# Patient Record
Sex: MALE | Race: WHITE | NOT HISPANIC OR LATINO | Employment: OTHER | URBAN - METROPOLITAN AREA
[De-identification: names, ages, dates, MRNs, and addresses within clinical notes are randomized per-mention and may not be internally consistent; named-entity substitution may affect disease eponyms.]

---

## 2017-01-04 ENCOUNTER — HOSPITAL ENCOUNTER (OUTPATIENT)
Facility: MEDICAL CENTER | Age: 72
End: 2017-01-04
Attending: SURGERY | Admitting: SURGERY
Payer: MEDICARE

## 2017-01-06 ENCOUNTER — APPOINTMENT (OUTPATIENT)
Dept: ADMISSIONS | Facility: MEDICAL CENTER | Age: 72
End: 2017-01-06
Attending: SURGERY
Payer: MEDICARE

## 2017-01-06 VITALS — HEIGHT: 69 IN

## 2017-01-06 RX ORDER — DILTIAZEM HYDROCHLORIDE 60 MG/1
60 TABLET, FILM COATED ORAL 2 TIMES DAILY
COMMUNITY
End: 2017-01-17 | Stop reason: HOSPADM

## 2017-01-06 RX ORDER — ATORVASTATIN CALCIUM 80 MG/1
80 TABLET, FILM COATED ORAL NIGHTLY
COMMUNITY
End: 2017-01-17 | Stop reason: HOSPADM

## 2017-01-06 RX ORDER — TIOTROPIUM BROMIDE 18 UG/1
18 CAPSULE ORAL; RESPIRATORY (INHALATION) DAILY
COMMUNITY
End: 2017-01-17 | Stop reason: HOSPADM

## 2017-01-06 RX ORDER — DIMENHYDRINATE 50 MG
TABLET ORAL DAILY
COMMUNITY
End: 2017-01-17 | Stop reason: HOSPADM

## 2017-01-06 NOTE — OR NURSING
Telephone preadmission done with patient who is coming in for surgery with Dr. Paulino on 1/18/17. Pt has history of Afib, and denies recent EKG. RN informed patient that Dr. Paulino's office would be contacted to arrange preop EKG for his upcoming surgery and to clarify Aspirin instructions.    RN contacted Dr. Jefferson Hensley's surgery scheduler. Shellie informed that due to patient's Afib and age, EKG would be needed for surgery per anesthesia guidelines and that patient would need Aspirin instructions. Shellie told this RN, she will contact patient today to arrange preop EKG and give patient Aspirin instructions.

## 2017-01-19 ENCOUNTER — TELEPHONE (OUTPATIENT)
Dept: CARDIOLOGY | Facility: MEDICAL CENTER | Age: 72
End: 2017-01-19

## 2017-01-19 NOTE — TELEPHONE ENCOUNTER
----- Message from Dot Arshad sent at 1/19/2017 12:09 PM PST -----  Regarding: Question about stopping medications  TT/Zenaida    Patient has an appt tomorrow, 1/20, with Dr Davila for surgical clearance. He needs to find out if he should stop taking his medications today and wants a call back at [phone].

## 2017-01-19 NOTE — TELEPHONE ENCOUNTER
Pt not available when called back.  Spoke with his spouse, Adelaida.  Advised Dr. Davila is seeing him for initial visit tomorrow, no medications available.  He will take his medications as he has been, bring them with him or a list and follow up with Dr. Davila as scheduled 1/20 @ 2:15pm

## 2017-01-20 ENCOUNTER — OFFICE VISIT (OUTPATIENT)
Dept: CARDIOLOGY | Facility: MEDICAL CENTER | Age: 72
End: 2017-01-20
Payer: MEDICARE

## 2017-01-20 VITALS
OXYGEN SATURATION: 94 % | SYSTOLIC BLOOD PRESSURE: 120 MMHG | HEART RATE: 96 BPM | HEIGHT: 69 IN | WEIGHT: 159.5 LBS | BODY MASS INDEX: 23.62 KG/M2 | DIASTOLIC BLOOD PRESSURE: 72 MMHG

## 2017-01-20 DIAGNOSIS — I71.40 ABDOMINAL AORTIC ANEURYSM (AAA) WITHOUT RUPTURE (HCC): ICD-10-CM

## 2017-01-20 DIAGNOSIS — E78.2 MIXED HYPERLIPIDEMIA: ICD-10-CM

## 2017-01-20 LAB — EKG IMPRESSION: NORMAL

## 2017-01-20 PROCEDURE — 99204 OFFICE O/P NEW MOD 45 MIN: CPT | Performed by: INTERNAL MEDICINE

## 2017-01-20 PROCEDURE — 93000 ELECTROCARDIOGRAM COMPLETE: CPT | Performed by: INTERNAL MEDICINE

## 2017-01-20 RX ORDER — DILTIAZEM HYDROCHLORIDE 60 MG/1
1 TABLET, FILM COATED ORAL 2 TIMES DAILY
Refills: 3 | Status: ON HOLD | COMMUNITY
Start: 2016-11-22 | End: 2017-09-05

## 2017-01-20 RX ORDER — TIOTROPIUM BROMIDE 18 UG/1
1 CAPSULE ORAL; RESPIRATORY (INHALATION) DAILY
Refills: 3 | COMMUNITY
Start: 2017-01-05 | End: 2018-07-06

## 2017-01-20 RX ORDER — ATORVASTATIN CALCIUM 80 MG/1
80 TABLET, FILM COATED ORAL DAILY
COMMUNITY
Start: 2016-12-15 | End: 2017-09-13 | Stop reason: SDUPTHER

## 2017-01-20 RX ORDER — UBIDECARENONE 100 MG
100 CAPSULE ORAL DAILY
Status: ON HOLD | COMMUNITY
Start: 2014-03-12 | End: 2017-09-05

## 2017-01-20 ASSESSMENT — ENCOUNTER SYMPTOMS
CLAUDICATION: 0
LOSS OF CONSCIOUSNESS: 0
DIZZINESS: 0
HEADACHES: 0
BLOOD IN STOOL: 0
EYE PAIN: 0
DEPRESSION: 0
HALLUCINATIONS: 0
CHILLS: 0
SPEECH CHANGE: 0
SHORTNESS OF BREATH: 0
PND: 0
SENSORY CHANGE: 0
MYALGIAS: 0
VOMITING: 0
ORTHOPNEA: 0
PALPITATIONS: 0
COUGH: 0
BRUISES/BLEEDS EASILY: 0
NAUSEA: 0
EYE DISCHARGE: 0
BLURRED VISION: 0
WEIGHT LOSS: 0
DOUBLE VISION: 0
ABDOMINAL PAIN: 0
FALLS: 0
FEVER: 0

## 2017-01-20 NOTE — MR AVS SNAPSHOT
"        Khai Munoz   2017 2:15 PM   Office Visit   MRN: [id number]    Department:  [facility]   Dept Phone:  [phone]    Description:  Male : 1945   Provider:  James Davila M.D.           Reason for Visit     New Patient Consult on potenital surgery for abdominal aortic anuerysm as per Dr. Paulino. Abnormal EKG, AFIB.       Allergies as of 2017     Allergen Noted Reactions    Declomycin 2017       Reaction a long time ago      You were diagnosed with     Abdominal aortic aneurysm (AAA) without rupture (CMS-[facility])   [3936782]       Mixed hyperlipidemia   [272.2.ICD-9-CM]         Vital Signs     Blood Pressure Pulse Height Weight Body Mass Index Oxygen Saturation    120/72 mmHg 96 1.753 m (5' 9.02\") 72.349 kg (159 lb 8 oz) 23.54 kg/m2 94%    Smoking Status                   Current Every Day Smoker           Basic Information     Date Of Birth Sex Race Ethnicity Preferred Language    1945 Male White Non- English      Health Maintenance        Date Due Completion Dates    IMM DTaP/Tdap/Td Vaccine (1 - Tdap) 1963 ---    COLONOSCOPY 1994 ---    IMM ZOSTER VACCINE 2004 ---    IMM PNEUMOCOCCAL 65+ (ADULT) LOW/MEDIUM RISK SERIES (1 of 2 - PCV13) 2009 ---    IMM INFLUENZA (1) 2016 ---            Results       Current Immunizations     No immunizations on file.      Below and/or attached are the medications your provider expects you to take. Review all of your home medications and newly ordered medications with your provider and/or pharmacist. Follow medication instructions as directed by your provider and/or pharmacist. Please keep your medication list with you and share with your provider. Update the information when medications are discontinued, doses are changed, or new medications (including over-the-counter products) are added; and carry medication information at all times in the event of emergency situations     Allergies:  DECLOMYCIN - " (reactions not documented)               Medications  Valid as of: January 20, 2017 -  2:29 PM    Generic Name Brand Name Tablet Size Instructions for use    Aspirin (Tab) aspirin 81 MG Take 81 mg by mouth every day.        Atorvastatin Calcium (Tab) LIPITOR 80 MG Take 80 mg by mouth every bedtime.        Calcium Citrate   Take 1 Tab by mouth 2 Times a Day. (With Vitamin D)        Coenzyme Q10 (Cap) Coenzyme Q10 100 MG Take 100 mg by mouth every day.        Cranberry   Take 2 Tabs by mouth every day.        DiltiaZEM HCl (Tab) CARDIZEM 60 MG Take 1 Tab by mouth 2 Times a Day.        Fluticasone-Salmeterol (AEROSOL POWDER, BREATH ACTIVATED) ADVAIR DISKUS 250-50 MCG/DOSE Take 1 Puff by mouth 2 Times a Day.        Pyridoxine HCl (Tab) pyridoxine 100 MG Take 100 mg by mouth every day.        Tiotropium Bromide Monohydrate (Cap) SPIRIVA HANDIHALER 18 MCG Inhale 1 Cap by mouth every day.        .                 Medicines prescribed today were sent to:     [id number] - [place], [place] - [address]    [address] [facility] [address]    Phone: [phone] Fax: [phone]    Open 24 Hours?: No      Medication refill instructions:       If your prescription bottle indicates you have medication refills left, it is not necessary to call your provider’s office. Please contact your pharmacy and they will refill your medication.    If your prescription bottle indicates you do not have any refills left, you may request refills at any time through one of the following ways: The online [employer] system (except Urgent Care), by calling your provider’s office, or by asking your pharmacy to contact your provider’s office with a refill request. Medication refills are processed only during regular business hours and may not be available until the next business day. Your provider may request additional information or to have a follow-up visit with you prior to refilling your medication.   *Please Note: Medication  refills are assigned a new Rx number when refilled electronically. Your pharmacy may indicate that no refills were authorized even though a new prescription for the same medication is available at the pharmacy. Please request the medicine by name with the pharmacy before contacting your provider for a refill.        Your To Do List     Future Labs/Procedures Complete By Expires    ECHOCARDIOGRAM COMP W/O CONT  As directed 1/21/2018         MyChart Status: Patient Declined

## 2017-01-20 NOTE — Clinical Note
[facility]   [address]  [place] [address]  Phone: [phone]  Fax: [phone]              Khai Munoz  1945    Encounter Date: 1/20/2017    James Davila M.D.          PROGRESS NOTE:  Subjective:   Khai Munoz is a 71 y.o. male who presents today for cardiac evaluation before his AAA surgery. Sounds to me that the approach is going to be endovascular. However there is a chance that he might need open repair. In terms of his cardiac history, he was told that he might have had atrial fibrillation in the past. His EKG today shows evidence of sinus rhythm. No prior cardiac workup or surgery in terms of invasive nature. He denies having any chest pain or shortness of breath. Able to walk up or climb up one flight of stairs without problems.    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HCC)    • Cancer (CMS-HCC) 2006     prostate   • High cholesterol    • Dental disorder      dental implants   • Emphysema of lung (CMS-HCC)    • Asthma      as a child   • Chronic cough    • Breath shortness    • Psychiatric problem      anxiety and depression     Past Surgical History   Procedure Laterality Date   • Other       brachy therapy and radiation for prostate ca   • Other       dental implants   • Cyst excision       scrotal     Family History   Problem Relation Age of Onset   • Heart Disease Mother      History   Smoking status   • Current Every Day Smoker -- 0.50 packs/day for 50 years   • Types: Cigarettes   Smokeless tobacco   • Never Used     Allergies   Allergen Reactions   • Declomycin      Reaction a long time ago     Outpatient Encounter Prescriptions as of 1/20/2017   Medication Sig Dispense Refill   • SPIRIVA HANDIHALER 18 MCG Cap Inhale 1 Cap by mouth every day.  3   • ADVAIR DISKUS 250-50 MCG/DOSE AEROSOL POWDER, BREATH  "ACTIVATED Take 1 Puff by mouth 2 Times a Day.  0   • atorvastatin (LIPITOR) 80 MG tablet Take 80 mg by mouth every bedtime.     • diltiazem (CARDIZEM) 60 MG Tab Take 1 Tab by mouth 2 Times a Day.  3   • Coenzyme Q10 (SM COENZYME Q-10) 100 MG Cap Take 100 mg by mouth every day.     • pyridoxine 100 MG tablet Take 100 mg by mouth every day.     • aspirin 81 MG tablet Take 81 mg by mouth every day.     • CRANBERRY CONCENTRATE PO Take 2 Tabs by mouth every day.     • CALCIUM CITRATE PO Take 1 Tab by mouth 2 Times a Day. (With Vitamin D)       No facility-administered encounter medications on file as of 1/20/2017.     Review of Systems   Constitutional: Negative for fever, chills, weight loss and malaise/fatigue.   HENT: Negative for ear discharge, ear pain, hearing loss and nosebleeds.    Eyes: Negative for blurred vision, double vision, pain and discharge.   Respiratory: Negative for cough and shortness of breath.    Cardiovascular: Negative for chest pain, palpitations, orthopnea, claudication, leg swelling and PND.   Gastrointestinal: Negative for nausea, vomiting, abdominal pain, blood in stool and melena.   Genitourinary: Negative for dysuria and hematuria.   Musculoskeletal: Negative for myalgias, joint pain and falls.   Skin: Negative for itching and rash.   Neurological: Negative for dizziness, sensory change, speech change, loss of consciousness and headaches.   Endo/Heme/Allergies: Negative for environmental allergies. Does not bruise/bleed easily.   Psychiatric/Behavioral: Negative for depression, suicidal ideas and hallucinations.        Objective:   /72 mmHg  Pulse 96  Ht 1.753 m (5' 9.02\")  Wt 72.349 kg (159 lb 8 oz)  BMI 23.54 kg/m2  SpO2 94%    Physical Exam   Constitutional: He is oriented to person, place, and time. No distress.   HENT:   Head: Normocephalic and atraumatic.   Eyes: EOM are normal.   Neck: Normal range of motion. No JVD present.   Cardiovascular: Normal rate, regular rhythm, " normal heart sounds and intact distal pulses.  Exam reveals no gallop and no friction rub.    No murmur heard.  Bilateral femoral pulses are 2+, bilateral dorsalis pedis pulses are 2+, bilateral posterior tibialis pulses are 2+.   Pulmonary/Chest: No respiratory distress. He has no wheezes. He has no rales. He exhibits no tenderness.   Abdominal: Soft. Bowel sounds are normal. There is no tenderness. There is no rebound and no guarding.   The is no presence of abdominal bruits   Musculoskeletal: Normal range of motion.   Neurological: He is alert and oriented to person, place, and time.   Skin: Skin is warm and dry.   Psychiatric: He has a normal mood and affect.   Nursing note and vitals reviewed.      Assessment:     1. Abdominal aortic aneurysm (AAA) without rupture (CMS-HCC)  ECHOCARDIOGRAM COMP W/O CONT    PET SCAN MYOCARDIAL PERFUSION    [facility] EKG (Clinic Performed)   2. Mixed hyperlipidemia  ECHOCARDIOGRAM COMP W/O CONT    PET SCAN MYOCARDIAL PERFUSION    [facility] EKG (Clinic Performed)       Medical Decision Making:  Today's Assessment / Status / Plan:     In anticipating for possible open repair of his AAA, I think we should pursue with a transthoracic echocardiogram along with myocardial PET scan stress test for risk stratification.    In terms of his history of atrial fibrillation, I do not see that it is a problem at this time. He is in sinus rhythm. And even if he is in atrial fibrillation during his surgery I don't think that would be a problem either. We can always treat it when it comes.    I will see patient back in clinic with lab tests and studies results in 3 months.    I thank you Dr. Fajardo for referring patient to our Cardiology Clinic today.        Baldo Fajardo M.D.  [address] [place] [address]  VIA Facsimile: [phone]

## 2017-01-20 NOTE — PROGRESS NOTES
Subjective:   Khai Munoz is a 71 y.o. male who presents today for cardiac evaluation before his AAA surgery. Sounds to me that the approach is going to be endovascular. However there is a chance that he might need open repair. In terms of his cardiac history, he was told that he might have had atrial fibrillation in the past. His EKG today shows evidence of sinus rhythm. No prior cardiac workup or surgery in terms of invasive nature. He denies having any chest pain or shortness of breath. Able to walk up or climb up one flight of stairs without problems.    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HCC)    • Cancer (CMS-HCC) 2006     prostate   • High cholesterol    • Dental disorder      dental implants   • Emphysema of lung (CMS-HCC)    • Asthma      as a child   • Chronic cough    • Breath shortness    • Psychiatric problem      anxiety and depression     Past Surgical History   Procedure Laterality Date   • Other       brachy therapy and radiation for prostate ca   • Other       dental implants   • Cyst excision       scrotal     Family History   Problem Relation Age of Onset   • Heart Disease Mother      History   Smoking status   • Current Every Day Smoker -- 0.50 packs/day for 50 years   • Types: Cigarettes   Smokeless tobacco   • Never Used     Allergies   Allergen Reactions   • Declomycin      Reaction a long time ago     Outpatient Encounter Prescriptions as of 1/20/2017   Medication Sig Dispense Refill   • SPIRIVA HANDIHALER 18 MCG Cap Inhale 1 Cap by mouth every day.  3   • ADVAIR DISKUS 250-50 MCG/DOSE AEROSOL POWDER, BREATH ACTIVATED Take 1 Puff by mouth 2 Times a Day.  0   • atorvastatin (LIPITOR) 80 MG tablet Take 80 mg by mouth every bedtime.     • diltiazem (CARDIZEM) 60 MG Tab Take 1 Tab by mouth 2 Times a Day.  3   • Coenzyme Q10 (SM COENZYME Q-10) 100 MG Cap Take 100 mg by mouth every day.   "   • pyridoxine 100 MG tablet Take 100 mg by mouth every day.     • aspirin 81 MG tablet Take 81 mg by mouth every day.     • CRANBERRY CONCENTRATE PO Take 2 Tabs by mouth every day.     • CALCIUM CITRATE PO Take 1 Tab by mouth 2 Times a Day. (With Vitamin D)       No facility-administered encounter medications on file as of 1/20/2017.     Review of Systems   Constitutional: Negative for fever, chills, weight loss and malaise/fatigue.   HENT: Negative for ear discharge, ear pain, hearing loss and nosebleeds.    Eyes: Negative for blurred vision, double vision, pain and discharge.   Respiratory: Negative for cough and shortness of breath.    Cardiovascular: Negative for chest pain, palpitations, orthopnea, claudication, leg swelling and PND.   Gastrointestinal: Negative for nausea, vomiting, abdominal pain, blood in stool and melena.   Genitourinary: Negative for dysuria and hematuria.   Musculoskeletal: Negative for myalgias, joint pain and falls.   Skin: Negative for itching and rash.   Neurological: Negative for dizziness, sensory change, speech change, loss of consciousness and headaches.   Endo/Heme/Allergies: Negative for environmental allergies. Does not bruise/bleed easily.   Psychiatric/Behavioral: Negative for depression, suicidal ideas and hallucinations.        Objective:   /72 mmHg  Pulse 96  Ht 1.753 m (5' 9.02\")  Wt 72.349 kg (159 lb 8 oz)  BMI 23.54 kg/m2  SpO2 94%    Physical Exam   Constitutional: He is oriented to person, place, and time. No distress.   HENT:   Head: Normocephalic and atraumatic.   Eyes: EOM are normal.   Neck: Normal range of motion. No JVD present.   Cardiovascular: Normal rate, regular rhythm, normal heart sounds and intact distal pulses.  Exam reveals no gallop and no friction rub.    No murmur heard.  Bilateral femoral pulses are 2+, bilateral dorsalis pedis pulses are 2+, bilateral posterior tibialis pulses are 2+.   Pulmonary/Chest: No respiratory distress. He has " no wheezes. He has no rales. He exhibits no tenderness.   Abdominal: Soft. Bowel sounds are normal. There is no tenderness. There is no rebound and no guarding.   The is no presence of abdominal bruits   Musculoskeletal: Normal range of motion.   Neurological: He is alert and oriented to person, place, and time.   Skin: Skin is warm and dry.   Psychiatric: He has a normal mood and affect.   Nursing note and vitals reviewed.      Assessment:     1. Abdominal aortic aneurysm (AAA) without rupture (CMS-HCC)  ECHOCARDIOGRAM COMP W/O CONT    PET SCAN MYOCARDIAL PERFUSION    [facility] EKG (Clinic Performed)   2. Mixed hyperlipidemia  ECHOCARDIOGRAM COMP W/O CONT    PET SCAN MYOCARDIAL PERFUSION    [facility] (Clinic Performed)       Medical Decision Making:  Today's Assessment / Status / Plan:     In anticipating for possible open repair of his AAA, I think we should pursue with a transthoracic echocardiogram along with myocardial PET scan stress test for risk stratification.    In terms of his history of atrial fibrillation, I do not see that it is a problem at this time. He is in sinus rhythm. And even if he is in atrial fibrillation during his surgery I don't think that would be a problem either. We can always treat it when it comes.    I will see patient back in clinic with lab tests and studies results in 3 months.    I thank you Dr. Fajardo for referring patient to our Cardiology Clinic today.

## 2017-01-23 ENCOUNTER — HOSPITAL ENCOUNTER (OUTPATIENT)
Dept: CARDIOLOGY | Facility: MEDICAL CENTER | Age: 72
End: 2017-01-23
Attending: INTERNAL MEDICINE | Admitting: INTERNAL MEDICINE
Payer: MEDICARE

## 2017-01-23 ENCOUNTER — HOSPITAL ENCOUNTER (OUTPATIENT)
Dept: CARDIOLOGY | Facility: MEDICAL CENTER | Age: 72
End: 2017-01-23
Attending: INTERNAL MEDICINE
Payer: MEDICARE

## 2017-01-23 DIAGNOSIS — I71.40 ABDOMINAL AORTIC ANEURYSM (AAA) WITHOUT RUPTURE (HCC): ICD-10-CM

## 2017-01-23 DIAGNOSIS — E78.2 MIXED HYPERLIPIDEMIA: ICD-10-CM

## 2017-01-23 PROCEDURE — 93017 CV STRESS TEST TRACING ONLY: CPT

## 2017-01-23 PROCEDURE — 78492 MYOCRD IMG PET MLT RST&STRS: CPT

## 2017-01-23 PROCEDURE — 700111 HCHG RX REV CODE 636 W/ 250 OVERRIDE (IP)

## 2017-01-23 PROCEDURE — 93306 TTE W/DOPPLER COMPLETE: CPT | Mod: 26 | Performed by: INTERNAL MEDICINE

## 2017-01-23 PROCEDURE — A9555 RB82 RUBIDIUM: HCPCS

## 2017-01-24 ENCOUNTER — TELEPHONE (OUTPATIENT)
Dept: CARDIOLOGY | Facility: MEDICAL CENTER | Age: 72
End: 2017-01-24

## 2017-01-24 LAB
LV EJECT FRACT  99904: 60
LV EJECT FRACT MOD 2C 99903: 68.46
LV EJECT FRACT MOD 4C 99902: 68.26
LV EJECT FRACT MOD BP 99901: 69.15

## 2017-01-24 NOTE — PROCEDURES
REFERRING PHYSICIAN:  Antonio Davila MD    AGE:  71   GENDER:  Male   HEIGHT:  69 inches   WEIGHT:  159 pounds   BMI:     INDICATIONS:  Shortness of breath.    MEDICATIONS:      PROCEDURE:  The patient reviewed and signed the acknowledgement for testing   form.  The patient was in a fasting state and was properly prepared for   testing.  An intravenous line was inserted and a flush of normal saline   followed to insure line patency.    A transmission scan was acquired for attenuation correction using the internal   Germanium sources.  The patient was then administered 20.0 mCi of   Rubidium-82.  Approximately 90 seconds after the infusion, resting imagine   were obtained with ECG-gating.  Following the resting series, the patient   administered 41 mg of dipyridamole over four minutes.  The blood pressure,   heart rate and ECG were monitored and recorded.  After the dipyridamole   infusion was completed, another transmission scan for attenuation correction   was obtained.  The patient was then administered 20.0 mCi of Rubidium-82.    Approximately 90 seconds after the infusion, Peak stress images were obtained   with ECG-gating.    CLINICAL RESPONSE:  Resting blood pressure was 121/67 with a heart rate of   96.  Immediately post-dipyridamole infusion the blood pressure was 110/53 with   a heart rate of 89.  After a recovery period the blood pressure was 115/71   with a heart rate of 88.    The patient experienced no symptoms during testing.    Aminophylline was not administered following the scan.    ELECTROCARDIOGRAPHIC FINDINGS:  The patient has a normal baseline   electrocardiogram.  There are no significant changes or arrhythmias   appreciated during pharmacologic stress.    SCINTOGRAPHIC FINDINGS:  There is normal perfusion of the left ventricle, both   globally and segmentally after pharmacologic stress.  No dilation of left   ventricle was appreciated.    GATED WALL MOTION FINDINGS:  The left ventricle appears  normal segmentally and   globally.  Calculated resting ejection fraction of 58% and corrects to 69%   with stress.    CONCLUSIONS AND IMPRESSIONS:  1.  Normal cardiac PET scan without evidence for jeopardized viable myocardium   or prior infarction.  2.  Resting ejection fraction 58%.       ____________________________________     MD RICKY MICHAELS / SUKHJINDER    DD:  01/23/2017 17:03:04  DT:  01/23/2017 17:26:36    D#:  [id number]  Job#:  [id number]

## 2017-01-24 NOTE — TELEPHONE ENCOUNTER
Pt notified his PET from 1/20/17 is WNL and he is ok to proceed with planned surgery with Dr. Paulino with [facility] for AAA repair.  Clearance letter faxed to [phone]  Tel: [phone]  Moderate cardiac risk.  PET SCAN and office notes from 1/20/17 also faxed

## 2017-01-24 NOTE — TELEPHONE ENCOUNTER
----- Message from James Davila M.D. sent at 1/24/2017  8:18 AM PST -----  Dear ed    He should be able to proceed with his surgery.

## 2017-02-09 ENCOUNTER — APPOINTMENT (OUTPATIENT)
Dept: ADMISSIONS | Facility: MEDICAL CENTER | Age: 72
DRG: 269 | End: 2017-02-09
Attending: SURGERY
Payer: MEDICARE

## 2017-02-15 ENCOUNTER — HOSPITAL ENCOUNTER (INPATIENT)
Facility: MEDICAL CENTER | Age: 72
LOS: 4 days | DRG: 269 | End: 2017-02-19
Attending: SURGERY | Admitting: SURGERY
Payer: MEDICARE

## 2017-02-15 ENCOUNTER — APPOINTMENT (OUTPATIENT)
Dept: RADIOLOGY | Facility: MEDICAL CENTER | Age: 72
DRG: 269 | End: 2017-02-15
Attending: SURGERY
Payer: MEDICARE

## 2017-02-15 PROBLEM — I71.40 ABDOMINAL ANEURYSM (HCC): Status: ACTIVE | Noted: 2017-02-15

## 2017-02-15 LAB
ANION GAP SERPL CALC-SCNC: 11 MMOL/L (ref 0–11.9)
BASOPHILS # BLD AUTO: 0.5 % (ref 0–1.8)
BASOPHILS # BLD: 0.04 K/UL (ref 0–0.12)
BUN SERPL-MCNC: 12 MG/DL (ref 8–22)
CALCIUM SERPL-MCNC: 9.3 MG/DL (ref 8.5–10.5)
CHLORIDE SERPL-SCNC: 107 MMOL/L (ref 96–112)
CO2 SERPL-SCNC: 19 MMOL/L (ref 20–33)
CREAT SERPL-MCNC: 1.21 MG/DL (ref 0.5–1.4)
EOSINOPHIL # BLD AUTO: 0.54 K/UL (ref 0–0.51)
EOSINOPHIL NFR BLD: 6.2 % (ref 0–6.9)
ERYTHROCYTE [DISTWIDTH] IN BLOOD BY AUTOMATED COUNT: 49.9 FL (ref 35.9–50)
GFR SERPL CREATININE-BSD FRML MDRD: 59 ML/MIN/1.73 M 2
GLUCOSE SERPL-MCNC: 103 MG/DL (ref 65–99)
HCT VFR BLD AUTO: 41.7 % (ref 42–52)
HGB BLD-MCNC: 14.3 G/DL (ref 14–18)
IMM GRANULOCYTES # BLD AUTO: 0.03 K/UL (ref 0–0.11)
IMM GRANULOCYTES NFR BLD AUTO: 0.3 % (ref 0–0.9)
LYMPHOCYTES # BLD AUTO: 1.57 K/UL (ref 1–4.8)
LYMPHOCYTES NFR BLD: 17.9 % (ref 22–41)
MCH RBC QN AUTO: 33.4 PG (ref 27–33)
MCHC RBC AUTO-ENTMCNC: 34.3 G/DL (ref 33.7–35.3)
MCV RBC AUTO: 97.4 FL (ref 81.4–97.8)
MONOCYTES # BLD AUTO: 0.62 K/UL (ref 0–0.85)
MONOCYTES NFR BLD AUTO: 7.1 % (ref 0–13.4)
NEUTROPHILS # BLD AUTO: 5.95 K/UL (ref 1.82–7.42)
NEUTROPHILS NFR BLD: 68 % (ref 44–72)
NRBC # BLD AUTO: 0 K/UL
NRBC BLD AUTO-RTO: 0 /100 WBC
PLATELET # BLD AUTO: 204 K/UL (ref 164–446)
PMV BLD AUTO: 10 FL (ref 9–12.9)
POTASSIUM SERPL-SCNC: 4.1 MMOL/L (ref 3.6–5.5)
RBC # BLD AUTO: 4.28 M/UL (ref 4.7–6.1)
SODIUM SERPL-SCNC: 137 MMOL/L (ref 135–145)
WBC # BLD AUTO: 8.8 K/UL (ref 4.8–10.8)

## 2017-02-15 PROCEDURE — 500258 HCHG CATH, SIZING OMNI 5F 70CM: Performed by: SURGERY

## 2017-02-15 PROCEDURE — 500700 HCHG HEMOCLIP, SMALL (RED): Performed by: SURGERY

## 2017-02-15 PROCEDURE — 501837 HCHG SUTURE CV: Performed by: SURGERY

## 2017-02-15 PROCEDURE — 160041 HCHG SURGERY MINUTES - EA ADDL 1 MIN LEVEL 4: Performed by: SURGERY

## 2017-02-15 PROCEDURE — 80048 BASIC METABOLIC PNL TOTAL CA: CPT

## 2017-02-15 PROCEDURE — A4606 OXYGEN PROBE USED W OXIMETER: HCPCS | Performed by: SURGERY

## 2017-02-15 PROCEDURE — 500887 HCHG PACK, MAJOR BASIN: Performed by: SURGERY

## 2017-02-15 PROCEDURE — 700101 HCHG RX REV CODE 250

## 2017-02-15 PROCEDURE — 500043 HCHG BAG-A-JET: Performed by: SURGERY

## 2017-02-15 PROCEDURE — 110371 HCHG SHELL REV 272: Performed by: SURGERY

## 2017-02-15 PROCEDURE — C1769 GUIDE WIRE: HCPCS | Performed by: SURGERY

## 2017-02-15 PROCEDURE — 85025 COMPLETE CBC W/AUTO DIFF WBC: CPT

## 2017-02-15 PROCEDURE — 502000 HCHG MISC OR IMPLANTS RC 0278: Performed by: SURGERY

## 2017-02-15 PROCEDURE — 500698 HCHG HEMOCLIP, MEDIUM: Performed by: SURGERY

## 2017-02-15 PROCEDURE — 770006 HCHG ROOM/CARE - MED/SURG/GYN SEMI*

## 2017-02-15 PROCEDURE — A9270 NON-COVERED ITEM OR SERVICE: HCPCS

## 2017-02-15 PROCEDURE — 160009 HCHG ANES TIME/MIN: Performed by: SURGERY

## 2017-02-15 PROCEDURE — 500869 HCHG NEEDLE, THINWALL 19GA (COOK): Performed by: SURGERY

## 2017-02-15 PROCEDURE — 700102 HCHG RX REV CODE 250 W/ 637 OVERRIDE(OP): Performed by: NURSE PRACTITIONER

## 2017-02-15 PROCEDURE — 501499 HCHG SURG-I-LOOP, MINI (BLUE): Performed by: SURGERY

## 2017-02-15 PROCEDURE — 500896 HCHG PACK, VASCULAR (FOR ROOM 10): Performed by: SURGERY

## 2017-02-15 PROCEDURE — 501445 HCHG STAPLER, SKIN DISP: Performed by: SURGERY

## 2017-02-15 PROCEDURE — A9270 NON-COVERED ITEM OR SERVICE: HCPCS | Performed by: NURSE PRACTITIONER

## 2017-02-15 PROCEDURE — 700111 HCHG RX REV CODE 636 W/ 250 OVERRIDE (IP)

## 2017-02-15 PROCEDURE — 160048 HCHG OR STATISTICAL LEVEL 1-5: Performed by: SURGERY

## 2017-02-15 PROCEDURE — C1768 GRAFT, VASCULAR: HCPCS | Performed by: SURGERY

## 2017-02-15 PROCEDURE — 501498 HCHG SURG-I-LOOP, MAXI (BLUE): Performed by: SURGERY

## 2017-02-15 PROCEDURE — C1894 INTRO/SHEATH, NON-LASER: HCPCS | Performed by: SURGERY

## 2017-02-15 PROCEDURE — B4101ZZ FLUOROSCOPY OF ABDOMINAL AORTA USING LOW OSMOLAR CONTRAST: ICD-10-PCS | Performed by: SURGERY

## 2017-02-15 PROCEDURE — 160002 HCHG RECOVERY MINUTES (STAT): Performed by: SURGERY

## 2017-02-15 PROCEDURE — 700102 HCHG RX REV CODE 250 W/ 637 OVERRIDE(OP)

## 2017-02-15 PROCEDURE — A6402 STERILE GAUZE <= 16 SQ IN: HCPCS | Performed by: SURGERY

## 2017-02-15 PROCEDURE — 110382 HCHG SHELL REV 271: Performed by: SURGERY

## 2017-02-15 PROCEDURE — 502240 HCHG MISC OR SUPPLY RC 0272: Performed by: SURGERY

## 2017-02-15 PROCEDURE — 160036 HCHG PACU - EA ADDL 30 MINS PHASE I: Performed by: SURGERY

## 2017-02-15 PROCEDURE — 04V03DZ RESTRICTION OF ABDOMINAL AORTA WITH INTRALUMINAL DEVICE, PERCUTANEOUS APPROACH: ICD-10-PCS | Performed by: SURGERY

## 2017-02-15 PROCEDURE — 501838 HCHG SUTURE GENERAL: Performed by: SURGERY

## 2017-02-15 PROCEDURE — 160035 HCHG PACU - 1ST 60 MINS PHASE I: Performed by: SURGERY

## 2017-02-15 PROCEDURE — 160029 HCHG SURGERY MINUTES - 1ST 30 MINS LEVEL 4: Performed by: SURGERY

## 2017-02-15 DEVICE — IMPLANTABLE DEVICE: Type: IMPLANTABLE DEVICE | Status: FUNCTIONAL

## 2017-02-15 DEVICE — ENDOPROSTHESIS CONTRALATERAL LEG 14.5MM X 14CM: Type: IMPLANTABLE DEVICE | Status: FUNCTIONAL

## 2017-02-15 RX ORDER — ACETAMINOPHEN 500 MG
1000 TABLET ORAL EVERY 6 HOURS
Status: DISCONTINUED | OUTPATIENT
Start: 2017-02-15 | End: 2017-02-19 | Stop reason: HOSPADM

## 2017-02-15 RX ORDER — PYRIDOXINE HCL (VITAMIN B6) 50 MG
100 TABLET ORAL DAILY
Status: DISCONTINUED | OUTPATIENT
Start: 2017-02-16 | End: 2017-02-19 | Stop reason: HOSPADM

## 2017-02-15 RX ORDER — DEXTROSE MONOHYDRATE, SODIUM CHLORIDE, AND POTASSIUM CHLORIDE 50; 1.49; 4.5 G/1000ML; G/1000ML; G/1000ML
INJECTION, SOLUTION INTRAVENOUS CONTINUOUS
Status: DISCONTINUED | OUTPATIENT
Start: 2017-02-15 | End: 2017-02-19 | Stop reason: HOSPADM

## 2017-02-15 RX ORDER — MAGNESIUM HYDROXIDE 1200 MG/15ML
LIQUID ORAL
Status: DISCONTINUED | OUTPATIENT
Start: 2017-02-15 | End: 2017-02-15 | Stop reason: HOSPADM

## 2017-02-15 RX ORDER — DILTIAZEM HYDROCHLORIDE 60 MG/1
60 TABLET, FILM COATED ORAL 2 TIMES DAILY
Status: DISCONTINUED | OUTPATIENT
Start: 2017-02-15 | End: 2017-02-19 | Stop reason: HOSPADM

## 2017-02-15 RX ORDER — MORPHINE SULFATE 4 MG/ML
1-4 INJECTION, SOLUTION INTRAMUSCULAR; INTRAVENOUS
Status: DISCONTINUED | OUTPATIENT
Start: 2017-02-15 | End: 2017-02-19 | Stop reason: HOSPADM

## 2017-02-15 RX ORDER — OXYCODONE HCL 5 MG/5 ML
SOLUTION, ORAL ORAL
Status: COMPLETED
Start: 2017-02-15 | End: 2017-02-15

## 2017-02-15 RX ORDER — MIDAZOLAM HYDROCHLORIDE 1 MG/ML
INJECTION INTRAMUSCULAR; INTRAVENOUS
Status: COMPLETED
Start: 2017-02-15 | End: 2017-02-15

## 2017-02-15 RX ORDER — ONDANSETRON 2 MG/ML
4 INJECTION INTRAMUSCULAR; INTRAVENOUS EVERY 4 HOURS PRN
Status: DISCONTINUED | OUTPATIENT
Start: 2017-02-15 | End: 2017-02-19 | Stop reason: HOSPADM

## 2017-02-15 RX ORDER — BUDESONIDE AND FORMOTEROL FUMARATE DIHYDRATE 160; 4.5 UG/1; UG/1
2 AEROSOL RESPIRATORY (INHALATION)
Status: DISCONTINUED | OUTPATIENT
Start: 2017-02-15 | End: 2017-02-16

## 2017-02-15 RX ORDER — ATORVASTATIN CALCIUM 40 MG/1
80 TABLET, FILM COATED ORAL DAILY
Status: DISCONTINUED | OUTPATIENT
Start: 2017-02-16 | End: 2017-02-19 | Stop reason: HOSPADM

## 2017-02-15 RX ORDER — SODIUM CHLORIDE, SODIUM LACTATE, POTASSIUM CHLORIDE, CALCIUM CHLORIDE 600; 310; 30; 20 MG/100ML; MG/100ML; MG/100ML; MG/100ML
1000 INJECTION, SOLUTION INTRAVENOUS
Status: COMPLETED | OUTPATIENT
Start: 2017-02-15 | End: 2017-02-15

## 2017-02-15 RX ORDER — TIOTROPIUM BROMIDE 18 UG/1
1 CAPSULE ORAL; RESPIRATORY (INHALATION) DAILY
Status: DISCONTINUED | OUTPATIENT
Start: 2017-02-16 | End: 2017-02-19 | Stop reason: HOSPADM

## 2017-02-15 RX ORDER — DEXTROSE MONOHYDRATE, SODIUM CHLORIDE, AND POTASSIUM CHLORIDE 50; 1.49; 4.5 G/1000ML; G/1000ML; G/1000ML
INJECTION, SOLUTION INTRAVENOUS
Status: COMPLETED
Start: 2017-02-15 | End: 2017-02-15

## 2017-02-15 RX ORDER — OXYCODONE HYDROCHLORIDE 5 MG/1
5 TABLET ORAL
Status: DISCONTINUED | OUTPATIENT
Start: 2017-02-15 | End: 2017-02-19 | Stop reason: HOSPADM

## 2017-02-15 RX ORDER — HYDROCODONE BITARTRATE AND ACETAMINOPHEN 5; 325 MG/1; MG/1
1-2 TABLET ORAL EVERY 4 HOURS PRN
Qty: 30 TAB | Refills: 0 | Status: SHIPPED | OUTPATIENT
Start: 2017-02-15 | End: 2017-08-31

## 2017-02-15 RX ADMIN — POTASSIUM CHLORIDE, DEXTROSE MONOHYDRATE AND SODIUM CHLORIDE: 150; 5; 450 INJECTION, SOLUTION INTRAVENOUS at 17:45

## 2017-02-15 RX ADMIN — SODIUM CHLORIDE, SODIUM LACTATE, POTASSIUM CHLORIDE, CALCIUM CHLORIDE 1000 ML: 600; 310; 30; 20 INJECTION, SOLUTION INTRAVENOUS at 12:36

## 2017-02-15 RX ADMIN — MIDAZOLAM 0.5 MG: 1 INJECTION INTRAMUSCULAR; INTRAVENOUS at 17:44

## 2017-02-15 RX ADMIN — FENTANYL CITRATE 25 MCG: 50 INJECTION, SOLUTION INTRAMUSCULAR; INTRAVENOUS at 17:22

## 2017-02-15 RX ADMIN — OXYCODONE HYDROCHLORIDE 10 MG: 5 SOLUTION ORAL at 17:15

## 2017-02-15 RX ADMIN — FENTANYL CITRATE 25 MCG: 50 INJECTION, SOLUTION INTRAMUSCULAR; INTRAVENOUS at 17:13

## 2017-02-15 ASSESSMENT — LIFESTYLE VARIABLES
ALCOHOL_USE: YES
EVER FELT BAD OR GUILTY ABOUT YOUR DRINKING: NO
TOTAL SCORE: 1
HAVE YOU EVER FELT YOU SHOULD CUT DOWN ON YOUR DRINKING: YES
TOTAL SCORE: 1
AVERAGE NUMBER OF DAYS PER WEEK YOU HAVE A DRINK CONTAINING ALCOHOL: 7
EVER_SMOKED: YES
HAVE PEOPLE ANNOYED YOU BY CRITICIZING YOUR DRINKING: NO
EVER HAD A DRINK FIRST THING IN THE MORNING TO STEADY YOUR NERVES TO GET RID OF A HANGOVER: NO
HOW MANY TIMES IN THE PAST YEAR HAVE YOU HAD 5 OR MORE DRINKS IN A DAY: 0
TOTAL SCORE: 1
CONSUMPTION TOTAL: NEGATIVE
ON A TYPICAL DAY WHEN YOU DRINK ALCOHOL HOW MANY DRINKS DO YOU HAVE: 1

## 2017-02-15 ASSESSMENT — PAIN SCALES - GENERAL
PAINLEVEL_OUTOF10: 3
PAINLEVEL_OUTOF10: 3
PAINLEVEL_OUTOF10: 0
PAINLEVEL_OUTOF10: 4
PAINLEVEL_OUTOF10: 3
PAINLEVEL_OUTOF10: 5
PAINLEVEL_OUTOF10: 3
PAINLEVEL_OUTOF10: 2
PAINLEVEL_OUTOF10: 2

## 2017-02-15 NOTE — IP AVS SNAPSHOT
2/19/2017          Khai Munoz  [address]  [address] [address]    Dear Khai:    [facility] wants to ensure your discharge home is safe and you or your loved ones have had all your questions answered regarding your care after you leave the hospital.    You may receive a telephone call within two days of your discharge.  This call is to make certain you understand your discharge instructions as well as ensure we provided you with the best care possible during your stay with us.     The call will only last approximately 3-5 minutes and will be done by a nurse.    Once again, we want to ensure your discharge home is safe and that you have a clear understanding of any next steps in your care.  If you have any questions or concerns, please do not hesitate to contact us, we are here for you.  Thank you for choosing [facility] for your healthcare needs.    Sincerely,    Aleksey Moyer    [facility]

## 2017-02-15 NOTE — IP AVS SNAPSHOT
" <p align=\"LEFT\"><IMG SRC=\"//EMRWB/blob$/Images/[email or web address]\" alt=\"Image\" WIDTH=\"50%\" HEIGHT=\"200\" BORDER=\"\"></p>                   Name:Khai Munoz  Medical Record Number:[id number]  CSN: [id number]    YOB: 1945   Age: 71 y.o.  Sex: male  HT:1.753 m (5' 9\") WT: 69 kg (152 lb 1.9 oz)          Admit Date: 2/15/2017     Discharge Date:   Today's Date: 2/19/2017  Attending Doctor:  Calvin Paulino M.D.                  Allergies:  Declomycin          Your appointments     Apr 12, 2017  2:30 PM   FOLLOW UP with James Davila M.D.   [facility] (--)    [address], [address]  [place] [id number] [phone]              Follow-up Information     1. Follow up with Calvin Paulino M.D. In 2 weeks.    Specialties:  Surgery, Radiology    Contact information    [address]  [place] [id number] [phone]           Medication List      Take these Medications        Instructions    ADVAIR DISKUS 250-50 MCG/DOSE Aepb   Generic drug:  fluticasone-salmeterol    Take 1 Puff by mouth 2 Times a Day.   Dose:  1 Puff       aspirin 81 MG tablet    Take 81 mg by mouth every day.   Dose:  81 mg       atorvastatin 80 MG tablet   Commonly known as:  LIPITOR    Take 80 mg by mouth every day.   Dose:  80 mg       CALCIUM CITRATE PO    Take 1 Tab by mouth 2 Times a Day. (With Vitamin D)   Dose:  1 Tab       CRANBERRY CONCENTRATE PO    Take 2 Tabs by mouth every day.   Dose:  2 Tab       diltiazem 60 MG Tabs   Commonly known as:  CARDIZEM    Take 1 Tab by mouth 2 Times a Day.   Dose:  1 Tab       hydrocodone-acetaminophen 5-325 MG Tabs per tablet   Commonly known as:  NORCO    Take 1-2 Tabs by mouth every four hours as needed.   Dose:  1-2 Tab       pyridoxine 100 MG tablet    Take 100 mg by mouth every day. Vitamin B-6   Dose:  100 mg       SM COENZYME Q-10 100 MG Caps   Generic drug:  Coenzyme Q10    Take 100 mg by mouth every day.   Dose:  100 mg       SPIRIVA " HANDIHALER 18 MCG Caps   Generic drug:  tiotropium    Inhale 1 Cap by mouth every day.   Dose:  1 Cap

## 2017-02-15 NOTE — IP AVS SNAPSHOT
" Home Care Instructions                                                                                                                  Name:Khai Mnuoz  Medical Record Number:[id number]  CSN: [id number]    YOB: 1945   Age: 71 y.o.  Sex: male  HT:1.753 m (5' 9\") WT: 69 kg (152 lb 1.9 oz)          Admit Date: 2/15/2017     Discharge Date:   Today's Date: 2/19/2017  Attending Doctor:  Calvin Paulino M.D.                  Allergies:  Declomycin            Discharge Instructions       Discharge Instructions    Discharged to home by car with relative. Discharged via wheelchair, hospital escort: Yes.  Special equipment needed: Not Applicable    Be sure to schedule a follow-up appointment with your primary care doctor or any specialists as instructed.     Discharge Plan:   Smoking Cessation Offered: Patient Refused  Influenza Vaccine Indication: Patient Refuses    I understand that a diet low in cholesterol, fat, and sodium is recommended for good health. Unless I have been given specific instructions below for another diet, I accept this instruction as my diet prescription.   Other diet: Regular    Special Instructions: None    · Is patient discharged on Warfarin / Coumadin?   No     · Is patient Post Blood Transfusion?  No    Remove bandage after 48 hours   May shower after bandage removed    Abdominal Aortic Aneurysm Open Repair, Care After  Refer to this sheet in the next few weeks. These instructions provide you with information on caring for yourself after your procedure. Your health care provider may also give you more specific instructions. Your treatment has been planned according to current medical practices, but problems sometimes occur. Call your health care provider if you have any problems or questions after your procedure.   WHAT TO EXPECT AFTER THE PROCEDURE   After your procedure, it is typical to have the following sensations:  · Pain at the incision site. Pain-relieving medicine will be " given to control this.  · Increased tiredness. It can take up to 3 months before you resume all your normal activities.  HOME CARE INSTRUCTIONS   · Get plenty of rest, but move around frequently for short periods or take short walks as directed by your health care provider. Gradually increase the distance you walk.    · Keep the incision area clean and dry. Remove or change bandages (dressings) only as directed by your health care provider. You may have skin adhesive strips over the incision area. Do not take the strips off. They will fall off on their own.    · Take showers once your health care provider approves. Until then, only take sponge baths. Pat incisions dry. Do not rub incisions with a washcloth or towel. Do not take tub baths or go swimming until your health care provider approves.    · Check your incision area every day for increased pain, swelling, redness, or fluid leaking from the incision. These may be signs of an infection.    · Only take over-the-counter or prescription medicines as directed by your health care provider.    · Limit activities as directed by your health care provider. Avoid strenuous activity and heavy lifting for 6-8 weeks.    · Do not drive until your health care provider approves.    · Drink enough fluids to keep your urine clear or pale yellow.    · Make any lifestyle changes recommended by your health care provider. This may include:    · Quitting smoking.    · Managing your blood pressure.    · Reducing stress.    · Eating healthy foods that are good for your heart.    · Getting regular exercise.    · Follow up with your health care provider as directed.     SEEK MEDICAL CARE IF:   1. You have increasing pain.    2. You have a fever.    3. You have chills.   4. You develop redness, swelling, increased pain, or drainage in the incision area.    5. You notice a bad smell coming from the incision area or dressing.     6. You notice that the edges of the incision are not staying  together after the stitches or staples have been removed.    7. You have persistent nausea or vomiting.  8. You develop a rash.   SEEK IMMEDIATE MEDICAL CARE IF:   · You have dizziness or fainting while standing.    · You have difficulty breathing.      This information is not intended to replace advice given to you by your health care provider. Make sure you discuss any questions you have with your health care provider.    Pain Medicine Instructions  HOW CAN PAIN MEDICINE AFFECT ME?  You were prescribed pain medicine. This medicine may:  · Make you tired or sleepy.  · Affect how well you can:  · Drive  · Do certain activities.  Pain medicine may not make all of your pain go away. You should be comfortable enough to:  · Move.  · Breathe.  · Take care of yourself.  HOW OFTEN SHOULD I TAKE PAIN MEDICINE AND HOW MUCH SHOULD I TAKE?  9. Take pain medicine only as told by your doctor and only as needed for pain.  10. You do not need to take pain medicine if you are not having pain, unless your doctor tells you to do that.  11. You can take less than the prescribed dose if you find that less medicine helps your pain.  WHAT SHOULD I AVOID WHILE I AM TAKING PAIN MEDICINE?  Follow these instructions after you start taking pain medicine, while you are taking the medicine, and for 8 hours after you stop taking the medicine:  · Do not drive.  · Do not use machinery.  · Do not use power tools.  · Do not sign legal documents.  · Do not drink alcohol.  · Do not take sleeping pills.  · Do not take care of children by yourself.  · Do not do any activities that involve climbing or being in high places.  · Do not go into any body of water unless there is an adult nearby who can watch and help you. This includes:  · Lakes.  · Rivers.  · Oceans.  · Spas.  · Swimming pools.  HOW CAN I KEEP OTHERS SAFE WHILE I AM TAKING PAIN MEDICINE?  · Store your pain medicine as told by your doctor. Make sure that you keep it where children and pets  cannot reach it.  · Do not share your pain medicine with anyone.  · Do not save any leftover pills. If you have any leftover pain medicine, get rid of it or destroy it as told by your doctor.  WHAT ELSE DO I NEED TO KNOW ABOUT TAKING PAIN MEDICINE?  · Use a poop (stool) softener if you have trouble pooping (constipation) because of your pain medicine. Eating more fruits and vegetables also helps with constipation.  · Write down the times when you take your pain medicine. Look at the times before you take your next dose of medicine.  · If your pain is very bad, do not take more pills than told by your doctor. Call your doctor for help.  · Your pain medicine might have acetaminophen in it. Do not take any other acetaminophen while you are taking this medicine. An overdose of acetaminophen can do very bad damage to your liver. If you are taking any medicines in addition to your pain medicine, check the active ingredients on those medicines to see if acetaminophen is listed.  WHEN SHOULD I CALL MY DOCTOR?  · Your medicine is not helping the pain.  · You do either of these soon after you take the medicine:  · Throw up (vomit).  · Have watery poop (diarrhea).  · You have new pain in areas that did not hurt before.  · You have an allergic reaction to your medicine. This may include:  · Feeling itchy.  · Swelling.  · Feeling dizzy.  · Getting a new rash.  WHEN SHOULD I CALL 911 OR GO TO THE EMERGENCY ROOM?  · You feel dizzy or you faint.  · You feel very confused.  · You throw up again and again.  · Your skin or lips turn pale or bluish in color.  · You are:  · Short of breath.  · Breathing much more slowly than usual.  · You have a very bad allergic reaction to your medicine. This includes:  · Developing a swollen tongue.  · Having trouble breathing.     This information is not intended to replace advice given to you by your health care provider. Make sure you discuss any questions you have with your health care  provider.     Document Released: 06/05/2009 Document Revised: 05/03/2016 Document Reviewed: 10/22/2015  TERMINAL[employer]OUR Interactive Patient Education ©2016 [employer]    Incentive Spirometer  An incentive spirometer is a tool that can help keep your lungs clear and active. This tool measures how well you are filling your lungs with each breath. Taking long, deep breaths may help reverse or decrease the chance of developing breathing (pulmonary) problems (especially infection) following:  · Surgery of the chest or abdomen.  · Surgery if you have a history of smoking or a lung problem.  · A long period of time when you are unable to move or be active.  BEFORE THE PROCEDURE   · If the spirometer includes an indicator to show your best effort, your nurse or respiratory therapist will set it to a desired goal.  · If possible, sit up straight or lean slightly forward. Try not to slouch.  · Hold the incentive spirometer in an upright position.  INSTRUCTIONS FOR USE   12. Sit on the edge of your bed if possible, or sit up as far as you can in bed or on a chair.  13. Hold the incentive spirometer in an upright position.  14. Breathe out normally.  15. Place the mouthpiece in your mouth and seal your lips tightly around it.  16. Breathe in slowly and as deeply as possible, raising the piston or the ball toward the top of the column.  17. Hold your breath for 3-5 seconds or for as long as possible. Allow the piston or ball to fall to the bottom of the column.  18. Remove the mouthpiece from your mouth and breathe out normally.  19. Rest for a few seconds and repeat Steps 1 through 7 at least 10 times every 1-2 hours when you are awake. Take your time and take a few normal breaths between deep breaths.  20. The spirometer may include an indicator to show your best effort. Use the indicator as a goal to work toward during each repetition.  21. After each set of 10 deep breaths, practice coughing to be sure your lungs are clear. If  you have an incision (the cut made at the time of surgery), support your incision when coughing by placing a pillow or rolled-up towels firmly against it.  Once you are able to get out of bed, walk around indoors and cough well. You may stop using the incentive spirometer when instructed by your caregiver.   RISKS AND COMPLICATIONS  · Breathing too quickly may cause dizziness. At an extreme, this could cause you to pass out. Take your time so you do not get dizzy or light-headed.  · If you are in pain, you may need to take or ask for pain medication before doing incentive spirometry. It is harder to take a deep breath if you are having pain.  AFTER USE  · Rest and breathe slowly and easily.  · It can be helpful to keep a log of your progress. Your caregiver can provide you with a simple table to help with this.  If you are using the spirometer at home, follow these instructions:  SEEK MEDICAL CARE IF:   · You are having difficultly using the spirometer.  · You have trouble using the spirometer as often as instructed.  · Your pain medication is not giving enough relief while using the spirometer.  · You develop fever of 100.5°F (38.1°C) or higher.  SEEK IMMEDIATE MEDICAL CARE IF:   · You cough up bloody sputum that had not been present before.  · You develop fever of 102°F (38.9°C) or greater.  · You develop worsening pain at or near the incision site.  MAKE SURE YOU:   · Understand these instructions.  · Will watch your condition.  · Will get help right away if you are not doing well or get worse.     This information is not intended to replace advice given to you by your health care provider. Make sure you discuss any questions you have with your health care provider.     Document Released: 04/29/2008 Document Revised: 01/08/2016 Document Reviewed: 07/27/2015  deskwolf Interactive Patient Education ©2016 [employer]        Your appointments     Apr 12, 2017  2:30 PM   FOLLOW UP with James Davila M.D.    [facility] (--)    [address], [address]  [place] [id number] [phone]              Follow-up Information     1. Follow up with Calvin Paulino M.D. In 2 weeks.    Specialties:  Surgery, Radiology    Contact information    [address]  [place] [id number] [phone]           Discharge Medication Instructions:    Below are the medications your physician expects you to take upon discharge:    Review all your home medications and newly ordered medications with your doctor and/or pharmacist. Follow medication instructions as directed by your doctor and/or pharmacist.    Please keep your medication list with you and share with your physician.               Medication List      START taking these medications        Instructions    hydrocodone-acetaminophen 5-325 MG Tabs per tablet   Commonly known as:  NORCO    Take 1-2 Tabs by mouth every four hours as needed.   Dose:  1-2 Tab         CONTINUE taking these medications        Instructions    ADVAIR DISKUS 250-50 MCG/DOSE Aepb   Generic drug:  fluticasone-salmeterol    Take 1 Puff by mouth 2 Times a Day.   Dose:  1 Puff       aspirin 81 MG tablet    Take 81 mg by mouth every day.   Dose:  81 mg       atorvastatin 80 MG tablet   Last time this was given:  80 mg on 2/19/2017  9:47 AM   Commonly known as:  LIPITOR    Take 80 mg by mouth every day.   Dose:  80 mg       CALCIUM CITRATE PO    Take 1 Tab by mouth 2 Times a Day. (With Vitamin D)   Dose:  1 Tab       CRANBERRY CONCENTRATE PO    Take 2 Tabs by mouth every day.   Dose:  2 Tab       diltiazem 60 MG Tabs   Last time this was given:  60 mg on 2/19/2017  9:46 AM   Commonly known as:  CARDIZEM    Take 1 Tab by mouth 2 Times a Day.   Dose:  1 Tab       pyridoxine 100 MG tablet   Last time this was given:  100 mg on 2/19/2017  9:47 AM    Take 100 mg by mouth every day. Vitamin B-6   Dose:  100 mg       SM COENZYME Q-10 100 MG Caps   Generic drug:  Coenzyme Q10     Take 100 mg by mouth every day.   Dose:  100 mg       SPIRIVA HANDIHALER 18 MCG Caps   Last time this was given:  1 Cap on 2/19/2017  9:47 AM   Generic drug:  tiotropium    Inhale 1 Cap by mouth every day.   Dose:  1 Cap               Instructions           Diet / Nutrition:    Follow any diet instructions given to you by your doctor or the dietician, including how much salt (sodium) you are allowed each day.    If you are overweight, talk to your doctor about a weight reduction plan.    Activity:    Remain physically active following your doctor's instructions about exercise and activity.    Rest often.     Any time you become even a little tired or short of breath, SIT DOWN and rest.    Worsening Symptoms:    Report any of the following signs and symptoms to the doctor's office immediately:    *Pain of jaw, arm, or neck  *Chest pain not relieved by medication                               *Dizziness or loss of consciousness  *Difficulty breathing even when at rest   *More tired than usual                                       *Bleeding drainage or swelling of surgical site  *Swelling of feet, ankles, legs or stomach                 *Fever (>100ºF)  *Pink or blood tinged sputum  *Weight gain (3lbs/day or 5lbs /week)           *Shock from internal defibrillator (if applicable)  *Palpitations or irregular heartbeats                *Cool and/or numb extremities    Stroke Awareness    Common Risk Factors for Stroke include:    Age  Atrial Fibrillation  Carotid Artery Stenosis  Diabetes Mellitus  Excessive alcohol consumption  High blood pressure  Overweight   Physical inactivity  Smoking    Warning signs and symptoms of a stroke include:    *Sudden numbness or weakness of the face, arm or leg (especially on one side of the body).  *Sudden confusion, trouble speaking or understanding.  *Sudden trouble seeing in one or both eyes.  *Sudden trouble walking, dizziness, loss of balance or coordination.Sudden severe  headache with no known cause.    It is very important to get treatment quickly when a stroke occurs. If you experience any of the above warning signs, call 911 immediately.                   Disclaimer         Quit Smoking / Tobacco Use:    I understand the use of any tobacco products increases my chance of suffering from future heart disease or stroke and could cause other illnesses which may shorten my life. Quitting the use of tobacco products is the single most important thing I can do to improve my health. For further information on smoking / tobacco cessation call a Toll Free Quit Line at 1-927.358.1854 (*[employer] [place]) or 1-816.961.7255 (American Lung Association) or you can access the web based program at www.lungusa.org.    [place] Tobacco Users Help Line:  (837) 993-5925       Toll Free: 1-217.951.8660  Quit Tobacco Program [facility] (766)924-0107    Crisis Hotline:    [place] Crisis Hotline:  7-383-UULXMZE or 1-568.673.4617    [place] Crisis Hotline:    1-834.467.2194 or 677-167-4970    Discharge Survey:   Thank you for choosing [facility]. We hope we did everything we could to make your hospital stay a pleasant one. You may be receiving a phone survey and we would appreciate your time and participation in answering the questions. Your input is very valuable to us in our efforts to improve our service to our patients and their families.        My signature on this form indicates that:    1. I have reviewed and understand the above information.  2. My questions regarding this information have been answered to my satisfaction.  3. I have formulated a plan with my discharge nurse to obtain my prescribed medications for home.                  Disclaimer         __________________________________                     __________       ________                       Patient Signature                                                 Date                    Time

## 2017-02-15 NOTE — IP AVS SNAPSHOT
Natera Access Code: Activation code not generated  Current Natera Status: Patient Declined    Your email address is not on file at [facility].  Email Addresses are required for you to sign up for Natera, please contact [phone] to verify your personal information and to provide your email address prior to attempting to register for Natera.    Khai Munoz  [address] [place] [address]    Natera  A secure, online tool to manage your health information     [facility]’s Natera® is a secure, online tool that connects you to your personalized health information from the privacy of your home -- day or night - making it very easy for you to manage your healthcare. Once the activation process is completed, you can even access your medical information using the Natera mari, which is available for free in the Apple Mari store or Google Play store.     To learn more about Natera, visit [email or web address]    There are two levels of access available (as shown below):   My Chart Features  [facility] Primary Care Doctor [facility]  Specialists [facility]  Urgent  Care Non-[facility] Primary Care Doctor   Email your healthcare team securely and privately 24/7 X X X    Manage appointments: schedule your next appointment; view details of past/upcoming appointments X      Request prescription refills. X      View recent personal medical records, including lab and immunizations X X X X   View health record, including health history, allergies, medications X X X X   Read reports about your outpatient visits, procedures, consult and ER notes X X X X   See your discharge summary, which is a recap of your hospital and/or ER visit that includes your diagnosis, lab results, and care plan X X  X     How to register for eyesFindert:  Once your e-mail address has been verified, follow the following steps to sign up for eyesFindert.     1. Go to  [email or web address]  2. Click on the Sign Up Now box,  which takes you to the New Member Sign Up page. You will need to provide the following information:  a. Enter your Fliplife Access Code exactly as it appears at the top of this page. (You will not need to use this code after you’ve completed the sign-up process. If you do not sign up before the expiration date, you must request a new code.)   b. Enter your date of birth.   c. Enter your home email address.   d. Click Submit, and follow the next screen’s instructions.  3. Create a Fliplife ID. This will be your Fliplife login ID and cannot be changed, so think of one that is secure and easy to remember.  4. Create a Fliplife password. You can change your password at any time.  5. Enter your Password Reset Question and Answer. This can be used at a later time if you forget your password.   6. Enter your e-mail address. This allows you to receive e-mail notifications when new information is available in Fliplife.  7. Click Sign Up. You can now view your health information.    For assistance activating your Fliplife account, call [phone]

## 2017-02-16 LAB
ANION GAP SERPL CALC-SCNC: 11 MMOL/L (ref 0–11.9)
ANION GAP SERPL CALC-SCNC: 8 MMOL/L (ref 0–11.9)
BASOPHILS # BLD AUTO: 0.1 % (ref 0–1.8)
BASOPHILS # BLD AUTO: 0.2 % (ref 0–1.8)
BASOPHILS # BLD: 0.01 K/UL (ref 0–0.12)
BASOPHILS # BLD: 0.04 K/UL (ref 0–0.12)
BUN SERPL-MCNC: 14 MG/DL (ref 8–22)
BUN SERPL-MCNC: 15 MG/DL (ref 8–22)
CALCIUM SERPL-MCNC: 8.7 MG/DL (ref 8.5–10.5)
CALCIUM SERPL-MCNC: 8.7 MG/DL (ref 8.5–10.5)
CHLORIDE SERPL-SCNC: 106 MMOL/L (ref 96–112)
CHLORIDE SERPL-SCNC: 106 MMOL/L (ref 96–112)
CO2 SERPL-SCNC: 19 MMOL/L (ref 20–33)
CO2 SERPL-SCNC: 19 MMOL/L (ref 20–33)
CREAT SERPL-MCNC: 1.26 MG/DL (ref 0.5–1.4)
CREAT SERPL-MCNC: 1.32 MG/DL (ref 0.5–1.4)
EOSINOPHIL # BLD AUTO: 0 K/UL (ref 0–0.51)
EOSINOPHIL # BLD AUTO: 0 K/UL (ref 0–0.51)
EOSINOPHIL NFR BLD: 0 % (ref 0–6.9)
EOSINOPHIL NFR BLD: 0 % (ref 0–6.9)
ERYTHROCYTE [DISTWIDTH] IN BLOOD BY AUTOMATED COUNT: 49.2 FL (ref 35.9–50)
ERYTHROCYTE [DISTWIDTH] IN BLOOD BY AUTOMATED COUNT: 49.6 FL (ref 35.9–50)
GFR SERPL CREATININE-BSD FRML MDRD: 53 ML/MIN/1.73 M 2
GFR SERPL CREATININE-BSD FRML MDRD: 56 ML/MIN/1.73 M 2
GLUCOSE SERPL-MCNC: 152 MG/DL (ref 65–99)
GLUCOSE SERPL-MCNC: 174 MG/DL (ref 65–99)
HCT VFR BLD AUTO: 37.2 % (ref 42–52)
HCT VFR BLD AUTO: 39.9 % (ref 42–52)
HGB BLD-MCNC: 12.8 G/DL (ref 14–18)
HGB BLD-MCNC: 13.2 G/DL (ref 14–18)
IMM GRANULOCYTES # BLD AUTO: 0.07 K/UL (ref 0–0.11)
IMM GRANULOCYTES # BLD AUTO: 0.07 K/UL (ref 0–0.11)
IMM GRANULOCYTES NFR BLD AUTO: 0.4 % (ref 0–0.9)
IMM GRANULOCYTES NFR BLD AUTO: 0.5 % (ref 0–0.9)
LACTATE BLD-SCNC: 1.2 MMOL/L (ref 0.5–2)
LACTATE BLD-SCNC: 3.4 MMOL/L (ref 0.5–2)
LYMPHOCYTES # BLD AUTO: 0.63 K/UL (ref 1–4.8)
LYMPHOCYTES # BLD AUTO: 1.16 K/UL (ref 1–4.8)
LYMPHOCYTES NFR BLD: 4.4 % (ref 22–41)
LYMPHOCYTES NFR BLD: 5.9 % (ref 22–41)
MCH RBC QN AUTO: 32.8 PG (ref 27–33)
MCH RBC QN AUTO: 33.6 PG (ref 27–33)
MCHC RBC AUTO-ENTMCNC: 33.1 G/DL (ref 33.7–35.3)
MCHC RBC AUTO-ENTMCNC: 34.4 G/DL (ref 33.7–35.3)
MCV RBC AUTO: 97.6 FL (ref 81.4–97.8)
MCV RBC AUTO: 99.3 FL (ref 81.4–97.8)
MONOCYTES # BLD AUTO: 0.43 K/UL (ref 0–0.85)
MONOCYTES # BLD AUTO: 1.15 K/UL (ref 0–0.85)
MONOCYTES NFR BLD AUTO: 3 % (ref 0–13.4)
MONOCYTES NFR BLD AUTO: 5.8 % (ref 0–13.4)
NEUTROPHILS # BLD AUTO: 13.03 K/UL (ref 1.82–7.42)
NEUTROPHILS # BLD AUTO: 17.26 K/UL (ref 1.82–7.42)
NEUTROPHILS NFR BLD: 87.7 % (ref 44–72)
NEUTROPHILS NFR BLD: 92 % (ref 44–72)
NRBC # BLD AUTO: 0 K/UL
NRBC # BLD AUTO: 0 K/UL
NRBC BLD AUTO-RTO: 0 /100 WBC
NRBC BLD AUTO-RTO: 0 /100 WBC
PLATELET # BLD AUTO: 173 K/UL (ref 164–446)
PLATELET # BLD AUTO: 186 K/UL (ref 164–446)
PMV BLD AUTO: 10.5 FL (ref 9–12.9)
PMV BLD AUTO: 9.8 FL (ref 9–12.9)
POTASSIUM SERPL-SCNC: 4.4 MMOL/L (ref 3.6–5.5)
POTASSIUM SERPL-SCNC: 4.4 MMOL/L (ref 3.6–5.5)
RBC # BLD AUTO: 3.81 M/UL (ref 4.7–6.1)
RBC # BLD AUTO: 4.02 M/UL (ref 4.7–6.1)
SODIUM SERPL-SCNC: 133 MMOL/L (ref 135–145)
SODIUM SERPL-SCNC: 136 MMOL/L (ref 135–145)
WBC # BLD AUTO: 14.2 K/UL (ref 4.8–10.8)
WBC # BLD AUTO: 19.7 K/UL (ref 4.8–10.8)

## 2017-02-16 PROCEDURE — 83605 ASSAY OF LACTIC ACID: CPT

## 2017-02-16 PROCEDURE — A9270 NON-COVERED ITEM OR SERVICE: HCPCS | Performed by: NURSE PRACTITIONER

## 2017-02-16 PROCEDURE — 85025 COMPLETE CBC W/AUTO DIFF WBC: CPT

## 2017-02-16 PROCEDURE — 770006 HCHG ROOM/CARE - MED/SURG/GYN SEMI*

## 2017-02-16 PROCEDURE — 700102 HCHG RX REV CODE 250 W/ 637 OVERRIDE(OP): Performed by: NURSE PRACTITIONER

## 2017-02-16 PROCEDURE — 36415 COLL VENOUS BLD VENIPUNCTURE: CPT

## 2017-02-16 PROCEDURE — 700101 HCHG RX REV CODE 250: Performed by: NURSE PRACTITIONER

## 2017-02-16 PROCEDURE — 80048 BASIC METABOLIC PNL TOTAL CA: CPT

## 2017-02-16 RX ORDER — BUDESONIDE AND FORMOTEROL FUMARATE DIHYDRATE 160; 4.5 UG/1; UG/1
2 AEROSOL RESPIRATORY (INHALATION) 2 TIMES DAILY
Status: DISCONTINUED | OUTPATIENT
Start: 2017-02-16 | End: 2017-02-19 | Stop reason: HOSPADM

## 2017-02-16 RX ADMIN — BUDESONIDE AND FORMOTEROL FUMARATE DIHYDRATE 2 PUFF: 160; 4.5 AEROSOL RESPIRATORY (INHALATION) at 08:15

## 2017-02-16 RX ADMIN — BUDESONIDE AND FORMOTEROL FUMARATE DIHYDRATE 2 PUFF: 160; 4.5 AEROSOL RESPIRATORY (INHALATION) at 20:11

## 2017-02-16 RX ADMIN — TIOTROPIUM BROMIDE 1 CAPSULE: 18 CAPSULE ORAL; RESPIRATORY (INHALATION) at 08:15

## 2017-02-16 RX ADMIN — ATORVASTATIN CALCIUM 80 MG: 40 TABLET, FILM COATED ORAL at 08:14

## 2017-02-16 RX ADMIN — OXYCODONE HYDROCHLORIDE 5 MG: 5 TABLET ORAL at 08:06

## 2017-02-16 RX ADMIN — POTASSIUM CHLORIDE, DEXTROSE MONOHYDRATE AND SODIUM CHLORIDE: 150; 5; 450 INJECTION, SOLUTION INTRAVENOUS at 17:25

## 2017-02-16 RX ADMIN — POTASSIUM CHLORIDE, DEXTROSE MONOHYDRATE AND SODIUM CHLORIDE: 150; 5; 450 INJECTION, SOLUTION INTRAVENOUS at 05:44

## 2017-02-16 RX ADMIN — DILTIAZEM HYDROCHLORIDE 60 MG: 60 TABLET, FILM COATED ORAL at 20:10

## 2017-02-16 RX ADMIN — OXYCODONE HYDROCHLORIDE 5 MG: 5 TABLET ORAL at 16:15

## 2017-02-16 RX ADMIN — DILTIAZEM HYDROCHLORIDE 60 MG: 60 TABLET, FILM COATED ORAL at 09:00

## 2017-02-16 RX ADMIN — ASPIRIN 81 MG: 81 TABLET ORAL at 09:00

## 2017-02-16 RX ADMIN — Medication 100 MG: at 08:15

## 2017-02-16 ASSESSMENT — PAIN SCALES - GENERAL
PAINLEVEL_OUTOF10: 0
PAINLEVEL_OUTOF10: 2
PAINLEVEL_OUTOF10: 0
PAINLEVEL_OUTOF10: 4
PAINLEVEL_OUTOF10: 6

## 2017-02-16 ASSESSMENT — LIFESTYLE VARIABLES: EVER_SMOKED: YES

## 2017-02-16 ASSESSMENT — COPD QUESTIONNAIRES
DURING THE PAST 4 WEEKS HOW MUCH DID YOU FEEL SHORT OF BREATH: NONE/LITTLE OF THE TIME
COPD SCREENING SCORE: 4
DO YOU EVER COUGH UP ANY MUCUS OR PHLEGM?: NO/ONLY WITH OCCASIONAL COLDS OR INFECTIONS
HAVE YOU SMOKED AT LEAST 100 CIGARETTES IN YOUR ENTIRE LIFE: YES

## 2017-02-16 NOTE — OP REPORT
DATE OF SERVICE:  02/15/2017    PREPROCEDURE DIAGNOSIS:  Abdominal aortic aneurysm.    POSTPROCEDURE DIAGNOSIS:  Abdominal aortic aneurysm.    PROCEDURES PERFORMED:  1.  Endovascular repair of abdominal aortic aneurysm.  2.  Exposure of bilateral common femoral arteries for delivery of   endoprosthesis.  3.  Catheter placement, aorta.  4.  Aortogram.    SURGEON:  Calvin Paulino MD    FIRST ASSISTANT:  PABLO Rosa    ANESTHESIA:  General.    PROCEDURE IN DETAIL:  Patient was taken to the operating suite, placed in the   supine position.  After adequate anesthesia, the patient's abdomen and   bilateral groins were shaved, prepped and draped in sterile fashion.  Small   incision was made in oblique fashion overlying the left inguinal ligament.    Incision was carried down through the subcutaneous tissue and the left common   femoral artery was circumferentially dissected and isolated.  Mirror image   incision was made on the contralateral side and the right common femoral   artery was circumferentially dissected and isolated.  Thin-wall access needles   were placed bilaterally and wires were advanced into the aorta.  A 6-Swazi   sheaths were placed using a Seldinger technique.  A marker angiogram catheter   was brought up the right side and an angiogram was performed.  The angiogram   was used to determine the appropriate size of the endoprosthesis.  A stiff   wire were placed bilaterally and a 16-Swazi sheath was advanced up the right   side and a 12-Swazi sheath was advanced up the left side.  A pre-deployment   angiogram was performed and a 28 mm x 14 mm x 16 cm [place] endoprosthesis was   deployed just below the renal arteries.  Contralateral gate was subsequently   engaged and a wire was advanced.  A retrograde angiogram was performed to   select the appropriate contralateral limb, which was a 14 cmx14 mm.  This was   advanced ____ sheath protection into the contralateral limb and was deployed.     Post-deployment angioplasty with a compliant balloon was used at all gaits   and junctions.  Completion angiogram demonstrated no evidence of endoleak and   good position of the patient's endograft.  All sheaths, catheters, wires were   retrieved.  The femoral arteries were closed using interrupted 5-0 Prolene   sutures bilaterally.  After fore flushing and back flushing, flow was   established to the legs.  Protamine was administered at the completion of the   case.  Vicryl 3-0 subcutaneous sutures were placed followed by Stratafix.    Sterile dressings were applied.       ____________________________________     MD EFREN ZARATE / SUKHJINDER    DD:  02/15/2017 16:53:48  DT:  02/15/2017 20:30:36    D#:  [id number]  Job#:  [id number]

## 2017-02-16 NOTE — PROGRESS NOTES
Vascular Surgery     POD #1 s/p endovascular AAA repair    Reports some mild lower abdominal pain, incisions bandages clean and dry     Check labs     Keep an extra day for observation     Calvin Paulino MD

## 2017-02-16 NOTE — PROGRESS NOTES
"Received pt from PACU to [facility] via gurney this shift, stable with no apparent distress noted. No AMS//A&Ox4, pt appears agitated upon admission, VSS. Per pt pain is well controlled at this time, no PRNs given. Breathing even/unlabored, noted dim lung sounds, sating at low 90's at 2Lpm per face mask/ kept HOB elevated. Abdomen soft/nontender, (+)BSx4, (-)N/V, per pt last BM was PTA, unable to pass flatus at this time. PIV intact/patent, (-)inifl/phleb with IVF infusing at 75ml/hr, started pt on clear liquids with standing order to advance as tolerated. Dressing to bilateral groin C/D/I. Bladder continent. Pt refused scheduled meds this shift as pt has been insisting to take home meds and verbalized \" im not paying 5 dollars per pill in here\", explained to pt and SO regarding policy for home meds, verbalized understanding but still opted to refused. SBA with ambulation. ROM within functional limits. Oriented with room set-up. Call light within reach/bed on lowest position. Care initiated. Will continue with POC.  "

## 2017-02-16 NOTE — OR NURSING
Report called to caren HART. Plan of care discussed. Patient updated on plan of care. Patient reports 2-3/10 pain at groin sites, tolerable per patient. Groin sites remain CDI and soft. Patient denies nausea at this time. ART line removed from right radial site, hemostasis achieved, pressure dressing applied. IVF infusing. VSS. D/C prescription on front of chart.

## 2017-02-16 NOTE — OR NURSING
"Patient expresses feeling anxious and has a \"nervous stomach.\" Medicated with PRN midazolam per anesthesia record.   "

## 2017-02-16 NOTE — CARE PLAN
Problem: Safety  Goal: Will remain free from injury  Oriented with room set-up, call light within reach, bed on lowest position.    Problem: Knowledge Deficit  Goal: Knowledge of disease process/condition, treatment plan, diagnostic tests, and medications will improve  Discussed POC within shift with pt.

## 2017-02-17 LAB
ALBUMIN SERPL BCP-MCNC: 2.9 G/DL (ref 3.2–4.9)
ALBUMIN/GLOB SERPL: 1 G/DL
ALP SERPL-CCNC: 19 U/L (ref 30–99)
ALT SERPL-CCNC: 11 U/L (ref 2–50)
ANION GAP SERPL CALC-SCNC: 6 MMOL/L (ref 0–11.9)
AST SERPL-CCNC: 28 U/L (ref 12–45)
BASOPHILS # BLD AUTO: 0.2 % (ref 0–1.8)
BASOPHILS # BLD: 0.03 K/UL (ref 0–0.12)
BILIRUB SERPL-MCNC: 0.7 MG/DL (ref 0.1–1.5)
BUN SERPL-MCNC: 12 MG/DL (ref 8–22)
CALCIUM SERPL-MCNC: 8.3 MG/DL (ref 8.5–10.5)
CHLORIDE SERPL-SCNC: 103 MMOL/L (ref 96–112)
CO2 SERPL-SCNC: 23 MMOL/L (ref 20–33)
CREAT SERPL-MCNC: 1.34 MG/DL (ref 0.5–1.4)
EOSINOPHIL # BLD AUTO: 0.04 K/UL (ref 0–0.51)
EOSINOPHIL NFR BLD: 0.3 % (ref 0–6.9)
ERYTHROCYTE [DISTWIDTH] IN BLOOD BY AUTOMATED COUNT: 50.4 FL (ref 35.9–50)
GFR SERPL CREATININE-BSD FRML MDRD: 52 ML/MIN/1.73 M 2
GLOBULIN SER CALC-MCNC: 3 G/DL (ref 1.9–3.5)
GLUCOSE SERPL-MCNC: 111 MG/DL (ref 65–99)
HCT VFR BLD AUTO: 34.9 % (ref 42–52)
HGB BLD-MCNC: 11.5 G/DL (ref 14–18)
IMM GRANULOCYTES # BLD AUTO: 0.09 K/UL (ref 0–0.11)
IMM GRANULOCYTES NFR BLD AUTO: 0.6 % (ref 0–0.9)
LYMPHOCYTES # BLD AUTO: 1.47 K/UL (ref 1–4.8)
LYMPHOCYTES NFR BLD: 10 % (ref 22–41)
MCH RBC QN AUTO: 32.7 PG (ref 27–33)
MCHC RBC AUTO-ENTMCNC: 33 G/DL (ref 33.7–35.3)
MCV RBC AUTO: 99.1 FL (ref 81.4–97.8)
MONOCYTES # BLD AUTO: 1.08 K/UL (ref 0–0.85)
MONOCYTES NFR BLD AUTO: 7.3 % (ref 0–13.4)
NEUTROPHILS # BLD AUTO: 12.01 K/UL (ref 1.82–7.42)
NEUTROPHILS NFR BLD: 81.6 % (ref 44–72)
NRBC # BLD AUTO: 0 K/UL
NRBC BLD AUTO-RTO: 0 /100 WBC
PLATELET # BLD AUTO: 137 K/UL (ref 164–446)
PMV BLD AUTO: 10.4 FL (ref 9–12.9)
POTASSIUM SERPL-SCNC: 4.1 MMOL/L (ref 3.6–5.5)
PROT SERPL-MCNC: 5.9 G/DL (ref 6–8.2)
RBC # BLD AUTO: 3.52 M/UL (ref 4.7–6.1)
SODIUM SERPL-SCNC: 132 MMOL/L (ref 135–145)
WBC # BLD AUTO: 14.7 K/UL (ref 4.8–10.8)

## 2017-02-17 PROCEDURE — 36415 COLL VENOUS BLD VENIPUNCTURE: CPT

## 2017-02-17 PROCEDURE — 700101 HCHG RX REV CODE 250: Performed by: NURSE PRACTITIONER

## 2017-02-17 PROCEDURE — 85025 COMPLETE CBC W/AUTO DIFF WBC: CPT

## 2017-02-17 PROCEDURE — 770006 HCHG ROOM/CARE - MED/SURG/GYN SEMI*

## 2017-02-17 PROCEDURE — A9270 NON-COVERED ITEM OR SERVICE: HCPCS | Performed by: NURSE PRACTITIONER

## 2017-02-17 PROCEDURE — 80053 COMPREHEN METABOLIC PANEL: CPT

## 2017-02-17 PROCEDURE — 700102 HCHG RX REV CODE 250 W/ 637 OVERRIDE(OP): Performed by: NURSE PRACTITIONER

## 2017-02-17 RX ADMIN — POTASSIUM CHLORIDE, DEXTROSE MONOHYDRATE AND SODIUM CHLORIDE: 150; 5; 450 INJECTION, SOLUTION INTRAVENOUS at 23:10

## 2017-02-17 RX ADMIN — BUDESONIDE AND FORMOTEROL FUMARATE DIHYDRATE 2 PUFF: 160; 4.5 AEROSOL RESPIRATORY (INHALATION) at 20:48

## 2017-02-17 RX ADMIN — POTASSIUM CHLORIDE, DEXTROSE MONOHYDRATE AND SODIUM CHLORIDE: 150; 5; 450 INJECTION, SOLUTION INTRAVENOUS at 05:42

## 2017-02-17 RX ADMIN — BUDESONIDE AND FORMOTEROL FUMARATE DIHYDRATE 2 PUFF: 160; 4.5 AEROSOL RESPIRATORY (INHALATION) at 10:26

## 2017-02-17 RX ADMIN — Medication 100 MG: at 10:25

## 2017-02-17 RX ADMIN — ASPIRIN 81 MG: 81 TABLET ORAL at 10:25

## 2017-02-17 RX ADMIN — ATORVASTATIN CALCIUM 80 MG: 40 TABLET, FILM COATED ORAL at 10:24

## 2017-02-17 RX ADMIN — DILTIAZEM HYDROCHLORIDE 60 MG: 60 TABLET, FILM COATED ORAL at 10:24

## 2017-02-17 RX ADMIN — DILTIAZEM HYDROCHLORIDE 60 MG: 60 TABLET, FILM COATED ORAL at 20:49

## 2017-02-17 RX ADMIN — OXYCODONE HYDROCHLORIDE 5 MG: 5 TABLET ORAL at 10:25

## 2017-02-17 RX ADMIN — TIOTROPIUM BROMIDE 1 CAPSULE: 18 CAPSULE ORAL; RESPIRATORY (INHALATION) at 10:26

## 2017-02-17 ASSESSMENT — PAIN SCALES - GENERAL
PAINLEVEL_OUTOF10: 0
PAINLEVEL_OUTOF10: 0
PAINLEVEL_OUTOF10: 5
PAINLEVEL_OUTOF10: 0

## 2017-02-17 NOTE — CARE PLAN
Problem: Venous Thromboembolism (VTW)/Deep Vein Thrombosis (DVT) Prevention:  Goal: Patient will participate in Venous Thrombosis (VTE)/Deep Vein Thrombosis (DVT)Prevention Measures  Outcome: PROGRESSING AS EXPECTED  SCD in place.     Problem: Pain Management  Goal: Pain level will decrease to patient’s comfort goal  Outcome: PROGRESSING AS EXPECTED  Oxycodone 5,10 prn as ordered.     Problem: Mobility  Goal: Risk for activity intolerance will decrease  Outcome: PROGRESSING AS EXPECTED  Ambulates to bathroom stand by assist.

## 2017-02-17 NOTE — PROGRESS NOTES
Pt c/o pain to lower abd, medicated per MAR. Bilateral groin dsg dry and intact. Positive pedal pulses 2+.

## 2017-02-17 NOTE — PROGRESS NOTES
Vascular Surgery     Awake alert  Reports interval improvement in abdominal pain   Abdomen soft, minimal tenderness   No loose stools     Tolerating diet but not much appetite     Labs reviewed    S/P Endovascular AAA repair   Probable mild ischemic colitis     Follow clinically   Do not think patient warrants flex sig at this point based on improving symptoms and clinical status     Repeat labs in am   Follow closely     Calvin Paulino MD

## 2017-02-17 NOTE — DISCHARGE PLANNING
Care Transition Team Assessment    Pt is a 70 y/o male admitted on 2/15/17 for abdominal aortic aneurysm.  Pt underwent AAA repair with Dr. Paulino.  SW met with pt at bedside to complete assessment.  Pt lives with spouse and a friend in [facility] (near [place]) and has good support at home.  Pt does not have any concerns about discharging home and plans to d/c with spouse when medically cleared. No needs anticipated.      Information Source  Orientation : Oriented x 4  Information Given By: Patient    Readmission Evaluation  Is this a readmission?: No    Elopement Risk  Legal Hold: No    Interdisciplinary Discharge Planning  Primary Care Physician: Dr. Baldo Fajardo  Lives with - Patient's Self Care Capacity: Spouse  Support Systems: Spouse / Significant Other  Housing / Facility: [address]  Do You Take your Prescribed Medications Regularly: Yes  Able to Return to Previous ADL's: Yes  Mobility Issues: No  Prior Services: None  Patient Expects to be Discharged to: Home  Assistance Needed: No  Durable Medical Equipment: None    Discharge Preparedness  What is your plan after discharge?: Home with help  What are your discharge supports?: Spouse  Prior Functional Level: Ambulatory, Drives Self, Independent with Activities of Daily Living, Independent with Medication Management    Functional Assesment  Prior Functional Level: Ambulatory, Drives Self, Independent with Activities of Daily Living, Independent with Medication Management    Finances  Financial Barriers to Discharge: No  Prescription Coverage: Yes (Pt cannot recall name of Rx insurance)    Vision / Hearing Impairment  Vision Impairment : Yes  Right Eye Vision: Wears Glasses  Left Eye Vision: Wears Glasses  Hearing Impairment: Patient Declines to Wear Hearing Device(s)    Values / Beliefs / Concerns  Spiritual Requests During Hospitalization: No  Special Hospitalization Concerns: None    Advance Directive  Advance Directive?: None    Domestic  Abuse  Have you ever been the victim of abuse or violence?: No  Physical Abuse or Sexual Abuse: No  Verbal Abuse or Emotional Abuse: No  Possible Abuse Reported to: Not Applicable    Psychological Assessment  History of Substance Abuse: None  History of Psychiatric Problems: No  Non-compliant with Treatment: No  Newly Diagnosed Illness: No    Discharge Risks or Barriers  Discharge risks or barriers?: No    Anticipated Discharge Information  Anticipated discharge disposition: Home  Discharge Address: [address] [place] [address]  Discharge Contact Phone Number: [phone]

## 2017-02-17 NOTE — PROGRESS NOTES
At baseline this shift, no apparent distress noted. No AMS/A&Ox4, vitals WNL. Denied pain on initial encounter, no PRNs given at this time. Breathing even/unlabored, noted dim lung sounds, occasional non productive coughing noted, refused O2 to be applied at HS, sating at low 90s. Abdomen soft/nonteder, (+)BSx4, (-)N/V, last BM was 2/16, per pt able to pass flatus. PIV intact/patent, (-)infil/phleb with IVF infusing at 75ml/hr. Independent/ambulatory. Continentx2. Dressing to bilateral groin C/D/I. Care provided. Needs attended. No concerns at this time. Will continue with POC.

## 2017-02-17 NOTE — PROGRESS NOTES
Assumed care of pt. Awake, alert and oriented x4. Denies pain and nausea. IVF infusing. On reg diet. Stand by assist to bathroom, voids adequate. No bed alarm indicated at this time. Pt calls appropriately. Call bell within reach.

## 2017-02-17 NOTE — CARE PLAN
Problem: Infection  Goal: Will remain free from infection  Kept dressing to bilateral groin C/D/I.    Problem: Mobility  Goal: Risk for activity intolerance will decrease  Ambulated to BR/hallway.

## 2017-02-17 NOTE — PROGRESS NOTES
Assumed care at 0700. Received report from night shift RN. Bedside report completed. AOx4.  C/o pain-medicated.  Denies nausea.  Tolerating diet. +voiding.  IVF infusing.  B/L groin dressings CDI. +pedal pulses. Pt call light and belongings within reach, fall precautions in place.

## 2017-02-18 LAB
ANION GAP SERPL CALC-SCNC: 10 MMOL/L (ref 0–11.9)
BASOPHILS # BLD AUTO: 0.3 % (ref 0–1.8)
BASOPHILS # BLD: 0.04 K/UL (ref 0–0.12)
BUN SERPL-MCNC: 9 MG/DL (ref 8–22)
CALCIUM SERPL-MCNC: 8.8 MG/DL (ref 8.5–10.5)
CHLORIDE SERPL-SCNC: 103 MMOL/L (ref 96–112)
CO2 SERPL-SCNC: 20 MMOL/L (ref 20–33)
CREAT SERPL-MCNC: 1.31 MG/DL (ref 0.5–1.4)
EOSINOPHIL # BLD AUTO: 0.18 K/UL (ref 0–0.51)
EOSINOPHIL NFR BLD: 1.2 % (ref 0–6.9)
ERYTHROCYTE [DISTWIDTH] IN BLOOD BY AUTOMATED COUNT: 50.4 FL (ref 35.9–50)
GFR SERPL CREATININE-BSD FRML MDRD: 54 ML/MIN/1.73 M 2
GLUCOSE SERPL-MCNC: 107 MG/DL (ref 65–99)
HCT VFR BLD AUTO: 39.5 % (ref 42–52)
HGB BLD-MCNC: 13.1 G/DL (ref 14–18)
IMM GRANULOCYTES # BLD AUTO: 0.06 K/UL (ref 0–0.11)
IMM GRANULOCYTES NFR BLD AUTO: 0.4 % (ref 0–0.9)
LACTATE BLD-SCNC: 3.3 MMOL/L (ref 0.5–2)
LYMPHOCYTES # BLD AUTO: 1.98 K/UL (ref 1–4.8)
LYMPHOCYTES NFR BLD: 13.7 % (ref 22–41)
MCH RBC QN AUTO: 33.2 PG (ref 27–33)
MCHC RBC AUTO-ENTMCNC: 33.2 G/DL (ref 33.7–35.3)
MCV RBC AUTO: 100.3 FL (ref 81.4–97.8)
MONOCYTES # BLD AUTO: 1 K/UL (ref 0–0.85)
MONOCYTES NFR BLD AUTO: 6.9 % (ref 0–13.4)
NEUTROPHILS # BLD AUTO: 11.24 K/UL (ref 1.82–7.42)
NEUTROPHILS NFR BLD: 77.5 % (ref 44–72)
NRBC # BLD AUTO: 0 K/UL
NRBC BLD AUTO-RTO: 0 /100 WBC
PLATELET # BLD AUTO: 158 K/UL (ref 164–446)
PMV BLD AUTO: 10.2 FL (ref 9–12.9)
POTASSIUM SERPL-SCNC: 4 MMOL/L (ref 3.6–5.5)
RBC # BLD AUTO: 3.94 M/UL (ref 4.7–6.1)
SODIUM SERPL-SCNC: 133 MMOL/L (ref 135–145)
WBC # BLD AUTO: 14.5 K/UL (ref 4.8–10.8)

## 2017-02-18 PROCEDURE — 700101 HCHG RX REV CODE 250: Performed by: NURSE PRACTITIONER

## 2017-02-18 PROCEDURE — 80048 BASIC METABOLIC PNL TOTAL CA: CPT

## 2017-02-18 PROCEDURE — 700102 HCHG RX REV CODE 250 W/ 637 OVERRIDE(OP): Performed by: NURSE PRACTITIONER

## 2017-02-18 PROCEDURE — 770006 HCHG ROOM/CARE - MED/SURG/GYN SEMI*

## 2017-02-18 PROCEDURE — 83605 ASSAY OF LACTIC ACID: CPT

## 2017-02-18 PROCEDURE — 36415 COLL VENOUS BLD VENIPUNCTURE: CPT

## 2017-02-18 PROCEDURE — 85025 COMPLETE CBC W/AUTO DIFF WBC: CPT

## 2017-02-18 PROCEDURE — A9270 NON-COVERED ITEM OR SERVICE: HCPCS | Performed by: NURSE PRACTITIONER

## 2017-02-18 RX ADMIN — BUDESONIDE AND FORMOTEROL FUMARATE DIHYDRATE 2 PUFF: 160; 4.5 AEROSOL RESPIRATORY (INHALATION) at 21:00

## 2017-02-18 RX ADMIN — Medication 100 MG: at 09:05

## 2017-02-18 RX ADMIN — ACETAMINOPHEN 1000 MG: 500 TABLET, FILM COATED ORAL at 18:01

## 2017-02-18 RX ADMIN — DILTIAZEM HYDROCHLORIDE 60 MG: 60 TABLET, FILM COATED ORAL at 21:00

## 2017-02-18 RX ADMIN — POTASSIUM CHLORIDE, DEXTROSE MONOHYDRATE AND SODIUM CHLORIDE: 150; 5; 450 INJECTION, SOLUTION INTRAVENOUS at 18:00

## 2017-02-18 RX ADMIN — ASPIRIN 81 MG: 81 TABLET ORAL at 09:05

## 2017-02-18 RX ADMIN — ATORVASTATIN CALCIUM 80 MG: 40 TABLET, FILM COATED ORAL at 09:05

## 2017-02-18 RX ADMIN — TIOTROPIUM BROMIDE 1 CAPSULE: 18 CAPSULE ORAL; RESPIRATORY (INHALATION) at 09:03

## 2017-02-18 RX ADMIN — DILTIAZEM HYDROCHLORIDE 60 MG: 60 TABLET, FILM COATED ORAL at 09:05

## 2017-02-18 RX ADMIN — BUDESONIDE AND FORMOTEROL FUMARATE DIHYDRATE 2 PUFF: 160; 4.5 AEROSOL RESPIRATORY (INHALATION) at 09:03

## 2017-02-18 ASSESSMENT — PAIN SCALES - GENERAL
PAINLEVEL_OUTOF10: 2
PAINLEVEL_OUTOF10: 2
PAINLEVEL_OUTOF10: 4
PAINLEVEL_OUTOF10: 2
PAINLEVEL_OUTOF10: 2

## 2017-02-18 NOTE — RESPIRATORY CARE
COPD EDUCATION by COPD CLINICAL EDUCATOR  2/18/2017 at 2:30 PM by Tiffanie Barcenas     Patient interviewed by COPD education team. Patient refused COPD program at this time.

## 2017-02-18 NOTE — PROGRESS NOTES
"  S: Awake alert  Reports feeling a little better  Still has very little appetite   No diarrhea or blood per rectum  O: Abdomen soft, minimal tenderness  Blood pressure 128/64, pulse 80, temperature 36.3 °C (97.3 °F), resp. rate 17, height 1.753 m (5' 9\"), weight 69 kg (152 lb 1.9 oz), SpO2 94 %.    Intake/Output Summary (Last 24 hours) at 02/18/17 1144  Last data filed at 02/18/17 0800   Gross per 24 hour   Intake    960 ml   Output    800 ml   Net    160 ml     Recent Labs      02/16/17   1021  02/17/17   0758  02/18/17   0245   WBC  19.7*  14.7*  14.5*   RBC  3.81*  3.52*  3.94*   HEMOGLOBIN  12.8*  11.5*  13.1*   HEMATOCRIT  37.2*  34.9*  39.5*   MCV  97.6  99.1*  100.3*   MCH  33.6*  32.7  33.2*   MCHC  34.4  33.0*  33.2*   RDW  49.6  50.4*  50.4*   PLATELETCT  173  137*  158*   MPV  9.8  10.4  10.2     Recent Labs      02/16/17   1021  02/17/17   0758  02/18/17   0245   SODIUM  133*  132*  133*   POTASSIUM  4.4  4.1  4.0   CHLORIDE  106  103  103   CO2  19*  23  20   GLUCOSE  152*  111*  107*   BUN  15  12  9   CREATININE  1.32  1.34  1.31   CALCIUM  8.7  8.3*  8.8     Recent Labs      02/16/17   1021  02/17/17   0758  02/18/17   0245   SODIUM  133*  132*  133*   POTASSIUM  4.4  4.1  4.0   CHLORIDE  106  103  103   CO2  19*  23  20   GLUCOSE  152*  111*  107*   BUN  15  12  9               Current Facility-Administered Medications   Medication Dose   • budesonide-formoterol (SYMBICORT) 160-4.5 MCG/ACT inhaler 2 Puff  2 Puff   • aspirin EC (ECOTRIN) tablet 81 mg  81 mg   • atorvastatin (LIPITOR) tablet 80 mg  80 mg   • diltiazem (CARDIZEM) tablet 60 mg  60 mg   • pyridoxine (VITAMIN B-6) tablet 100 mg  100 mg   • tiotropium (SPIRIVA) 18 MCG inhalation capsule 1 Cap  1 Cap   • Respiratory Care per Protocol     • dextrose 5 % and 0.45 % NaCl with KCl 20 mEq     • acetaminophen (TYLENOL) tablet 1,000 mg  1,000 mg   • oxycodone immediate-release (ROXICODONE) tablet 5 mg  5 mg   • ondansetron (ZOFRAN) syringe/vial " injection 4 mg  4 mg   • morphine (pf) 4 mg/ml injection 1-4 mg  1-4 mg       A:  Active Hospital Problems    Diagnosis   • Abdominal aneurysm (CMS-HCC) [I71.4]       P: S/P Endovascular AAA repair   Probable mild ischemic colitis   Hopefully home in am   Increase PO intake  Ambulate

## 2017-02-19 VITALS
TEMPERATURE: 97.3 F | HEIGHT: 69 IN | BODY MASS INDEX: 22.53 KG/M2 | RESPIRATION RATE: 17 BRPM | WEIGHT: 152.12 LBS | OXYGEN SATURATION: 96 % | DIASTOLIC BLOOD PRESSURE: 69 MMHG | SYSTOLIC BLOOD PRESSURE: 127 MMHG | HEART RATE: 64 BPM

## 2017-02-19 PROCEDURE — A9270 NON-COVERED ITEM OR SERVICE: HCPCS | Performed by: NURSE PRACTITIONER

## 2017-02-19 PROCEDURE — 700101 HCHG RX REV CODE 250: Performed by: NURSE PRACTITIONER

## 2017-02-19 PROCEDURE — 700102 HCHG RX REV CODE 250 W/ 637 OVERRIDE(OP): Performed by: NURSE PRACTITIONER

## 2017-02-19 RX ADMIN — DILTIAZEM HYDROCHLORIDE 60 MG: 60 TABLET, FILM COATED ORAL at 09:46

## 2017-02-19 RX ADMIN — ASPIRIN 81 MG: 81 TABLET ORAL at 09:47

## 2017-02-19 RX ADMIN — POTASSIUM CHLORIDE, DEXTROSE MONOHYDRATE AND SODIUM CHLORIDE: 150; 5; 450 INJECTION, SOLUTION INTRAVENOUS at 05:24

## 2017-02-19 RX ADMIN — Medication 100 MG: at 09:47

## 2017-02-19 RX ADMIN — ATORVASTATIN CALCIUM 80 MG: 40 TABLET, FILM COATED ORAL at 09:47

## 2017-02-19 RX ADMIN — TIOTROPIUM BROMIDE 1 CAPSULE: 18 CAPSULE ORAL; RESPIRATORY (INHALATION) at 09:47

## 2017-02-19 RX ADMIN — BUDESONIDE AND FORMOTEROL FUMARATE DIHYDRATE 2 PUFF: 160; 4.5 AEROSOL RESPIRATORY (INHALATION) at 09:47

## 2017-02-19 ASSESSMENT — PAIN SCALES - GENERAL
PAINLEVEL_OUTOF10: 0

## 2017-02-19 NOTE — PROGRESS NOTES
Patient is alert and oriented to person, place, time, and situation.  Ambulates with a standby assist.  Bed in lowest position, non slip socks are on, call light is within reach.  Patient has adequate intake and output.

## 2017-02-19 NOTE — CARE PLAN
Problem: Safety  Goal: Will remain free from falls  Outcome: PROGRESSING AS EXPECTED  Patient resting in bed with call light in reach and bed in lowest position.      Problem: Pain Management  Goal: Pain level will decrease to patient’s comfort goal  Outcome: PROGRESSING AS EXPECTED  Patient advised to request prn pain medication as needed for pain management.

## 2017-02-19 NOTE — PROGRESS NOTES
Discharging Patient home per physician order.  Discharged with wife.  Demonstrated understanding of discharge instructions, follow up appointments, home medications, prescriptions, home care for surgical wound, and nursing care instructions for AAA repair, pain medication, and IS.  Ambulating without assistance, voiding without difficulty, pain well controlled, tolerating oral medications, oxygen saturation greater than 90% , tolerating diet.   Educational handouts given and discussed.  Discussed smoking cessation, constipation, and pain medication safety. Verbalized understanding of discharge instructions and educational handouts.  Patient able to state several reasons to return to the ED or seek medical attention. All questions answered.  Belongings, paper scripts, discharge paperwork, and IS with patient at time of discharge.

## 2017-02-19 NOTE — DISCHARGE INSTRUCTIONS
Discharge Instructions    Discharged to home by car with relative. Discharged via wheelchair, hospital escort: Yes.  Special equipment needed: Not Applicable    Be sure to schedule a follow-up appointment with your primary care doctor or any specialists as instructed.     Discharge Plan:   Smoking Cessation Offered: Patient Refused  Influenza Vaccine Indication: Patient Refuses    I understand that a diet low in cholesterol, fat, and sodium is recommended for good health. Unless I have been given specific instructions below for another diet, I accept this instruction as my diet prescription.   Other diet: Regular    Special Instructions: None    · Is patient discharged on Warfarin / Coumadin?   No     · Is patient Post Blood Transfusion?  No    Remove bandage after 48 hours   May shower after bandage removed    Abdominal Aortic Aneurysm Open Repair, Care After  Refer to this sheet in the next few weeks. These instructions provide you with information on caring for yourself after your procedure. Your health care provider may also give you more specific instructions. Your treatment has been planned according to current medical practices, but problems sometimes occur. Call your health care provider if you have any problems or questions after your procedure.   WHAT TO EXPECT AFTER THE PROCEDURE   After your procedure, it is typical to have the following sensations:  · Pain at the incision site. Pain-relieving medicine will be given to control this.  · Increased tiredness. It can take up to 3 months before you resume all your normal activities.  HOME CARE INSTRUCTIONS   · Get plenty of rest, but move around frequently for short periods or take short walks as directed by your health care provider. Gradually increase the distance you walk.    · Keep the incision area clean and dry. Remove or change bandages (dressings) only as directed by your health care provider. You may have skin adhesive strips over the incision area.  Do not take the strips off. They will fall off on their own.    · Take showers once your health care provider approves. Until then, only take sponge baths. Pat incisions dry. Do not rub incisions with a washcloth or towel. Do not take tub baths or go swimming until your health care provider approves.    · Check your incision area every day for increased pain, swelling, redness, or fluid leaking from the incision. These may be signs of an infection.    · Only take over-the-counter or prescription medicines as directed by your health care provider.    · Limit activities as directed by your health care provider. Avoid strenuous activity and heavy lifting for 6-8 weeks.    · Do not drive until your health care provider approves.    · Drink enough fluids to keep your urine clear or pale yellow.    · Make any lifestyle changes recommended by your health care provider. This may include:    · Quitting smoking.    · Managing your blood pressure.    · Reducing stress.    · Eating healthy foods that are good for your heart.    · Getting regular exercise.    · Follow up with your health care provider as directed.     SEEK MEDICAL CARE IF:   1. You have increasing pain.    2. You have a fever.    3. You have chills.   4. You develop redness, swelling, increased pain, or drainage in the incision area.    5. You notice a bad smell coming from the incision area or dressing.     6. You notice that the edges of the incision are not staying together after the stitches or staples have been removed.    7. You have persistent nausea or vomiting.  8. You develop a rash.   SEEK IMMEDIATE MEDICAL CARE IF:   · You have dizziness or fainting while standing.    · You have difficulty breathing.      This information is not intended to replace advice given to you by your health care provider. Make sure you discuss any questions you have with your health care provider.    Pain Medicine Instructions  HOW CAN PAIN MEDICINE AFFECT ME?  You were  prescribed pain medicine. This medicine may:  · Make you tired or sleepy.  · Affect how well you can:  · Drive  · Do certain activities.  Pain medicine may not make all of your pain go away. You should be comfortable enough to:  · Move.  · Breathe.  · Take care of yourself.  HOW OFTEN SHOULD I TAKE PAIN MEDICINE AND HOW MUCH SHOULD I TAKE?  9. Take pain medicine only as told by your doctor and only as needed for pain.  10. You do not need to take pain medicine if you are not having pain, unless your doctor tells you to do that.  11. You can take less than the prescribed dose if you find that less medicine helps your pain.  WHAT SHOULD I AVOID WHILE I AM TAKING PAIN MEDICINE?  Follow these instructions after you start taking pain medicine, while you are taking the medicine, and for 8 hours after you stop taking the medicine:  · Do not drive.  · Do not use machinery.  · Do not use power tools.  · Do not sign legal documents.  · Do not drink alcohol.  · Do not take sleeping pills.  · Do not take care of children by yourself.  · Do not do any activities that involve climbing or being in high places.  · Do not go into any body of water unless there is an adult nearby who can watch and help you. This includes:  · Lakes.  · Rivers.  · Oceans.  · Spas.  · Swimming pools.  HOW CAN I KEEP OTHERS SAFE WHILE I AM TAKING PAIN MEDICINE?  · Store your pain medicine as told by your doctor. Make sure that you keep it where children and pets cannot reach it.  · Do not share your pain medicine with anyone.  · Do not save any leftover pills. If you have any leftover pain medicine, get rid of it or destroy it as told by your doctor.  WHAT ELSE DO I NEED TO KNOW ABOUT TAKING PAIN MEDICINE?  · Use a poop (stool) softener if you have trouble pooping (constipation) because of your pain medicine. Eating more fruits and vegetables also helps with constipation.  · Write down the times when you take your pain medicine. Look at the times before  you take your next dose of medicine.  · If your pain is very bad, do not take more pills than told by your doctor. Call your doctor for help.  · Your pain medicine might have acetaminophen in it. Do not take any other acetaminophen while you are taking this medicine. An overdose of acetaminophen can do very bad damage to your liver. If you are taking any medicines in addition to your pain medicine, check the active ingredients on those medicines to see if acetaminophen is listed.  WHEN SHOULD I CALL MY DOCTOR?  · Your medicine is not helping the pain.  · You do either of these soon after you take the medicine:  · Throw up (vomit).  · Have watery poop (diarrhea).  · You have new pain in areas that did not hurt before.  · You have an allergic reaction to your medicine. This may include:  · Feeling itchy.  · Swelling.  · Feeling dizzy.  · Getting a new rash.  WHEN SHOULD I CALL 911 OR GO TO THE EMERGENCY ROOM?  · You feel dizzy or you faint.  · You feel very confused.  · You throw up again and again.  · Your skin or lips turn pale or bluish in color.  · You are:  · Short of breath.  · Breathing much more slowly than usual.  · You have a very bad allergic reaction to your medicine. This includes:  · Developing a swollen tongue.  · Having trouble breathing.     This information is not intended to replace advice given to you by your health care provider. Make sure you discuss any questions you have with your health care provider.     Document Released: 06/05/2009 Document Revised: 05/03/2016 Document Reviewed: 10/22/2015  [employer] Interactive Patient Education ©2016 [employer]    Incentive Spirometer  An incentive spirometer is a tool that can help keep your lungs clear and active. This tool measures how well you are filling your lungs with each breath. Taking long, deep breaths may help reverse or decrease the chance of developing breathing (pulmonary) problems (especially infection) following:  · Surgery of the chest  or abdomen.  · Surgery if you have a history of smoking or a lung problem.  · A long period of time when you are unable to move or be active.  BEFORE THE PROCEDURE   · If the spirometer includes an indicator to show your best effort, your nurse or respiratory therapist will set it to a desired goal.  · If possible, sit up straight or lean slightly forward. Try not to slouch.  · Hold the incentive spirometer in an upright position.  INSTRUCTIONS FOR USE   12. Sit on the edge of your bed if possible, or sit up as far as you can in bed or on a chair.  13. Hold the incentive spirometer in an upright position.  14. Breathe out normally.  15. Place the mouthpiece in your mouth and seal your lips tightly around it.  16. Breathe in slowly and as deeply as possible, raising the piston or the ball toward the top of the column.  17. Hold your breath for 3-5 seconds or for as long as possible. Allow the piston or ball to fall to the bottom of the column.  18. Remove the mouthpiece from your mouth and breathe out normally.  19. Rest for a few seconds and repeat Steps 1 through 7 at least 10 times every 1-2 hours when you are awake. Take your time and take a few normal breaths between deep breaths.  20. The spirometer may include an indicator to show your best effort. Use the indicator as a goal to work toward during each repetition.  21. After each set of 10 deep breaths, practice coughing to be sure your lungs are clear. If you have an incision (the cut made at the time of surgery), support your incision when coughing by placing a pillow or rolled-up towels firmly against it.  Once you are able to get out of bed, walk around indoors and cough well. You may stop using the incentive spirometer when instructed by your caregiver.   RISKS AND COMPLICATIONS  · Breathing too quickly may cause dizziness. At an extreme, this could cause you to pass out. Take your time so you do not get dizzy or light-headed.  · If you are in pain, you  may need to take or ask for pain medication before doing incentive spirometry. It is harder to take a deep breath if you are having pain.  AFTER USE  · Rest and breathe slowly and easily.  · It can be helpful to keep a log of your progress. Your caregiver can provide you with a simple table to help with this.  If you are using the spirometer at home, follow these instructions:  SEEK MEDICAL CARE IF:   · You are having difficultly using the spirometer.  · You have trouble using the spirometer as often as instructed.  · Your pain medication is not giving enough relief while using the spirometer.  · You develop fever of 100.5°F (38.1°C) or higher.  SEEK IMMEDIATE MEDICAL CARE IF:   · You cough up bloody sputum that had not been present before.  · You develop fever of 102°F (38.9°C) or greater.  · You develop worsening pain at or near the incision site.  MAKE SURE YOU:   · Understand these instructions.  · Will watch your condition.  · Will get help right away if you are not doing well or get worse.     This information is not intended to replace advice given to you by your health care provider. Make sure you discuss any questions you have with your health care provider.     Document Released: 04/29/2008 Document Revised: 01/08/2016 Document Reviewed: 07/27/2015  [employer] Interactive Patient Education ©2016 [employer]

## 2017-02-19 NOTE — PROGRESS NOTES
AO x 4, dressings changed to bilat groin incisions.  Well approximated with steri strips, no drainage, redness, or swelling noted.  New dry gauze and tegaderm applied.  Feet warm, strong pulses.  Denies need for pain medication.  PO intake improved this am, consumed about 50% of am meal tray.  Plan of care discussed, questions answered, verbalized understanding. Awaiting discharge ride.

## 2017-02-24 NOTE — DISCHARGE SUMMARY
ADMITTING DIAGNOSIS:  Abdominal aortic aneurysm.  DISCHARGE DIAGNOSIS:  Abdominal aortic aneurysm.  HOSPITAL COURSE:  Patient underwent an endovascular repair of an abdominal   aortic aneurysm on 02/15/2017.  The patient's postoperative course was   complicated by likely a mild bout of ischemic colitis with some abdominal pain   and some loose stools.  The patient had a steady improvement with respect to   those symptoms.  He had overall clinical improvement and the patient was   discharged home on 02/19/2017 with an appointment to see me in the office in 1   week.       ____________________________________     JANA BAXTER MD    [facility]    DD:  02/23/2017 13:47:45  DT:  02/23/2017 23:33:51    D#:  [id number]  Job#:  [id number]

## 2017-03-15 ENCOUNTER — TELEPHONE (OUTPATIENT)
Dept: VASCULAR LAB | Facility: MEDICAL CENTER | Age: 72
End: 2017-03-15

## 2017-03-15 DIAGNOSIS — I71.40 ABDOMINAL AORTIC ANEURYSM WITHOUT RUPTURE (HCC): ICD-10-CM

## 2017-04-07 ENCOUNTER — TELEPHONE (OUTPATIENT)
Dept: VASCULAR LAB | Facility: MEDICAL CENTER | Age: 72
End: 2017-04-07

## 2017-04-07 ENCOUNTER — HOSPITAL ENCOUNTER (OUTPATIENT)
Dept: RADIOLOGY | Facility: MEDICAL CENTER | Age: 72
End: 2017-04-07
Attending: NURSE PRACTITIONER
Payer: MEDICARE

## 2017-04-07 DIAGNOSIS — I71.40 ABDOMINAL AORTIC ANEURYSM WITHOUT RUPTURE (HCC): ICD-10-CM

## 2017-04-07 PROCEDURE — 700117 HCHG RX CONTRAST REV CODE 255: Performed by: NURSE PRACTITIONER

## 2017-04-07 PROCEDURE — 74175 CTA ABDOMEN W/CONTRAST: CPT

## 2017-04-07 RX ADMIN — IOHEXOL 100 ML: 350 INJECTION, SOLUTION INTRAVENOUS at 13:15

## 2017-04-08 NOTE — TELEPHONE ENCOUNTER
Patient s/p EVAR in feb.  Post procedure CTA demonstrates decrease in size of aneurysm sac and no endoleak.    Unless otherwise requested, we will defer medical management to cardiology - patient has appt to see them next week.    We will continue to partner in surveillance per institution protocol.  Anticipate next study in august (6 month f/u)    Michael Bloch, MD  Vascular Care    Cc:  MD JAIRO Johnson MD

## 2017-04-12 ENCOUNTER — OFFICE VISIT (OUTPATIENT)
Dept: CARDIOLOGY | Facility: MEDICAL CENTER | Age: 72
End: 2017-04-12
Payer: MEDICARE

## 2017-04-12 VITALS
BODY MASS INDEX: 22.36 KG/M2 | HEIGHT: 69 IN | OXYGEN SATURATION: 95 % | WEIGHT: 151 LBS | DIASTOLIC BLOOD PRESSURE: 66 MMHG | SYSTOLIC BLOOD PRESSURE: 124 MMHG | HEART RATE: 96 BPM

## 2017-04-12 DIAGNOSIS — I71.40 ABDOMINAL AORTIC ANEURYSM WITHOUT RUPTURE (HCC): ICD-10-CM

## 2017-04-12 DIAGNOSIS — Z72.0 TOBACCO ABUSE: ICD-10-CM

## 2017-04-12 DIAGNOSIS — F10.20 UNCOMPLICATED ALCOHOL DEPENDENCE (HCC): ICD-10-CM

## 2017-04-12 PROCEDURE — G8432 DEP SCR NOT DOC, RNG: HCPCS | Performed by: INTERNAL MEDICINE

## 2017-04-12 PROCEDURE — 3017F COLORECTAL CA SCREEN DOC REV: CPT | Mod: 8P | Performed by: INTERNAL MEDICINE

## 2017-04-12 PROCEDURE — 4040F PNEUMOC VAC/ADMIN/RCVD: CPT | Mod: 8P | Performed by: INTERNAL MEDICINE

## 2017-04-12 PROCEDURE — 99214 OFFICE O/P EST MOD 30 MIN: CPT | Performed by: INTERNAL MEDICINE

## 2017-04-12 PROCEDURE — 4004F PT TOBACCO SCREEN RCVD TLK: CPT | Mod: 8P | Performed by: INTERNAL MEDICINE

## 2017-04-12 PROCEDURE — G8420 CALC BMI NORM PARAMETERS: HCPCS | Performed by: INTERNAL MEDICINE

## 2017-04-12 PROCEDURE — 1101F PT FALLS ASSESS-DOCD LE1/YR: CPT | Mod: 8P | Performed by: INTERNAL MEDICINE

## 2017-04-12 ASSESSMENT — ENCOUNTER SYMPTOMS
BLOOD IN STOOL: 0
FALLS: 0
SHORTNESS OF BREATH: 0
ABDOMINAL PAIN: 0
BLURRED VISION: 0
CLAUDICATION: 0
EYE PAIN: 0
PND: 0
FEVER: 0
DOUBLE VISION: 0
SPEECH CHANGE: 0
PALPITATIONS: 0
BRUISES/BLEEDS EASILY: 0
ORTHOPNEA: 0
VOMITING: 0
DEPRESSION: 0
COUGH: 0
LOSS OF CONSCIOUSNESS: 0
NAUSEA: 0
EYE DISCHARGE: 0
SENSORY CHANGE: 0
DIZZINESS: 0
HALLUCINATIONS: 0
WEIGHT LOSS: 0
HEADACHES: 0
CHILLS: 0
MYALGIAS: 0

## 2017-04-12 NOTE — MR AVS SNAPSHOT
"        Khai Munoz   2017 2:30 PM   Office Visit   MRN: [id number]    Department:  [facility]   Dept Phone:  [phone]    Description:  Male : 1945   Provider:  James Davila M.D.           Reason for Visit     Follow-Up           Allergies as of 2017     Allergen Noted Reactions    Declomycin 2017       Reaction a long time ago      You were diagnosed with     Abdominal aortic aneurysm without rupture (CMS-HCC)   [626180]       Uncomplicated alcohol dependence (CMS-HCC)   [123490]       Tobacco abuse   [176973]         Vital Signs     Blood Pressure Pulse Height Weight Body Mass Index Oxygen Saturation    124/66 mmHg 96 1.753 m (5' 9.02\") 68.493 kg (151 lb) 22.29 kg/m2 95%    Smoking Status                   Current Every Day Smoker           Basic Information     Date Of Birth Sex Race Ethnicity Preferred Language    1945 Male White Non- English      Problem List              ICD-10-CM Priority Class Noted - Resolved    Abdominal aneurysm (CMS-HCC) I71.4   2/15/2017 - Present      Health Maintenance        Date Due Completion Dates    IMM DTaP/Tdap/Td Vaccine (1 - Tdap) 1963 ---    COLONOSCOPY 1994 ---    IMM ZOSTER VACCINE 2004 ---    IMM PNEUMOCOCCAL 65+ (ADULT) LOW/MEDIUM RISK SERIES (1 of 2 - PCV13) 2009 ---            Current Immunizations     No immunizations on file.      Below and/or attached are the medications your provider expects you to take. Review all of your home medications and newly ordered medications with your provider and/or pharmacist. Follow medication instructions as directed by your provider and/or pharmacist. Please keep your medication list with you and share with your provider. Update the information when medications are discontinued, doses are changed, or new medications (including over-the-counter products) are added; and carry medication information at all times in the event of emergency situations    " Allergies:  DECLOMYCIN - (reactions not documented)               Medications  Valid as of: April 12, 2017 -  2:47 PM    Generic Name Brand Name Tablet Size Instructions for use    Aspirin (Tab) aspirin 81 MG Take 81 mg by mouth every day.        Aspirin (Tablet Delayed Response) ECOTRIN 81 MG Take 81 mg by mouth every day.        Atorvastatin Calcium (Tab) LIPITOR 80 MG Take 80 mg by mouth every day.        Calcium Citrate   Take 1 Tab by mouth 2 Times a Day. (With Vitamin D)        Coenzyme Q10 (Cap) Coenzyme Q10 100 MG Take 100 mg by mouth every day.        Cranberry   Take 2 Tabs by mouth every day.        DiltiaZEM HCl (Tab) CARDIZEM 60 MG Take 1 Tab by mouth 2 Times a Day.        Fluticasone-Salmeterol (AEROSOL POWDER, BREATH ACTIVATED) ADVAIR DISKUS 250-50 MCG/DOSE Take 1 Puff by mouth 2 Times a Day.        Hydrocodone-Acetaminophen (Tab) NORCO 5-325 MG Take 1-2 Tabs by mouth every four hours as needed.        Pyridoxine HCl (Tab) pyridoxine 100 MG Take 100 mg by mouth every day. Vitamin B-6        Tiotropium Bromide Monohydrate (Cap) SPIRIVA HANDIHALER 18 MCG Inhale 1 Cap by mouth every day.        .                 Medicines prescribed today were sent to:     [facility]Y #[id number] - [facility] [address]    [address] [facility] [address]    Phone: [phone] Fax: [phone]    Open 24 Hours?: No      Medication refill instructions:       If your prescription bottle indicates you have medication refills left, it is not necessary to call your provider’s office. Please contact your pharmacy and they will refill your medication.    If your prescription bottle indicates you do not have any refills left, you may request refills at any time through one of the following ways: The online [employer] system (except Urgent Care), by calling your provider’s office, or by asking your pharmacy to contact your provider’s office with a refill request. Medication refills are processed only during  regular business hours and may not be available until the next business day. Your provider may request additional information or to have a follow-up visit with you prior to refilling your medication.   *Please Note: Medication refills are assigned a new Rx number when refilled electronically. Your pharmacy may indicate that no refills were authorized even though a new prescription for the same medication is available at the pharmacy. Please request the medicine by name with the pharmacy before contacting your provider for a refill.        Your To Do List     Future Labs/Procedures Complete By Expires    COMP METABOLIC PANEL  As directed 4/13/2018         MyChart Status: Patient Declined        Quit Tobacco Information     Do you want to quit using tobacco?    Quitting tobacco decreases risks of cancer, heart and lung disease, increases life expectancy, improves sense of taste and smell, and increases spending money, among other benefits.    If you are thinking about quitting, we can help.  • [facility] Quit Tobacco Program: [phone]  o Program occurs weekly for four weeks and includes pharmacist consultation on products to support quitting smoking or chewing tobacco. A provider referral is needed for pharmacist consultation.  • Tobacco Users Help Hotline: 1-477-QUIT-NOW (951-7009) or https://[place].quitlogix.org/  o Free, confidential telephone and online coaching for [place] residents. Sessions are designed on a schedule that is convenient for you. Eligible clients receive free nicotine replacement therapy.  • Nationally: www.smokefree.gov  o Information and professional assistance to support both immediate and long-term needs as you become, and remain, a non-smoker. Smokefree.gov allows you to choose the help that best fits your needs.

## 2017-04-12 NOTE — PROGRESS NOTES
Subjective:   Khai Munoz is a 71 y.o. male who presents today for cardiac follow up after his AAA surgery. Prior to his surgery, patient had relatively normal transthoracic echocardiogram and myocardial PET scan stress test.     In the interim, patient has been doing well without having any symptoms. Patient denies having chest pain, dyspnea, palpitation, presyncope, syncope episodes. Able to climb up at least 2 flights of stairs.    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[facility])    • Cancer (CMS-HCC) 2006     prostate   • High cholesterol    • Emphysema of lung (CMS-HCC)    • Asthma      as a child   • Chronic cough    • Breath shortness    • Psychiatric problem      anxiety and depression   • Dental disorder      dental implants, bottom dentures implanted, upper, click on   • Tinnitus      Past Surgical History   Procedure Laterality Date   • Other       brachy therapy and radiation for prostate ca   • Other       dental implants   • Cyst excision       scrotal   • Aaa with stent graft  2/15/2017     Procedure: AAA WITH STENT GRAFT FOR: ENDOVASCULAR REPAIR OF INFRARENAL ABDOMINAL AORTIC ANEURYSM USING TWO DOCKING LIMBS;  Surgeon: Calvin Paulino M.D.;  Location: SURGERY [facility];  Service:      Family History   Problem Relation Age of Onset   • Heart Disease Mother      History   Smoking status   • Current Every Day Smoker -- 0.50 packs/day for 50 years   • Types: Cigarettes   Smokeless tobacco   • Never Used     Allergies   Allergen Reactions   • Declomycin      Reaction a long time ago     Outpatient Encounter Prescriptions as of 4/12/2017   Medication Sig Dispense Refill   • SPIRIVA HANDIHALER 18 MCG Cap Inhale 1 Cap by mouth every day.  3   • ADVAIR DISKUS 250-50 MCG/DOSE AEROSOL POWDER, BREATH ACTIVATED Take 1 Puff by mouth 2 Times a Day.  0   • atorvastatin (LIPITOR) 80 MG tablet Take 80 mg by  "mouth every day.     • diltiazem (CARDIZEM) 60 MG Tab Take 1 Tab by mouth 2 Times a Day.  3   • Coenzyme Q10 (SM COENZYME Q-10) 100 MG Cap Take 100 mg by mouth every day.     • pyridoxine 100 MG tablet Take 100 mg by mouth every day. Vitamin B-6     • aspirin 81 MG tablet Take 81 mg by mouth every day.     • CRANBERRY CONCENTRATE PO Take 2 Tabs by mouth every day.     • CALCIUM CITRATE PO Take 1 Tab by mouth 2 Times a Day. (With Vitamin D)     • aspirin EC (ECOTRIN) 81 MG Tablet Delayed Response Take 81 mg by mouth every day.     • hydrocodone-acetaminophen (NORCO) 5-325 MG Tab per tablet Take 1-2 Tabs by mouth every four hours as needed. (Patient not taking: Reported on 4/12/2017) 30 Tab 0     No facility-administered encounter medications on file as of 4/12/2017.     Review of Systems   Constitutional: Negative for fever, chills, weight loss and malaise/fatigue.   HENT: Negative for ear discharge, ear pain, hearing loss and nosebleeds.    Eyes: Negative for blurred vision, double vision, pain and discharge.   Respiratory: Negative for cough and shortness of breath.    Cardiovascular: Negative for chest pain, palpitations, orthopnea, claudication, leg swelling and PND.   Gastrointestinal: Negative for nausea, vomiting, abdominal pain, blood in stool and melena.   Genitourinary: Negative for dysuria and hematuria.   Musculoskeletal: Negative for myalgias, joint pain and falls.   Skin: Negative for itching and rash.   Neurological: Negative for dizziness, sensory change, speech change, loss of consciousness and headaches.   Endo/Heme/Allergies: Negative for environmental allergies. Does not bruise/bleed easily.   Psychiatric/Behavioral: Negative for depression, suicidal ideas and hallucinations.        Objective:   /66 mmHg  Pulse 96  Ht 1.753 m (5' 9.02\")  Wt 68.493 kg (151 lb)  BMI 22.29 kg/m2  SpO2 95%    Physical Exam   Constitutional: He is oriented to person, place, and time. He appears " well-developed and well-nourished.   HENT:   Head: Normocephalic and atraumatic.   Eyes: EOM are normal.   Neck: Normal range of motion. No JVD present.   Cardiovascular: Normal rate, regular rhythm, normal heart sounds and intact distal pulses.  Exam reveals no gallop and no friction rub.    No murmur heard.  Bilateral femoral pulses are 2+, bilateral dorsalis pedis pulses are 2+, bilateral posterior tibialis pulses are 2+.   Pulmonary/Chest: No respiratory distress. He has no wheezes. He has no rales. He exhibits no tenderness.   Abdominal: Soft. Bowel sounds are normal. There is no tenderness. There is no rebound and no guarding.   The is no presence of abdominal bruits   Musculoskeletal: Normal range of motion.   Neurological: He is alert and oriented to person, place, and time.   Skin: Skin is warm and dry.   Psychiatric: He has a normal mood and affect.   Nursing note and vitals reviewed.      Assessment:     1. Abdominal aortic aneurysm without rupture (CMS-[facility])  COMP METABOLIC PANEL    LIPID PANEL   2. Uncomplicated alcohol dependence (CMS-[facility])     3. Tobacco abuse         Medical Decision Making:  Today's Assessment / Status / Plan:     Blood pressure is well controlled.  Cont current medications at current dose.     I will see patient back in clinic with lab tests and studies results in 12 months.    I thank you Dr. Fajardo for referring patient to our Cardiology Clinic today.

## 2017-04-12 NOTE — Clinical Note
[facility]   [address]  [place] [address]  Phone: [phone]  Fax: [phone]              Khai Munoz  1945    Encounter Date: 4/12/2017    James Davila M.D.          PROGRESS NOTE:  Subjective:   Khai Muonz is a 71 y.o. male who presents today for cardiac follow up after his AAA surgery. Prior to his surgery, patient had relatively normal transthoracic echocardiogram and myocardial PET scan stress test.     In the interim, patient has been doing well without having any symptoms. Patient denies having chest pain, dyspnea, palpitation, presyncope, syncope episodes. Able to climb up at least 2 flights of stairs.    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[facility])    • Cancer (CMS-HCC) 2006     prostate   • High cholesterol    • Emphysema of lung (CMS-HCC)    • Asthma      as a child   • Chronic cough    • Breath shortness    • Psychiatric problem      anxiety and depression   • Dental disorder      dental implants, bottom dentures implanted, upper, click on   • Tinnitus      Past Surgical History   Procedure Laterality Date   • Other       brachy therapy and radiation for prostate ca   • Other       dental implants   • Cyst excision       scrotal   • Aaa with stent graft  2/15/2017     Procedure: AAA WITH STENT GRAFT FOR: ENDOVASCULAR REPAIR OF INFRARENAL ABDOMINAL AORTIC ANEURYSM USING TWO DOCKING LIMBS;  Surgeon: Calvin Paulino M.D.;  Location: SURGERY [facility];  Service:      Family History   Problem Relation Age of Onset   • Heart Disease Mother      History   Smoking status   • Current Every Day Smoker -- 0.50 packs/day for 50 years   • Types: Cigarettes   Smokeless tobacco   • Never Used     Allergies   Allergen Reactions   • Declomycin      Reaction a long time ago     Outpatient Encounter Prescriptions as of  4/12/2017   Medication Sig Dispense Refill   • SPIRIVA HANDIHALER 18 MCG Cap Inhale 1 Cap by mouth every day.  3   • ADVAIR DISKUS 250-50 MCG/DOSE AEROSOL POWDER, BREATH ACTIVATED Take 1 Puff by mouth 2 Times a Day.  0   • atorvastatin (LIPITOR) 80 MG tablet Take 80 mg by mouth every day.     • diltiazem (CARDIZEM) 60 MG Tab Take 1 Tab by mouth 2 Times a Day.  3   • Coenzyme Q10 (SM COENZYME Q-10) 100 MG Cap Take 100 mg by mouth every day.     • pyridoxine 100 MG tablet Take 100 mg by mouth every day. Vitamin B-6     • aspirin 81 MG tablet Take 81 mg by mouth every day.     • CRANBERRY CONCENTRATE PO Take 2 Tabs by mouth every day.     • CALCIUM CITRATE PO Take 1 Tab by mouth 2 Times a Day. (With Vitamin D)     • aspirin EC (ECOTRIN) 81 MG Tablet Delayed Response Take 81 mg by mouth every day.     • hydrocodone-acetaminophen (NORCO) 5-325 MG Tab per tablet Take 1-2 Tabs by mouth every four hours as needed. (Patient not taking: Reported on 4/12/2017) 30 Tab 0     No facility-administered encounter medications on file as of 4/12/2017.     Review of Systems   Constitutional: Negative for fever, chills, weight loss and malaise/fatigue.   HENT: Negative for ear discharge, ear pain, hearing loss and nosebleeds.    Eyes: Negative for blurred vision, double vision, pain and discharge.   Respiratory: Negative for cough and shortness of breath.    Cardiovascular: Negative for chest pain, palpitations, orthopnea, claudication, leg swelling and PND.   Gastrointestinal: Negative for nausea, vomiting, abdominal pain, blood in stool and melena.   Genitourinary: Negative for dysuria and hematuria.   Musculoskeletal: Negative for myalgias, joint pain and falls.   Skin: Negative for itching and rash.   Neurological: Negative for dizziness, sensory change, speech change, loss of consciousness and headaches.   Endo/Heme/Allergies: Negative for environmental allergies. Does not bruise/bleed easily.   Psychiatric/Behavioral: Negative for  "depression, suicidal ideas and hallucinations.        Objective:   /66 mmHg  Pulse 96  Ht 1.753 m (5' 9.02\")  Wt 68.493 kg (151 lb)  BMI 22.29 kg/m2  SpO2 95%    Physical Exam   Constitutional: He is oriented to person, place, and time. He appears well-developed and well-nourished.   HENT:   Head: Normocephalic and atraumatic.   Eyes: EOM are normal.   Neck: Normal range of motion. No JVD present.   Cardiovascular: Normal rate, regular rhythm, normal heart sounds and intact distal pulses.  Exam reveals no gallop and no friction rub.    No murmur heard.  Bilateral femoral pulses are 2+, bilateral dorsalis pedis pulses are 2+, bilateral posterior tibialis pulses are 2+.   Pulmonary/Chest: No respiratory distress. He has no wheezes. He has no rales. He exhibits no tenderness.   Abdominal: Soft. Bowel sounds are normal. There is no tenderness. There is no rebound and no guarding.   The is no presence of abdominal bruits   Musculoskeletal: Normal range of motion.   Neurological: He is alert and oriented to person, place, and time.   Skin: Skin is warm and dry.   Psychiatric: He has a normal mood and affect.   Nursing note and vitals reviewed.      Assessment:     1. Abdominal aortic aneurysm without rupture (CMS-HCC)  COMP METABOLIC PANEL    LIPID PANEL   2. Uncomplicated alcohol dependence (CMS-HCC)     3. Tobacco abuse         Medical Decision Making:  Today's Assessment / Status / Plan:     Blood pressure is well controlled.  Cont current medications at current dose.     I will see patient back in clinic with lab tests and studies results in 12 months.    I thank you Dr. Fajardo for referring patient to our Cardiology Clinic today.        Baldo Fajardo M.D.  [address] [facility] [address]  VIA Facsimile: [phone]                   "

## 2017-04-13 ENCOUNTER — TELEPHONE (OUTPATIENT)
Dept: VASCULAR LAB | Facility: MEDICAL CENTER | Age: 72
End: 2017-04-13

## 2017-04-13 NOTE — TELEPHONE ENCOUNTER
Spoke with pt, let him know his 1mo CT scan shows a stable aneurysm repair with no evidence of endoleak.  Pt plans to f/u with Dr. Paulino in 6mo and Dr. Davila in 1 year.    Bing Vargas, [facility]

## 2017-04-19 ENCOUNTER — TELEPHONE (OUTPATIENT)
Dept: VASCULAR LAB | Facility: MEDICAL CENTER | Age: 72
End: 2017-04-19

## 2017-04-20 NOTE — TELEPHONE ENCOUNTER
Recent chart note from Dr. Watt reviewed as part of EVAR guideline.  Based on this note, Dr. Watt is recommending aortoiliac duplex rather than CTA for his next surveillance imaging. We will defer to that recommendation    Michael J. Bloch, MD  Vascular Care    CC: Calvin Paulino M.D.

## 2017-04-24 ENCOUNTER — TELEPHONE (OUTPATIENT)
Dept: VASCULAR LAB | Facility: MEDICAL CENTER | Age: 72
End: 2017-04-24

## 2017-07-25 ENCOUNTER — TELEPHONE (OUTPATIENT)
Dept: VASCULAR LAB | Facility: MEDICAL CENTER | Age: 72
End: 2017-07-25

## 2017-07-25 DIAGNOSIS — I71.40 ABDOMINAL ANEURYSM (HCC): ICD-10-CM

## 2017-08-03 ENCOUNTER — TELEPHONE (OUTPATIENT)
Dept: VASCULAR LAB | Facility: MEDICAL CENTER | Age: 72
End: 2017-08-03

## 2017-08-03 NOTE — TELEPHONE ENCOUNTER
Spoke to pt, reminding him that he is due for his aortoiliac duplex scan this month for surveillance post EVAR, as recommended by Dr. Calvin Paulino.  Faxed order to [facility], per pt's request.  He states he will call next week to schedule.      LILLY Montgomery  [place] [facility]

## 2017-08-16 ENCOUNTER — TELEPHONE (OUTPATIENT)
Dept: VASCULAR LAB | Facility: MEDICAL CENTER | Age: 72
End: 2017-08-16

## 2017-08-17 NOTE — TELEPHONE ENCOUNTER
Reviewed aortoiliac duplex report from [facility] in accordance with EVAR surveillance program - 6 months post procedure.    Report demonstrates a stable endograft without endoleak. Maximum diameter of aneurysm sac is 4.7 cm which appears decreased from his baseline    Anticipate repeat aortoiliac duplex in one year    Will defer medical management of cardiac metabolic risk factors to PCP unless patient is willing to make an appointment to come in to vascular care to establish with us    Michael J. Bloch, MD  Vascular Care    CC:  Calvin Fajardo

## 2017-08-31 ENCOUNTER — HOSPITAL ENCOUNTER (INPATIENT)
Facility: MEDICAL CENTER | Age: 72
LOS: 5 days | DRG: 246 | End: 2017-09-05
Attending: EMERGENCY MEDICINE | Admitting: INTERNAL MEDICINE
Payer: MEDICARE

## 2017-08-31 ENCOUNTER — APPOINTMENT (OUTPATIENT)
Dept: RADIOLOGY | Facility: MEDICAL CENTER | Age: 72
DRG: 246 | End: 2017-08-31
Attending: EMERGENCY MEDICINE
Payer: MEDICARE

## 2017-08-31 ENCOUNTER — APPOINTMENT (OUTPATIENT)
Dept: RADIOLOGY | Facility: MEDICAL CENTER | Age: 72
DRG: 246 | End: 2017-08-31
Attending: INTERNAL MEDICINE
Payer: MEDICARE

## 2017-08-31 ENCOUNTER — RESOLUTE PROFESSIONAL BILLING HOSPITAL PROF FEE (OUTPATIENT)
Dept: HOSPITALIST | Facility: MEDICAL CENTER | Age: 72
End: 2017-08-31
Payer: MEDICARE

## 2017-08-31 DIAGNOSIS — R07.9 CHEST PAIN, UNSPECIFIED TYPE: ICD-10-CM

## 2017-08-31 PROBLEM — J44.9 COPD (CHRONIC OBSTRUCTIVE PULMONARY DISEASE) (HCC): Status: ACTIVE | Noted: 2017-08-31

## 2017-08-31 PROBLEM — I21.4 NSTEMI (NON-ST ELEVATED MYOCARDIAL INFARCTION) (HCC): Status: ACTIVE | Noted: 2017-08-31

## 2017-08-31 PROBLEM — R11.0 NAUSEA: Status: ACTIVE | Noted: 2017-08-31

## 2017-08-31 PROBLEM — N17.9 AKI (ACUTE KIDNEY INJURY) (HCC): Status: ACTIVE | Noted: 2017-08-31

## 2017-08-31 PROBLEM — I71.9 AORTA ANEURYSM (HCC): Status: ACTIVE | Noted: 2017-08-31

## 2017-08-31 LAB
ALBUMIN SERPL BCP-MCNC: 3.9 G/DL (ref 3.2–4.9)
ALBUMIN/GLOB SERPL: 1.1 G/DL
ALP SERPL-CCNC: 20 U/L (ref 30–99)
ALT SERPL-CCNC: 23 U/L (ref 2–50)
ANION GAP SERPL CALC-SCNC: 9 MMOL/L (ref 0–11.9)
APTT PPP: 29.7 SEC (ref 24.7–36)
APTT PPP: 30.3 SEC (ref 24.7–36)
AST SERPL-CCNC: 71 U/L (ref 12–45)
BASOPHILS # BLD AUTO: 0.4 % (ref 0–1.8)
BASOPHILS # BLD: 0.05 K/UL (ref 0–0.12)
BILIRUB SERPL-MCNC: 0.7 MG/DL (ref 0.1–1.5)
BNP SERPL-MCNC: 683 PG/ML (ref 0–100)
BUN SERPL-MCNC: 14 MG/DL (ref 8–22)
CALCIUM SERPL-MCNC: 9.6 MG/DL (ref 8.5–10.5)
CHLORIDE SERPL-SCNC: 103 MMOL/L (ref 96–112)
CO2 SERPL-SCNC: 22 MMOL/L (ref 20–33)
CREAT SERPL-MCNC: 1.61 MG/DL (ref 0.5–1.4)
EKG IMPRESSION: NORMAL
EOSINOPHIL # BLD AUTO: 0.19 K/UL (ref 0–0.51)
EOSINOPHIL NFR BLD: 1.6 % (ref 0–6.9)
ERYTHROCYTE [DISTWIDTH] IN BLOOD BY AUTOMATED COUNT: 56.1 FL (ref 35.9–50)
GFR SERPL CREATININE-BSD FRML MDRD: 42 ML/MIN/1.73 M 2
GLOBULIN SER CALC-MCNC: 3.4 G/DL (ref 1.9–3.5)
GLUCOSE SERPL-MCNC: 111 MG/DL (ref 65–99)
HCT VFR BLD AUTO: 39.3 % (ref 42–52)
HGB BLD-MCNC: 13.7 G/DL (ref 14–18)
IMM GRANULOCYTES # BLD AUTO: 0.05 K/UL (ref 0–0.11)
IMM GRANULOCYTES NFR BLD AUTO: 0.4 % (ref 0–0.9)
INR PPP: 1.01 (ref 0.87–1.13)
LIPASE SERPL-CCNC: 10 U/L (ref 11–82)
LYMPHOCYTES # BLD AUTO: 1.77 K/UL (ref 1–4.8)
LYMPHOCYTES NFR BLD: 14.8 % (ref 22–41)
MCH RBC QN AUTO: 32.9 PG (ref 27–33)
MCHC RBC AUTO-ENTMCNC: 34.9 G/DL (ref 33.7–35.3)
MCV RBC AUTO: 94.5 FL (ref 81.4–97.8)
MONOCYTES # BLD AUTO: 0.88 K/UL (ref 0–0.85)
MONOCYTES NFR BLD AUTO: 7.4 % (ref 0–13.4)
NEUTROPHILS # BLD AUTO: 9.01 K/UL (ref 1.82–7.42)
NEUTROPHILS NFR BLD: 75.4 % (ref 44–72)
NRBC # BLD AUTO: 0 K/UL
NRBC BLD AUTO-RTO: 0 /100 WBC
PLATELET # BLD AUTO: 182 K/UL (ref 164–446)
PMV BLD AUTO: 10.9 FL (ref 9–12.9)
POTASSIUM SERPL-SCNC: 4 MMOL/L (ref 3.6–5.5)
PROT SERPL-MCNC: 7.3 G/DL (ref 6–8.2)
PROTHROMBIN TIME: 13.6 SEC (ref 12–14.6)
RBC # BLD AUTO: 4.16 M/UL (ref 4.7–6.1)
SODIUM SERPL-SCNC: 134 MMOL/L (ref 135–145)
TROPONIN I SERPL-MCNC: 11.35 NG/ML (ref 0–0.04)
TROPONIN I SERPL-MCNC: 14.39 NG/ML (ref 0–0.04)
WBC # BLD AUTO: 12 K/UL (ref 4.8–10.8)

## 2017-08-31 PROCEDURE — 80053 COMPREHEN METABOLIC PANEL: CPT

## 2017-08-31 PROCEDURE — A9270 NON-COVERED ITEM OR SERVICE: HCPCS | Performed by: INTERNAL MEDICINE

## 2017-08-31 PROCEDURE — 93005 ELECTROCARDIOGRAM TRACING: CPT | Performed by: INTERNAL MEDICINE

## 2017-08-31 PROCEDURE — 85730 THROMBOPLASTIN TIME PARTIAL: CPT

## 2017-08-31 PROCEDURE — 99285 EMERGENCY DEPT VISIT HI MDM: CPT

## 2017-08-31 PROCEDURE — 76775 US EXAM ABDO BACK WALL LIM: CPT

## 2017-08-31 PROCEDURE — 700102 HCHG RX REV CODE 250 W/ 637 OVERRIDE(OP): Performed by: INTERNAL MEDICINE

## 2017-08-31 PROCEDURE — 85025 COMPLETE CBC W/AUTO DIFF WBC: CPT

## 2017-08-31 PROCEDURE — A9270 NON-COVERED ITEM OR SERVICE: HCPCS

## 2017-08-31 PROCEDURE — 99223 1ST HOSP IP/OBS HIGH 75: CPT | Performed by: INTERNAL MEDICINE

## 2017-08-31 PROCEDURE — A9270 NON-COVERED ITEM OR SERVICE: HCPCS | Performed by: EMERGENCY MEDICINE

## 2017-08-31 PROCEDURE — 71010 DX-CHEST-LIMITED (1 VIEW): CPT

## 2017-08-31 PROCEDURE — 700105 HCHG RX REV CODE 258: Performed by: INTERNAL MEDICINE

## 2017-08-31 PROCEDURE — 93005 ELECTROCARDIOGRAM TRACING: CPT | Performed by: EMERGENCY MEDICINE

## 2017-08-31 PROCEDURE — 83880 ASSAY OF NATRIURETIC PEPTIDE: CPT

## 2017-08-31 PROCEDURE — 770020 HCHG ROOM/CARE - TELE (206)

## 2017-08-31 PROCEDURE — 700102 HCHG RX REV CODE 250 W/ 637 OVERRIDE(OP)

## 2017-08-31 PROCEDURE — 84484 ASSAY OF TROPONIN QUANT: CPT

## 2017-08-31 PROCEDURE — 700102 HCHG RX REV CODE 250 W/ 637 OVERRIDE(OP): Performed by: EMERGENCY MEDICINE

## 2017-08-31 PROCEDURE — 94760 N-INVAS EAR/PLS OXIMETRY 1: CPT

## 2017-08-31 PROCEDURE — 85610 PROTHROMBIN TIME: CPT

## 2017-08-31 PROCEDURE — 36415 COLL VENOUS BLD VENIPUNCTURE: CPT

## 2017-08-31 PROCEDURE — 83690 ASSAY OF LIPASE: CPT

## 2017-08-31 PROCEDURE — 304562 HCHG STAT O2 MASK/CANNULA

## 2017-08-31 PROCEDURE — 700111 HCHG RX REV CODE 636 W/ 250 OVERRIDE (IP): Performed by: INTERNAL MEDICINE

## 2017-08-31 RX ORDER — TAMSULOSIN HYDROCHLORIDE 0.4 MG/1
0.8 CAPSULE ORAL
COMMUNITY
End: 2019-06-26

## 2017-08-31 RX ORDER — METOPROLOL TARTRATE 1 MG/ML
5 INJECTION, SOLUTION INTRAVENOUS
Status: DISCONTINUED | OUTPATIENT
Start: 2017-08-31 | End: 2017-09-05 | Stop reason: HOSPADM

## 2017-08-31 RX ORDER — HEPARIN SODIUM 1000 [USP'U]/ML
6000 INJECTION, SOLUTION INTRAVENOUS; SUBCUTANEOUS ONCE
Status: COMPLETED | OUTPATIENT
Start: 2017-09-01 | End: 2017-09-01

## 2017-08-31 RX ORDER — ACETAMINOPHEN 325 MG/1
650 TABLET ORAL EVERY 6 HOURS PRN
Status: DISCONTINUED | OUTPATIENT
Start: 2017-08-31 | End: 2017-09-05 | Stop reason: HOSPADM

## 2017-08-31 RX ORDER — BISACODYL 10 MG
10 SUPPOSITORY, RECTAL RECTAL
Status: DISCONTINUED | OUTPATIENT
Start: 2017-08-31 | End: 2017-09-05 | Stop reason: HOSPADM

## 2017-08-31 RX ORDER — HEPARIN SODIUM 1000 [USP'U]/ML
3200 INJECTION, SOLUTION INTRAVENOUS; SUBCUTANEOUS PRN
Status: DISCONTINUED | OUTPATIENT
Start: 2017-08-31 | End: 2017-09-01

## 2017-08-31 RX ORDER — AMOXICILLIN 250 MG
2 CAPSULE ORAL 2 TIMES DAILY
Status: DISCONTINUED | OUTPATIENT
Start: 2017-08-31 | End: 2017-09-05 | Stop reason: HOSPADM

## 2017-08-31 RX ORDER — TIOTROPIUM BROMIDE 18 UG/1
1 CAPSULE ORAL; RESPIRATORY (INHALATION) DAILY
Status: DISCONTINUED | OUTPATIENT
Start: 2017-09-01 | End: 2017-09-04

## 2017-08-31 RX ORDER — MORPHINE SULFATE 4 MG/ML
2-4 INJECTION, SOLUTION INTRAMUSCULAR; INTRAVENOUS
Status: DISCONTINUED | OUTPATIENT
Start: 2017-08-31 | End: 2017-09-05 | Stop reason: HOSPADM

## 2017-08-31 RX ORDER — ATORVASTATIN CALCIUM 80 MG/1
80 TABLET, FILM COATED ORAL DAILY
Status: DISCONTINUED | OUTPATIENT
Start: 2017-09-01 | End: 2017-09-05 | Stop reason: HOSPADM

## 2017-08-31 RX ORDER — ROSUVASTATIN CALCIUM 20 MG/1
20 TABLET, COATED ORAL EVERY EVENING
Status: DISCONTINUED | OUTPATIENT
Start: 2017-08-31 | End: 2017-08-31

## 2017-08-31 RX ORDER — SODIUM CHLORIDE 9 MG/ML
INJECTION, SOLUTION INTRAVENOUS CONTINUOUS
Status: DISCONTINUED | OUTPATIENT
Start: 2017-08-31 | End: 2017-09-01 | Stop reason: ALTCHOICE

## 2017-08-31 RX ORDER — ASPIRIN 81 MG/1
324 TABLET, CHEWABLE ORAL ONCE
Status: COMPLETED | OUTPATIENT
Start: 2017-08-31 | End: 2017-08-31

## 2017-08-31 RX ORDER — TAMSULOSIN HYDROCHLORIDE 0.4 MG/1
0.8 CAPSULE ORAL
Status: DISCONTINUED | OUTPATIENT
Start: 2017-08-31 | End: 2017-09-05 | Stop reason: HOSPADM

## 2017-08-31 RX ORDER — POLYETHYLENE GLYCOL 3350 17 G/17G
1 POWDER, FOR SOLUTION ORAL
Status: DISCONTINUED | OUTPATIENT
Start: 2017-08-31 | End: 2017-09-05 | Stop reason: HOSPADM

## 2017-08-31 RX ORDER — BUDESONIDE AND FORMOTEROL FUMARATE DIHYDRATE 160; 4.5 UG/1; UG/1
2 AEROSOL RESPIRATORY (INHALATION) 2 TIMES DAILY
Status: DISCONTINUED | OUTPATIENT
Start: 2017-08-31 | End: 2017-09-05 | Stop reason: HOSPADM

## 2017-08-31 RX ADMIN — SODIUM CHLORIDE: 9 INJECTION, SOLUTION INTRAVENOUS at 22:14

## 2017-08-31 RX ADMIN — PIPERACILLIN SODIUM AND TAZOBACTAM SODIUM 3.38 G: 3; .375 INJECTION, POWDER, FOR SOLUTION INTRAVENOUS at 23:30

## 2017-08-31 RX ADMIN — TAMSULOSIN HYDROCHLORIDE 0.8 MG: 0.4 CAPSULE ORAL at 23:26

## 2017-08-31 RX ADMIN — NITROGLYCERIN 0.5 INCH: 20 OINTMENT TOPICAL at 23:25

## 2017-08-31 RX ADMIN — ASPIRIN 324 MG: 81 TABLET, CHEWABLE ORAL at 20:20

## 2017-08-31 RX ADMIN — DILTIAZEM HYDROCHLORIDE 60 MG: 30 TABLET, FILM COATED ORAL at 23:24

## 2017-08-31 ASSESSMENT — LIFESTYLE VARIABLES
EVER HAD A DRINK FIRST THING IN THE MORNING TO STEADY YOUR NERVES TO GET RID OF A HANGOVER: NO
EVER_SMOKED: YES
EVER_SMOKED: YES
HAVE YOU EVER FELT YOU SHOULD CUT DOWN ON YOUR DRINKING: NO
AVERAGE NUMBER OF DAYS PER WEEK YOU HAVE A DRINK CONTAINING ALCOHOL: 4
CONSUMPTION TOTAL: INCOMPLETE
TOTAL SCORE: 0
TOTAL SCORE: 0
HAVE PEOPLE ANNOYED YOU BY CRITICIZING YOUR DRINKING: NO
TOTAL SCORE: 0
EVER FELT BAD OR GUILTY ABOUT YOUR DRINKING: NO
ALCOHOL_USE: YES

## 2017-08-31 ASSESSMENT — ENCOUNTER SYMPTOMS
SPEECH CHANGE: 0
SENSORY CHANGE: 0
COUGH: 0
SHORTNESS OF BREATH: 0
WEAKNESS: 0
VOMITING: 1
INSOMNIA: 0
HEARTBURN: 0
NAUSEA: 1
CONSTIPATION: 0
CHILLS: 0
DIARRHEA: 0
DEPRESSION: 0
MYALGIAS: 0
CLAUDICATION: 0
ABDOMINAL PAIN: 0
FEVER: 0
NERVOUS/ANXIOUS: 0
DIZZINESS: 0
PHOTOPHOBIA: 0
BLURRED VISION: 0
HEADACHES: 0

## 2017-08-31 ASSESSMENT — PATIENT HEALTH QUESTIONNAIRE - PHQ9
1. LITTLE INTEREST OR PLEASURE IN DOING THINGS: NOT AT ALL
SUM OF ALL RESPONSES TO PHQ9 QUESTIONS 1 AND 2: 0
SUM OF ALL RESPONSES TO PHQ QUESTIONS 1-9: 0
2. FEELING DOWN, DEPRESSED, IRRITABLE, OR HOPELESS: NOT AT ALL

## 2017-08-31 ASSESSMENT — COPD QUESTIONNAIRES
DURING THE PAST 4 WEEKS HOW MUCH DID YOU FEEL SHORT OF BREATH: NONE/LITTLE OF THE TIME
HAVE YOU SMOKED AT LEAST 100 CIGARETTES IN YOUR ENTIRE LIFE: YES
DO YOU EVER COUGH UP ANY MUCUS OR PHLEGM?: YES, A FEW DAYS A WEEK OR MONTH
COPD SCREENING SCORE: 6

## 2017-09-01 ENCOUNTER — APPOINTMENT (OUTPATIENT)
Dept: RADIOLOGY | Facility: MEDICAL CENTER | Age: 72
DRG: 246 | End: 2017-09-01
Attending: INTERNAL MEDICINE
Payer: MEDICARE

## 2017-09-01 PROBLEM — Z72.0 TOBACCO ABUSE: Status: ACTIVE | Noted: 2017-09-01

## 2017-09-01 LAB
ACT BLD: 180 SEC (ref 74–137)
ACT BLD: 307 SEC (ref 74–137)
ALBUMIN SERPL BCP-MCNC: 3.3 G/DL (ref 3.2–4.9)
ALBUMIN/GLOB SERPL: 1.1 G/DL
ALP SERPL-CCNC: 18 U/L (ref 30–99)
ALT SERPL-CCNC: 21 U/L (ref 2–50)
ANION GAP SERPL CALC-SCNC: 12 MMOL/L (ref 0–11.9)
APTT PPP: 149.1 SEC (ref 24.7–36)
APTT PPP: 153.3 SEC (ref 24.7–36)
AST SERPL-CCNC: 78 U/L (ref 12–45)
BASE EXCESS BLDA CALC-SCNC: -5 MMOL/L (ref -4–3)
BASOPHILS # BLD AUTO: 0.5 % (ref 0–1.8)
BASOPHILS # BLD: 0.06 K/UL (ref 0–0.12)
BILIRUB SERPL-MCNC: 0.8 MG/DL (ref 0.1–1.5)
BODY TEMPERATURE: ABNORMAL DEGREES
BUN SERPL-MCNC: 17 MG/DL (ref 8–22)
CALCIUM SERPL-MCNC: 8.7 MG/DL (ref 8.5–10.5)
CHLORIDE SERPL-SCNC: 103 MMOL/L (ref 96–112)
CO2 BLDA-SCNC: 19 MMOL/L (ref 20–33)
CO2 SERPL-SCNC: 20 MMOL/L (ref 20–33)
CREAT SERPL-MCNC: 1.64 MG/DL (ref 0.5–1.4)
EKG IMPRESSION: NORMAL
EKG IMPRESSION: NORMAL
EOSINOPHIL # BLD AUTO: 0.31 K/UL (ref 0–0.51)
EOSINOPHIL NFR BLD: 2.6 % (ref 0–6.9)
ERYTHROCYTE [DISTWIDTH] IN BLOOD BY AUTOMATED COUNT: 55.6 FL (ref 35.9–50)
GFR SERPL CREATININE-BSD FRML MDRD: 41 ML/MIN/1.73 M 2
GLOBULIN SER CALC-MCNC: 3.1 G/DL (ref 1.9–3.5)
GLUCOSE SERPL-MCNC: 134 MG/DL (ref 65–99)
HCO3 BLDA-SCNC: 18.2 MMOL/L (ref 17–25)
HCT VFR BLD AUTO: 36.8 % (ref 42–52)
HGB BLD-MCNC: 12.5 G/DL (ref 14–18)
IMM GRANULOCYTES # BLD AUTO: 0.06 K/UL (ref 0–0.11)
IMM GRANULOCYTES NFR BLD AUTO: 0.5 % (ref 0–0.9)
LV EJECT FRACT  99904: 35
LV EJECT FRACT MOD 2C 99903: 33.97
LV EJECT FRACT MOD 4C 99902: 39.13
LV EJECT FRACT MOD BP 99901: 35.25
LYMPHOCYTES # BLD AUTO: 2.05 K/UL (ref 1–4.8)
LYMPHOCYTES NFR BLD: 17.4 % (ref 22–41)
MCH RBC QN AUTO: 31.6 PG (ref 27–33)
MCHC RBC AUTO-ENTMCNC: 34 G/DL (ref 33.7–35.3)
MCV RBC AUTO: 93.2 FL (ref 81.4–97.8)
MONOCYTES # BLD AUTO: 0.82 K/UL (ref 0–0.85)
MONOCYTES NFR BLD AUTO: 7 % (ref 0–13.4)
NEUTROPHILS # BLD AUTO: 8.49 K/UL (ref 1.82–7.42)
NEUTROPHILS NFR BLD: 72 % (ref 44–72)
NRBC # BLD AUTO: 0 K/UL
NRBC BLD AUTO-RTO: 0 /100 WBC
PCO2 BLDA: 27.1 MMHG (ref 26–37)
PCO2 TEMP ADJ BLDA: 26 MMHG (ref 26–37)
PH BLDA: 7.44 [PH] (ref 7.4–7.5)
PH TEMP ADJ BLDA: 7.45 [PH] (ref 7.4–7.5)
PLATELET # BLD AUTO: 164 K/UL (ref 164–446)
PMV BLD AUTO: 11 FL (ref 9–12.9)
PO2 BLDA: 74 MMHG (ref 64–87)
PO2 TEMP ADJ BLDA: 70 MMHG (ref 64–87)
POTASSIUM SERPL-SCNC: 3.9 MMOL/L (ref 3.6–5.5)
PROT SERPL-MCNC: 6.4 G/DL (ref 6–8.2)
RBC # BLD AUTO: 3.95 M/UL (ref 4.7–6.1)
SAO2 % BLDA: 96 % (ref 93–99)
SODIUM SERPL-SCNC: 135 MMOL/L (ref 135–145)
SPECIMEN DRAWN FROM PATIENT: ABNORMAL
TROPONIN I SERPL-MCNC: 14.1 NG/ML (ref 0–0.04)
WBC # BLD AUTO: 11.8 K/UL (ref 4.8–10.8)

## 2017-09-01 PROCEDURE — 85025 COMPLETE CBC W/AUTO DIFF WBC: CPT

## 2017-09-01 PROCEDURE — 700111 HCHG RX REV CODE 636 W/ 250 OVERRIDE (IP)

## 2017-09-01 PROCEDURE — 99152 MOD SED SAME PHYS/QHP 5/>YRS: CPT

## 2017-09-01 PROCEDURE — 93005 ELECTROCARDIOGRAM TRACING: CPT | Performed by: INTERNAL MEDICINE

## 2017-09-01 PROCEDURE — C1769 GUIDE WIRE: HCPCS

## 2017-09-01 PROCEDURE — 93010 ELECTROCARDIOGRAM REPORT: CPT | Mod: 77 | Performed by: INTERNAL MEDICINE

## 2017-09-01 PROCEDURE — C1874 STENT, COATED/COV W/DEL SYS: HCPCS

## 2017-09-01 PROCEDURE — 770022 HCHG ROOM/CARE - ICU (200)

## 2017-09-01 PROCEDURE — C9600 PERC DRUG-EL COR STENT SING: HCPCS | Mod: LD

## 2017-09-01 PROCEDURE — 93010 ELECTROCARDIOGRAM REPORT: CPT | Mod: 76 | Performed by: INTERNAL MEDICINE

## 2017-09-01 PROCEDURE — 307093 HCHG TR BAND RADIAL

## 2017-09-01 PROCEDURE — 4A023N7 MEASUREMENT OF CARDIAC SAMPLING AND PRESSURE, LEFT HEART, PERCUTANEOUS APPROACH: ICD-10-PCS | Performed by: INTERNAL MEDICINE

## 2017-09-01 PROCEDURE — 700117 HCHG RX CONTRAST REV CODE 255: Performed by: INTERNAL MEDICINE

## 2017-09-01 PROCEDURE — B2151ZZ FLUOROSCOPY OF LEFT HEART USING LOW OSMOLAR CONTRAST: ICD-10-PCS | Performed by: INTERNAL MEDICINE

## 2017-09-01 PROCEDURE — 700101 HCHG RX REV CODE 250

## 2017-09-01 PROCEDURE — C1887 CATHETER, GUIDING: HCPCS

## 2017-09-01 PROCEDURE — C1894 INTRO/SHEATH, NON-LASER: HCPCS

## 2017-09-01 PROCEDURE — 700111 HCHG RX REV CODE 636 W/ 250 OVERRIDE (IP): Performed by: INTERNAL MEDICINE

## 2017-09-01 PROCEDURE — 700102 HCHG RX REV CODE 250 W/ 637 OVERRIDE(OP): Performed by: INTERNAL MEDICINE

## 2017-09-01 PROCEDURE — B2111ZZ FLUOROSCOPY OF MULTIPLE CORONARY ARTERIES USING LOW OSMOLAR CONTRAST: ICD-10-PCS | Performed by: INTERNAL MEDICINE

## 2017-09-01 PROCEDURE — A9270 NON-COVERED ITEM OR SERVICE: HCPCS | Performed by: INTERNAL MEDICINE

## 2017-09-01 PROCEDURE — 93306 TTE W/DOPPLER COMPLETE: CPT

## 2017-09-01 PROCEDURE — 82803 BLOOD GASES ANY COMBINATION: CPT

## 2017-09-01 PROCEDURE — 85730 THROMBOPLASTIN TIME PARTIAL: CPT

## 2017-09-01 PROCEDURE — 700105 HCHG RX REV CODE 258: Performed by: INTERNAL MEDICINE

## 2017-09-01 PROCEDURE — 700102 HCHG RX REV CODE 250 W/ 637 OVERRIDE(OP): Performed by: NURSE PRACTITIONER

## 2017-09-01 PROCEDURE — 93306 TTE W/DOPPLER COMPLETE: CPT | Mod: 26 | Performed by: INTERNAL MEDICINE

## 2017-09-01 PROCEDURE — 304952 HCHG R 2 PADS

## 2017-09-01 PROCEDURE — 36600 WITHDRAWAL OF ARTERIAL BLOOD: CPT

## 2017-09-01 PROCEDURE — 99153 MOD SED SAME PHYS/QHP EA: CPT

## 2017-09-01 PROCEDURE — 700102 HCHG RX REV CODE 250 W/ 637 OVERRIDE(OP)

## 2017-09-01 PROCEDURE — 85347 COAGULATION TIME ACTIVATED: CPT

## 2017-09-01 PROCEDURE — A9270 NON-COVERED ITEM OR SERVICE: HCPCS | Performed by: NURSE PRACTITIONER

## 2017-09-01 PROCEDURE — A9270 NON-COVERED ITEM OR SERVICE: HCPCS

## 2017-09-01 PROCEDURE — 84484 ASSAY OF TROPONIN QUANT: CPT

## 2017-09-01 PROCEDURE — C9606 PERC D-E COR REVASC W AMI S: HCPCS | Mod: LD

## 2017-09-01 PROCEDURE — 027034Z DILATION OF CORONARY ARTERY, ONE ARTERY WITH DRUG-ELUTING INTRALUMINAL DEVICE, PERCUTANEOUS APPROACH: ICD-10-PCS | Performed by: INTERNAL MEDICINE

## 2017-09-01 PROCEDURE — 71010 DX-CHEST-PORTABLE (1 VIEW): CPT

## 2017-09-01 PROCEDURE — 80053 COMPREHEN METABOLIC PANEL: CPT

## 2017-09-01 PROCEDURE — 51798 US URINE CAPACITY MEASURE: CPT

## 2017-09-01 PROCEDURE — C1725 CATH, TRANSLUMIN NON-LASER: HCPCS

## 2017-09-01 PROCEDURE — 93458 L HRT ARTERY/VENTRICLE ANGIO: CPT

## 2017-09-01 PROCEDURE — 99233 SBSQ HOSP IP/OBS HIGH 50: CPT | Performed by: INTERNAL MEDICINE

## 2017-09-01 RX ORDER — LORAZEPAM 2 MG/ML
0.5 INJECTION INTRAMUSCULAR EVERY 4 HOURS PRN
Status: DISCONTINUED | OUTPATIENT
Start: 2017-09-01 | End: 2017-09-05 | Stop reason: HOSPADM

## 2017-09-01 RX ORDER — VERAPAMIL HYDROCHLORIDE 2.5 MG/ML
INJECTION, SOLUTION INTRAVENOUS
Status: COMPLETED
Start: 2017-09-01 | End: 2017-09-01

## 2017-09-01 RX ORDER — LORAZEPAM 1 MG/1
2 TABLET ORAL
Status: DISCONTINUED | OUTPATIENT
Start: 2017-09-01 | End: 2017-09-05 | Stop reason: HOSPADM

## 2017-09-01 RX ORDER — LORAZEPAM 2 MG/ML
2 INJECTION INTRAMUSCULAR
Status: DISCONTINUED | OUTPATIENT
Start: 2017-09-01 | End: 2017-09-05 | Stop reason: HOSPADM

## 2017-09-01 RX ORDER — LORAZEPAM 1 MG/1
3 TABLET ORAL
Status: DISCONTINUED | OUTPATIENT
Start: 2017-09-01 | End: 2017-09-05 | Stop reason: HOSPADM

## 2017-09-01 RX ORDER — LORAZEPAM 2 MG/ML
1 INJECTION INTRAMUSCULAR
Status: DISCONTINUED | OUTPATIENT
Start: 2017-09-01 | End: 2017-09-01

## 2017-09-01 RX ORDER — LORAZEPAM 1 MG/1
1 TABLET ORAL EVERY 4 HOURS PRN
Status: DISCONTINUED | OUTPATIENT
Start: 2017-09-01 | End: 2017-09-05 | Stop reason: HOSPADM

## 2017-09-01 RX ORDER — MIDAZOLAM HYDROCHLORIDE 1 MG/ML
INJECTION INTRAMUSCULAR; INTRAVENOUS
Status: COMPLETED
Start: 2017-09-01 | End: 2017-09-01

## 2017-09-01 RX ORDER — PRASUGREL 10 MG/1
10 TABLET, FILM COATED ORAL DAILY
Status: DISCONTINUED | OUTPATIENT
Start: 2017-09-02 | End: 2017-09-05 | Stop reason: HOSPADM

## 2017-09-01 RX ORDER — DEXAMETHASONE SODIUM PHOSPHATE 4 MG/ML
4 INJECTION, SOLUTION INTRA-ARTICULAR; INTRALESIONAL; INTRAMUSCULAR; INTRAVENOUS; SOFT TISSUE
Status: COMPLETED | OUTPATIENT
Start: 2017-09-01 | End: 2017-09-01

## 2017-09-01 RX ORDER — SODIUM CHLORIDE 9 MG/ML
INJECTION, SOLUTION INTRAVENOUS CONTINUOUS
Status: DISPENSED | OUTPATIENT
Start: 2017-09-01 | End: 2017-09-01

## 2017-09-01 RX ORDER — LORAZEPAM 2 MG/ML
1.5 INJECTION INTRAMUSCULAR
Status: DISCONTINUED | OUTPATIENT
Start: 2017-09-01 | End: 2017-09-05 | Stop reason: HOSPADM

## 2017-09-01 RX ORDER — DIPHENHYDRAMINE HYDROCHLORIDE 50 MG/ML
25 INJECTION INTRAMUSCULAR; INTRAVENOUS EVERY 6 HOURS PRN
Status: DISCONTINUED | OUTPATIENT
Start: 2017-09-01 | End: 2017-09-05 | Stop reason: HOSPADM

## 2017-09-01 RX ORDER — ONDANSETRON 2 MG/ML
4 INJECTION INTRAMUSCULAR; INTRAVENOUS EVERY 4 HOURS PRN
Status: DISCONTINUED | OUTPATIENT
Start: 2017-09-01 | End: 2017-09-05 | Stop reason: HOSPADM

## 2017-09-01 RX ORDER — LORAZEPAM 1 MG/1
0.5 TABLET ORAL EVERY 4 HOURS PRN
Status: DISCONTINUED | OUTPATIENT
Start: 2017-09-01 | End: 2017-09-05 | Stop reason: HOSPADM

## 2017-09-01 RX ORDER — NICOTINE 21 MG/24HR
21 PATCH, TRANSDERMAL 24 HOURS TRANSDERMAL DAILY
Status: DISCONTINUED | OUTPATIENT
Start: 2017-09-01 | End: 2017-09-04

## 2017-09-01 RX ORDER — LORAZEPAM 0.5 MG/1
0.5 TABLET ORAL EVERY 4 HOURS PRN
Status: DISCONTINUED | OUTPATIENT
Start: 2017-09-01 | End: 2017-09-01

## 2017-09-01 RX ORDER — LORAZEPAM 1 MG/1
1 TABLET ORAL EVERY 4 HOURS PRN
Status: DISCONTINUED | OUTPATIENT
Start: 2017-09-01 | End: 2017-09-01

## 2017-09-01 RX ORDER — SODIUM CHLORIDE 9 MG/ML
INJECTION, SOLUTION INTRAVENOUS CONTINUOUS
Status: DISCONTINUED | OUTPATIENT
Start: 2017-09-01 | End: 2017-09-01

## 2017-09-01 RX ORDER — LIDOCAINE HYDROCHLORIDE 20 MG/ML
INJECTION, SOLUTION INFILTRATION; PERINEURAL
Status: COMPLETED
Start: 2017-09-01 | End: 2017-09-01

## 2017-09-01 RX ORDER — LORAZEPAM 1 MG/1
4 TABLET ORAL
Status: DISCONTINUED | OUTPATIENT
Start: 2017-09-01 | End: 2017-09-01

## 2017-09-01 RX ORDER — LORAZEPAM 2 MG/ML
2 INJECTION INTRAMUSCULAR
Status: DISCONTINUED | OUTPATIENT
Start: 2017-09-01 | End: 2017-09-01

## 2017-09-01 RX ORDER — HEPARIN SODIUM,PORCINE 1000/ML
VIAL (ML) INJECTION
Status: COMPLETED
Start: 2017-09-01 | End: 2017-09-01

## 2017-09-01 RX ORDER — PRASUGREL 10 MG/1
TABLET, FILM COATED ORAL
Status: COMPLETED
Start: 2017-09-01 | End: 2017-09-01

## 2017-09-01 RX ORDER — LORAZEPAM 1 MG/1
4 TABLET ORAL
Status: DISCONTINUED | OUTPATIENT
Start: 2017-09-01 | End: 2017-09-05 | Stop reason: HOSPADM

## 2017-09-01 RX ORDER — PRASUGREL 10 MG/1
60 TABLET, FILM COATED ORAL ONCE
Status: COMPLETED | OUTPATIENT
Start: 2017-09-01 | End: 2017-09-01

## 2017-09-01 RX ORDER — LORAZEPAM 2 MG/ML
0.5 INJECTION INTRAMUSCULAR EVERY 4 HOURS PRN
Status: DISCONTINUED | OUTPATIENT
Start: 2017-09-01 | End: 2017-09-01

## 2017-09-01 RX ORDER — LORAZEPAM 1 MG/1
3 TABLET ORAL
Status: DISCONTINUED | OUTPATIENT
Start: 2017-09-01 | End: 2017-09-01

## 2017-09-01 RX ORDER — LORAZEPAM 2 MG/ML
1.5 INJECTION INTRAMUSCULAR
Status: DISCONTINUED | OUTPATIENT
Start: 2017-09-01 | End: 2017-09-01

## 2017-09-01 RX ORDER — LORAZEPAM 2 MG/ML
1 INJECTION INTRAMUSCULAR
Status: DISCONTINUED | OUTPATIENT
Start: 2017-09-01 | End: 2017-09-05 | Stop reason: HOSPADM

## 2017-09-01 RX ORDER — HALOPERIDOL 5 MG/ML
1 INJECTION INTRAMUSCULAR EVERY 6 HOURS PRN
Status: DISCONTINUED | OUTPATIENT
Start: 2017-09-01 | End: 2017-09-05 | Stop reason: HOSPADM

## 2017-09-01 RX ORDER — SCOLOPAMINE TRANSDERMAL SYSTEM 1 MG/1
1 PATCH, EXTENDED RELEASE TRANSDERMAL
Status: DISCONTINUED | OUTPATIENT
Start: 2017-09-01 | End: 2017-09-05 | Stop reason: HOSPADM

## 2017-09-01 RX ORDER — LORAZEPAM 1 MG/1
2 TABLET ORAL
Status: DISCONTINUED | OUTPATIENT
Start: 2017-09-01 | End: 2017-09-01

## 2017-09-01 RX ADMIN — ATORVASTATIN CALCIUM 80 MG: 80 TABLET, FILM COATED ORAL at 09:01

## 2017-09-01 RX ADMIN — SODIUM CHLORIDE: 9 INJECTION, SOLUTION INTRAVENOUS at 20:10

## 2017-09-01 RX ADMIN — METOPROLOL TARTRATE 25 MG: 25 TABLET, FILM COATED ORAL at 16:33

## 2017-09-01 RX ADMIN — NICOTINE 21 MG: 21 PATCH, EXTENDED RELEASE TRANSDERMAL at 20:27

## 2017-09-01 RX ADMIN — NITROGLYCERIN 0.5 INCH: 20 OINTMENT TOPICAL at 06:07

## 2017-09-01 RX ADMIN — VERAPAMIL HYDROCHLORIDE 5 MG: 2.5 INJECTION, SOLUTION INTRAVENOUS at 14:39

## 2017-09-01 RX ADMIN — HEPARIN SODIUM 1200 UNITS/HR: 5000 INJECTION, SOLUTION INTRAVENOUS at 00:04

## 2017-09-01 RX ADMIN — HEPARIN SODIUM 2000 UNITS: 200 INJECTION, SOLUTION INTRAVENOUS at 14:40

## 2017-09-01 RX ADMIN — HEPARIN SODIUM: 1000 INJECTION, SOLUTION INTRAVENOUS; SUBCUTANEOUS at 14:40

## 2017-09-01 RX ADMIN — PIPERACILLIN SODIUM AND TAZOBACTAM SODIUM 3.38 G: 3; .375 INJECTION, POWDER, FOR SOLUTION INTRAVENOUS at 06:07

## 2017-09-01 RX ADMIN — FENTANYL CITRATE 100 MCG: 50 INJECTION, SOLUTION INTRAMUSCULAR; INTRAVENOUS at 15:14

## 2017-09-01 RX ADMIN — MIDAZOLAM 2 MG: 1 INJECTION INTRAMUSCULAR; INTRAVENOUS at 15:09

## 2017-09-01 RX ADMIN — NITROGLYCERIN 10 ML: 20 INJECTION INTRAVENOUS at 14:39

## 2017-09-01 RX ADMIN — DILTIAZEM HYDROCHLORIDE 60 MG: 30 TABLET, FILM COATED ORAL at 09:01

## 2017-09-01 RX ADMIN — TIOTROPIUM BROMIDE 1 CAPSULE: 18 CAPSULE ORAL; RESPIRATORY (INHALATION) at 09:02

## 2017-09-01 RX ADMIN — BUDESONIDE AND FORMOTEROL FUMARATE DIHYDRATE 2 PUFF: 160; 4.5 AEROSOL RESPIRATORY (INHALATION) at 20:06

## 2017-09-01 RX ADMIN — ASPIRIN 81 MG: 81 TABLET ORAL at 16:33

## 2017-09-01 RX ADMIN — LORAZEPAM 1.5 MG: 2 INJECTION INTRAMUSCULAR at 19:54

## 2017-09-01 RX ADMIN — ASPIRIN 81 MG: 81 TABLET ORAL at 09:01

## 2017-09-01 RX ADMIN — PIPERACILLIN SODIUM AND TAZOBACTAM SODIUM 3.38 G: 3; .375 INJECTION, POWDER, FOR SOLUTION INTRAVENOUS at 16:38

## 2017-09-01 RX ADMIN — LORAZEPAM 1 MG: 2 INJECTION INTRAMUSCULAR at 21:11

## 2017-09-01 RX ADMIN — PRASUGREL 60 MG: 10 TABLET, FILM COATED ORAL at 15:36

## 2017-09-01 RX ADMIN — IOHEXOL 247 ML: 350 INJECTION, SOLUTION INTRAVENOUS at 15:47

## 2017-09-01 RX ADMIN — FENTANYL CITRATE 100 MCG: 50 INJECTION, SOLUTION INTRAMUSCULAR; INTRAVENOUS at 15:44

## 2017-09-01 RX ADMIN — SODIUM CHLORIDE: 9 INJECTION, SOLUTION INTRAVENOUS at 06:11

## 2017-09-01 RX ADMIN — BUDESONIDE AND FORMOTEROL FUMARATE DIHYDRATE 2 PUFF: 160; 4.5 AEROSOL RESPIRATORY (INHALATION) at 09:02

## 2017-09-01 RX ADMIN — Medication 60 MG: at 15:36

## 2017-09-01 RX ADMIN — LIDOCAINE HYDROCHLORIDE: 20 INJECTION, SOLUTION INFILTRATION; PERINEURAL at 14:39

## 2017-09-01 RX ADMIN — DEXAMETHASONE SODIUM PHOSPHATE 4 MG: 4 INJECTION, SOLUTION INTRAMUSCULAR; INTRAVENOUS at 17:12

## 2017-09-01 RX ADMIN — LORAZEPAM 2 MG: 1 TABLET ORAL at 18:43

## 2017-09-01 RX ADMIN — HEPARIN SODIUM 6000 UNITS: 1000 INJECTION, SOLUTION INTRAVENOUS; SUBCUTANEOUS at 00:04

## 2017-09-01 ASSESSMENT — LIFESTYLE VARIABLES
ORIENTATION AND CLOUDING OF SENSORIUM: ORIENTED AND CAN DO SERIAL ADDITIONS
NAUSEA AND VOMITING: NO NAUSEA AND NO VOMITING
ORIENTATION AND CLOUDING OF SENSORIUM: ORIENTED AND CAN DO SERIAL ADDITIONS
ANXIETY: NO ANXIETY (AT EASE)
ANXIETY: *
VISUAL DISTURBANCES: NOT PRESENT
TREMOR: NO TREMOR
HEADACHE, FULLNESS IN HEAD: NOT PRESENT
AUDITORY DISTURBANCES: NOT PRESENT
PAROXYSMAL SWEATS: NO SWEAT VISIBLE
TREMOR: *
TREMOR: NO TREMOR
NAUSEA AND VOMITING: NO NAUSEA AND NO VOMITING
HEADACHE, FULLNESS IN HEAD: NOT PRESENT
HEADACHE, FULLNESS IN HEAD: NOT PRESENT
PAROXYSMAL SWEATS: BARELY PERCEPTIBLE SWEATING. PALMS MOIST
TOTAL SCORE: 13
PAROXYSMAL SWEATS: *
AUDITORY DISTURBANCES: NOT PRESENT
ORIENTATION AND CLOUDING OF SENSORIUM: ORIENTED AND CAN DO SERIAL ADDITIONS
PAROXYSMAL SWEATS: *
AUDITORY DISTURBANCES: NOT PRESENT
NAUSEA AND VOMITING: NO NAUSEA AND NO VOMITING
ANXIETY: MODERATELY ANXIOUS OR GUARDED, SO ANXIETY IS INFERRED
TOTAL SCORE: 12
VISUAL DISTURBANCES: NOT PRESENT
HEADACHE, FULLNESS IN HEAD: NOT PRESENT
ANXIETY: *
AGITATION: *
TREMOR: *
AGITATION: MODERATELY FIDGETY AND RESTLESS
PAROXYSMAL SWEATS: BARELY PERCEPTIBLE SWEATING. PALMS MOIST
ORIENTATION AND CLOUDING OF SENSORIUM: ORIENTED AND CAN DO SERIAL ADDITIONS
TREMOR: NO TREMOR
AGITATION: *
AGITATION: NORMAL ACTIVITY
TOTAL SCORE: 17
ANXIETY: EQUIVALENT TO ACUTE PANIC STATES AS OCCUR IN SEVERE DELIRIUM OR ACUTE SCHIZOPHRENIC REACTIONS
TOTAL SCORE: 14
NAUSEA AND VOMITING: NO NAUSEA AND NO VOMITING
VISUAL DISTURBANCES: NOT PRESENT
AGITATION: *
VISUAL DISTURBANCES: NOT PRESENT
AUDITORY DISTURBANCES: NOT PRESENT
NAUSEA AND VOMITING: NO NAUSEA AND NO VOMITING

## 2017-09-01 ASSESSMENT — ENCOUNTER SYMPTOMS
DOUBLE VISION: 0
COUGH: 0
BACK PAIN: 0
ABDOMINAL PAIN: 1
SORE THROAT: 0
SHORTNESS OF BREATH: 0
FALLS: 0
BLURRED VISION: 0
VOMITING: 0
NAUSEA: 0
HEARTBURN: 0
WHEEZING: 0
PALPITATIONS: 0
BLOOD IN STOOL: 0
CHILLS: 0
FOCAL WEAKNESS: 0
MYALGIAS: 0
WEAKNESS: 0
HALLUCINATIONS: 0
DIARRHEA: 0
ORTHOPNEA: 0
LOSS OF CONSCIOUSNESS: 0
FEVER: 0
DEPRESSION: 0
DIZZINESS: 0
HEADACHES: 0
NERVOUS/ANXIOUS: 1

## 2017-09-01 ASSESSMENT — PAIN SCALES - GENERAL
PAINLEVEL_OUTOF10: 4
PAINLEVEL_OUTOF10: 0
PAINLEVEL_OUTOF10: 0

## 2017-09-01 NOTE — PROGRESS NOTES
[facility] Hospitalist Progress Note    Date of Service: 2017    Chief Complaint  71 y.o. male with a history of tobacco abuse, aortic aneurysm status post endograft in 2017, hypertension admitted 2017 with chest pain found to have an NSTEMI.    Interval Problem Update  2017: Pending cath today. Heparin drip    Consultants/Specialty  Dr. Ramos- cardiology    Disposition  Follow-up cardiac cath, blood pressure control.        Review of Systems   Constitutional: Negative for chills, fever and malaise/fatigue.   HENT: Negative for sore throat.    Respiratory: Negative for cough and shortness of breath.    Cardiovascular: Positive for chest pain. Negative for palpitations.   Gastrointestinal: Positive for abdominal pain (States hungry). Negative for blood in stool, diarrhea, heartburn, nausea and vomiting.   Genitourinary: Negative for dysuria and frequency.   Musculoskeletal: Negative for back pain and myalgias.   Neurological: Negative for dizziness, focal weakness, weakness and headaches.   Psychiatric/Behavioral: Negative for depression and hallucinations.   All other systems reviewed and are negative.     Physical Exam  Laboratory/Imaging   Hemodynamics  Temp (24hrs), Av.8 °C (98.2 °F), Min:36.3 °C (97.3 °F), Max:37.2 °C (98.9 °F)   Temperature: 36.3 °C (97.3 °F)  Pulse  Av.1  Min: 63  Max: 116 Heart Rate (Monitored): (!) 106  Blood Pressure : 109/71, NIBP: 122/75      Respiratory      Respiration: 18, Pulse Oximetry: 91 %, O2 Daily Delivery Respiratory : Silicone Nasal Cannula     Work Of Breathing / Effort: Mild  RUL Breath Sounds: Clear, RML Breath Sounds: Crackles, RLL Breath Sounds: Crackles, GISSELLE Breath Sounds: Clear, LLL Breath Sounds: Diminished    Fluids    Intake/Output Summary (Last 24 hours) at 17 1346  Last data filed at 17 0800   Gross per 24 hour   Intake            849.4 ml   Output              400 ml   Net            449.4 ml       Nutrition  Orders  Placed This Encounter   Procedures   • DIET NPO     Standing Status:   Standing     Number of Occurrences:   1     Order Specific Question:   Restrict to:     Answer:   Sips with Medications [3]     Physical Exam   Constitutional: He is oriented to person, place, and time. He appears well-developed and well-nourished. No distress.   HENT:   Head: Normocephalic.   Mouth/Throat: No oropharyngeal exudate.   Nasal cannula in place   Eyes: Conjunctivae are normal. Pupils are equal, round, and reactive to light.   Neck: Normal range of motion. No tracheal deviation present.   Cardiovascular: Normal rate and regular rhythm.    No murmur heard.  Pulmonary/Chest: Effort normal. No respiratory distress. He has wheezes. He exhibits no tenderness.   Abdominal: Soft. He exhibits no distension. There is no tenderness. There is no guarding.   Musculoskeletal: Normal range of motion. He exhibits no edema or tenderness.   Lymphadenopathy:     He has no cervical adenopathy.   Neurological: He is alert and oriented to person, place, and time. No cranial nerve deficit.   Skin: Skin is warm and dry. No rash noted.   Psychiatric: He has a normal mood and affect. His behavior is normal.   Blunt   Nursing note and vitals reviewed.      Recent Labs      08/31/17   1800  09/01/17   0211   WBC  12.0*  11.8*   RBC  4.16*  3.95*   HEMOGLOBIN  13.7*  12.5*   HEMATOCRIT  39.3*  36.8*   MCV  94.5  93.2   MCH  32.9  31.6   MCHC  34.9  34.0   RDW  56.1*  55.6*   PLATELETCT  182  164   MPV  10.9  11.0     Recent Labs      08/31/17   1800  09/01/17   0211   SODIUM  134*  135   POTASSIUM  4.0  3.9   CHLORIDE  103  103   CO2  22  20   GLUCOSE  111*  134*   BUN  14  17   CREATININE  1.61*  1.64*   CALCIUM  9.6  8.7     Recent Labs      08/31/17   1800  08/31/17   2220  09/01/17   0625  09/01/17   1216   APTT  30.3  29.7  149.1*  153.3*   INR  1.01   --    --    --      Recent Labs      08/31/17   1800   BNPBTYPENAT  683*              Assessment/Plan      * NSTEMI (non-ST elevated myocardial infarction) (CMS-HCC)   Assessment & Plan    Troponin 11, cardiology consulted - recommending heparin and ASA  Will go for cardiac cath today  Chest pain has improved, not resolved  Continue heparin, atorvastatin, diltiazem  Hold Ace due to acute kidney injury and anticipated contrast        COPD (chronic obstructive pulmonary disease) (CMS-HCC)   Assessment & Plan    He does not wear oxygen at home, does not complain of shortness of breath.   Resume home meds, Symbicort instead of Advair and Spiriva  RT protocol        Nausea   Assessment & Plan    Improved this morning Anti-emetics prn  Concern with aspiration pneumonia - empiric zosyn started on admission, right sided infiltrates on cxr  Encourage IS        TD (acute kidney injury) (CMS-HCC)   Assessment & Plan    Question due to volume depletion   -Gentle fluid resuscitation  -Monitor Cr Daily  -Watch UOP  -Hold nephrotoxins, though he will require contrast  -Consider Renal US if no improvement            Chest pain   Assessment & Plan    Due to STEMI.  Evaluation of aortic aneurysm, with CT of the aorta and ultrasound of the abdomen showed decreased size in previously seen aneurysm.  Continue heparin, aspirin, statin  Plan for cath        Aorta aneurysm (CMS-HCC)   Assessment & Plan    Status post repair in February 2017  Most recent imaging shows stable endograft without weak this was dictated by Dr Michael bloch earlier this month  CTA aorta negative for acute abnormality, ultrasound of the abdomen also showed decreased size of aneurysm            Reviewed items::  Labs reviewed, Medications reviewed and Radiology images reviewed  White catheter::  No White  DVT prophylaxis pharmacological::  Heparin

## 2017-09-01 NOTE — ASSESSMENT & PLAN NOTE
- Troponin peaked at 14.39 and then trended down  - s/p cardiac catheterization with stent to LAD  - Cardiology following, discussed with Dr. Jen Killian  - Most recent ECHO with EF 35%  - Continue Effient, atorvastatin, metoprolol, ASA

## 2017-09-01 NOTE — ED NOTES
Tech from Lab called with critical result of troponin 11.33 at 1840. Critical lab result read back to .   Dr. Wilde notified of critical lab result at 1840.  Critical lab result read back by Dr. Wilde.

## 2017-09-01 NOTE — H&P
Hospital Medicine History and Physical    Date of Service  8/31/2017    Chief Complaint  Chief Complaint   Patient presents with   • Chest Pain       History of Presenting Illness  71 y.o. male who presented 8/31/2017 with acute onset chest pain.  His son have significant troponin elevation of 11. Patient states he was sleeping and woke up with chest pain but he is not able to tell me if the chest pain woke him up. He said shortly after he woke up he also had an episode of emesis. He states earlier today was feeling fine. Does not remember eating anything out of the ordinary. Denies any fevers or chills. Patient is not the best historian. Some information is garnished from him other from his wife at bedside. He does have a history of a AAA repair that was done in February of this year, states this was an incidental finding he was not having any pain at that time. States that this is the 1st time is ever had chest pain like this. Been under the care of Dr. Davila sense the AAA repair as Dr. Davila was consulted for cardiac clearance. There is documentation in the chart from a Dr. Michael Bloch stating that the Endo repair was in good health without evidence of leak and the current dimension is 4.7 cm aneurysm. This was done early this month.      Primary Care Physician  Baldo Fajardo M.D.    Consultants  Cardiology-[facility]    Code Status  full    Review of Systems  Review of Systems   Constitutional: Negative for chills and fever.   HENT: Negative for congestion.    Eyes: Negative for blurred vision and photophobia.   Respiratory: Negative for cough and shortness of breath.    Cardiovascular: Positive for chest pain. Negative for claudication and leg swelling.   Gastrointestinal: Positive for nausea and vomiting. Negative for abdominal pain, constipation, diarrhea and heartburn.   Genitourinary: Negative for dysuria and hematuria.   Musculoskeletal: Negative for joint pain and myalgias.   Skin: Negative for itching and  rash.   Neurological: Negative for dizziness, sensory change, speech change, weakness and headaches.   Psychiatric/Behavioral: Negative for depression. The patient is not nervous/anxious and does not have insomnia.         Past Medical History  Past Medical History:   Diagnosis Date   • Cancer (CMS-HCC) 2006    prostate   • Arrhythmia     afib   • Asthma     as a child   • Breath shortness    • Chronic cough    • Dental disorder     dental implants, bottom dentures implanted, upper, click on   • Emphysema of lung (CMS-HCC)    • High cholesterol    • Psychiatric problem     anxiety and depression   • 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CMS-HCC)    • Tinnitus    • Urinary bladder disorder     history of UTI       Surgical History  Past Surgical History:   Procedure Laterality Date   • AAA WITH STENT GRAFT  2/15/2017    Procedure: AAA WITH STENT GRAFT FOR: ENDOVASCULAR REPAIR OF INFRARENAL ABDOMINAL AORTIC ANEURYSM USING TWO DOCKING LIMBS;  Surgeon: Calvin Paulino M.D.;  Location: SURGERY [facility];  Service:    • CYST EXCISION      scrotal   • OTHER      brachy therapy and radiation for prostate ca   • OTHER      dental implants       Medications  No current facility-administered medications on file prior to encounter.      Current Outpatient Prescriptions on File Prior to Encounter   Medication Sig Dispense Refill   • SPIRIVA HANDIHALER 18 MCG Cap Inhale 1 Cap by mouth every day.  3   • ADVAIR DISKUS 250-50 MCG/DOSE AEROSOL POWDER, BREATH ACTIVATED Take 1 Puff by mouth 2 Times a Day.  0   • atorvastatin (LIPITOR) 80 MG tablet Take 80 mg by mouth every day.     • diltiazem (CARDIZEM) 60 MG Tab Take 1 Tab by mouth 2 Times a Day.  3   • Coenzyme Q10 (SM COENZYME Q-10) 100 MG Cap Take 100 mg by mouth every day.     • pyridoxine 100 MG tablet Take 100 mg by mouth every day. Vitamin B-6     • aspirin 81 MG tablet Take 81 mg by mouth every day.     • CRANBERRY CONCENTRATE PO Take 2 Tabs by mouth every day.         Family  History  Family History   Problem Relation Age of Onset   • Heart Disease Mother        Social History  Social History   Substance Use Topics   • Smoking status: Current Every Day Smoker     Packs/day: 0.50     Years: 50.00     Types: Cigarettes   • Smokeless tobacco: Never Used   • Alcohol use Yes      Comment: 375ml per day fo [place]       Allergies  Allergies   Allergen Reactions   • Declomycin      Reaction a long time ago        Physical Exam  Laboratory   Hemodynamics  Temp (24hrs), Av °C (98.6 °F), Min:37 °C (98.6 °F), Max:37 °C (98.6 °F)   Temperature: 37 °C (98.6 °F)  Pulse  Av  Min: 95  Max: 108 Heart Rate (Monitored): 96  Blood Pressure : 141/88, NIBP: 132/92      Respiratory      Respiration: 15, Pulse Oximetry: 93 %             Physical Exam   Constitutional: He is oriented to person, place, and time. He appears well-developed and well-nourished.   HENT:   Head: Normocephalic and atraumatic.   Eyes: Conjunctivae are normal. No scleral icterus.   Neck: Neck supple. No JVD present.   Cardiovascular: Normal rate, regular rhythm, normal heart sounds and intact distal pulses.  Exam reveals no gallop and no friction rub.    No murmur heard.  Chest pain not reproducible with palpation   Pulmonary/Chest: Effort normal and breath sounds normal. No respiratory distress. He has no wheezes. He has no rales. He exhibits no tenderness.   Abdominal: Soft. Bowel sounds are normal. He exhibits no distension and no mass. There is no tenderness.   Musculoskeletal: He exhibits no edema or tenderness.   Neurological: He is alert and oriented to person, place, and time. No cranial nerve deficit.   Skin: Skin is warm and dry. No rash noted. He is not diaphoretic.   Psychiatric: He has a normal mood and affect. His behavior is normal.   Nursing note and vitals reviewed.      Recent Labs      17   1800   WBC  12.0*   RBC  4.16*   HEMOGLOBIN  13.7*   HEMATOCRIT  39.3*   MCV  94.5   MCH  32.9   MCHC  34.9    RDW  56.1*   PLATELETCT  182   MPV  10.9     Recent Labs      08/31/17   1800   SODIUM  134*   POTASSIUM  4.0   CHLORIDE  103   CO2  22   GLUCOSE  111*   BUN  14   CREATININE  1.61*   CALCIUM  9.6     Recent Labs      08/31/17   1800   ALTSGPT  23   ASTSGOT  71*   ALKPHOSPHAT  20*   TBILIRUBIN  0.7   LIPASE  10*   GLUCOSE  111*     Recent Labs      08/31/17   1800   APTT  30.3   INR  1.01     Recent Labs      08/31/17   1800   BNPBTYPENAT  683*         Lab Results   Component Value Date    TROPONINI 11.35 (H) 08/31/2017     Urinalysis:  No results found for: SPECGRAVITY, GLUCOSEUR, KETONES, NITRITE, WBCURINE, RBCURINE, BACTERIA, EPITHELCELL     Imaging  Chest x-ray: Asymmetric right diffuse interstitial infiltrates, may represent asymmetric pulmonary edema versus infectious process     CTA aorta: Exam currently pending    Assessment/Plan     I anticipate this patient will require at least two midnights for appropriate medical management, necessitating inpatient admission.    COPD (chronic obstructive pulmonary disease) (CMS-HCC)   Assessment & Plan    Resume home meds  RT protocol        Nausea   Assessment & Plan    Anti-emetics prn  Concern with aspiration pneumonia - empiric zosyn, right sided infiltrates on cxr          TD (acute kidney injury) (CMS-HCC)   Assessment & Plan    Gentle fluid resuscitation          Chest pain   Assessment & Plan    An STEMI versus possible aortic aneurysm, CT of the aorta pending  Cardiology consultation        NSTEMI (non-ST elevated myocardial infarction) (CMS-HCC)   Assessment & Plan    Troponin 11, cardiology consulted - recommending heparin and ASA - on hold until AAA evaluation that was repaired in 2/2017 by dr Paulino          Aorta aneurysm (CMS-HCC)   Assessment & Plan    Status post repair in February 2017  Most recent imaging shows stable endograft without weak this was dictated by Dr Michael bloch earlier this month  CTA aorta pending            VTE  prophylaxis:Heparin drip once aneurysm ruled out .

## 2017-09-01 NOTE — ASSESSMENT & PLAN NOTE
- Still on 4L, unable to wean off, not on O2 at home  - Already on symbicort, will add spiriva to medically optimize  - Greater than 3 minutes spent at bedside and tobacco cessation counseling, will start nicotine patch, patient is not yet in the precontemplation stages for quitting, we'll continue to encourage  - Continue RT protocol

## 2017-09-01 NOTE — ASSESSMENT & PLAN NOTE
- Improved but then with mild worsening overnight  - Reports good UOP  - Hold off on IVF and encourage PO (EF 35%)  - May need to hold nephrotoxic meds if worsens  - Monitor

## 2017-09-01 NOTE — PROGRESS NOTES
Cardiology Progress Note               Author: Troy Yung Date & Time created: 2017  1:39 PM     Interval History:  71 years old male who presented to the ER with substernal burning chest pain. He has a history of [facility] vascular abdominal aortic repair in 2017 by Dr. Watt, hypertension, emphysema, anxiety, and depression.    Consultation:  Non-ST elevation myocardial infarction    17: anxious about the cardiac catheterization  /71  HR 90    Labs reviewed troponin peaked 14.39      Review of Systems   Constitutional: Negative for malaise/fatigue.   Eyes: Negative for blurred vision and double vision.   Respiratory: Negative for cough, shortness of breath and wheezing.    Cardiovascular: Negative for chest pain, palpitations, orthopnea and leg swelling.   Musculoskeletal: Negative for falls.   Neurological: Negative for dizziness, focal weakness, loss of consciousness, weakness and headaches.   Psychiatric/Behavioral: The patient is nervous/anxious.    All other systems reviewed and are negative.      Physical Exam   Constitutional: He is oriented to person, place, and time. He appears well-developed and well-nourished.   HENT:   Head: Normocephalic.   Neck: Normal range of motion. No JVD present.   Cardiovascular: Normal rate, regular rhythm and normal heart sounds.    No murmur heard.  Pulmonary/Chest: Effort normal and breath sounds normal. No respiratory distress. He has no wheezes.   Abdominal: Bowel sounds are normal.   Musculoskeletal: He exhibits no edema or tenderness.   Neurological: He is alert and oriented to person, place, and time.   Skin: Skin is warm and dry.   Psychiatric: His behavior is normal. Judgment normal.   Nursing note and vitals reviewed.      Hemodynamics:  Temp (24hrs), Av.8 °C (98.2 °F), Min:36.3 °C (97.3 °F), Max:37.2 °C (98.9 °F)  Temperature: 36.3 °C (97.3 °F)  Pulse  Av.1  Min: 63  Max: 116Heart Rate (Monitored): (!) 106  Blood Pressure : 109/71,  NIBP: 122/75     Respiratory:    Respiration: 18, Pulse Oximetry: 91 %, O2 Daily Delivery Respiratory : Silicone Nasal Cannula     Work Of Breathing / Effort: Mild  RUL Breath Sounds: Clear, RML Breath Sounds: Crackles, RLL Breath Sounds: Crackles, GISSELLE Breath Sounds: Clear, LLL Breath Sounds: Diminished  Fluids:  Date 09/01/17 0700 - 09/02/17 0659   Shift 5323-9270 4535-6913 8029-7112 24 Hour Total   I  N  T  A  K  E   Shift Total       O  U  T  P  U  T   Urine 400   400    Shift Total 400   400   Weight (kg) 71.8 71.8 71.8 71.8       Weight: 71.8 kg (158 lb 4.6 oz)  GI/Nutrition:  Orders Placed This Encounter   Procedures   • DIET NPO     Standing Status:   Standing     Number of Occurrences:   1     Order Specific Question:   Restrict to:     Answer:   Sips with Medications [3]     Lab Results:  Recent Labs      08/31/17 1800 09/01/17 0211   WBC  12.0*  11.8*   RBC  4.16*  3.95*   HEMOGLOBIN  13.7*  12.5*   HEMATOCRIT  39.3*  36.8*   MCV  94.5  93.2   MCH  32.9  31.6   MCHC  34.9  34.0   RDW  56.1*  55.6*   PLATELETCT  182  164   MPV  10.9  11.0     Recent Labs      08/31/17   1800  09/01/17   0211   SODIUM  134*  135   POTASSIUM  4.0  3.9   CHLORIDE  103  103   CO2  22  20   GLUCOSE  111*  134*   BUN  14  17   CREATININE  1.61*  1.64*   CALCIUM  9.6  8.7     Recent Labs      08/31/17   1800  08/31/17 2220 09/01/17   0625  09/01/17   1216   APTT  30.3  29.7  149.1*  153.3*   INR  1.01   --    --    --      Recent Labs      08/31/17   1800   BNPBTYPENAT  683*     Recent Labs      08/31/17   1800  08/31/17 2220 09/01/17   0211   TROPONINI  11.35*  14.39*  14.10*   BNPBTYPENAT  683*   --    --              Medical Decision Making, by Problem:  Active Hospital Problems    Diagnosis   • *NSTEMI (non-ST elevated myocardial infarction) (CMS-HCC) [I21.4]   • Aorta aneurysm (CMS-HCC) [I71.9]   • Chest pain [R07.9]   • TD (acute kidney injury) (CMS-HCC) [N17.9]   • Nausea [R11.0]   • COPD (chronic obstructive  pulmonary disease) (CMS-HCC) [J44.9]       Plan:  1. NSTEMI:  - troponin peaked at 14.39, now trending down  - cardiac catheterization today  - continue heparin gtt  - continue asa, statin, and diltiazem       Future Appointments  Date Time Provider Department Center   9/13/2017 10:45 AM James Davila M.D. [facility] None         Quality-Core Measures

## 2017-09-01 NOTE — CONSULTS
Reason of Consult: Non-ST elevation myocardial infarction    Consulting Physician: Dr. Wilde    HPI:  This is a 71-year-old gentleman with a history of recent endovascular abdominal aortic repair February 2017 by Dr. Calvin Watt which was unfortunately complicated by ischemic bowel which resolved prior to hospital discharge. He is followed by Dr. Antonio Davila in cardiology, and has a history of hypertension, emphysema anxiety and depression. He presents after noticing substernal burning chest pressure this morning at 5 AM. Subsequently this has resolved, he is unclear on the details of how long it lasted or what improved it. He also notes now that he has upper abdominal discomfort. CT aortography as planned by the emergency department. Troponin is markedly elevated at 11 and elevated BNP. He denies any shortness of breath PND or orthopnea. His EKG is unremarkable aside from borderline ST depression laterally.    Denies any other cardiovascular symptoms including shortness of breath, dyspnea on exertion, lightheadedness, syncope or presyncope, lower extremity edema, PND, orthopnea or palpitations.      Past Medical History:   Diagnosis Date   • Cancer (CMS-HCC) 2006    prostate   • Arrhythmia     afib   • Asthma     as a child   • Breath shortness    • Chronic cough    • Dental disorder     dental implants, bottom dentures implanted, upper, click on   • Emphysema of lung (CMS-HCC)    • High cholesterol    • Psychiatric problem     anxiety and depression   • 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CMS-HCC)    • Tinnitus    • Urinary bladder disorder     history of UTI       Social History     Social History   • Marital status:      Spouse name: N/A   • Number of children: N/A   • Years of education: N/A     Occupational History   • Not on file.     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      Current Outpatient Prescriptions on File Prior to Encounter   Medication Sig Dispense Refill   • SPIRIVA HANDIHALER 18 MCG Cap Inhale 1 Cap by mouth every day.  3   • ADVAIR DISKUS 250-50 MCG/DOSE AEROSOL POWDER, BREATH ACTIVATED Take 1 Puff by mouth 2 Times a Day.  0   • atorvastatin (LIPITOR) 80 MG tablet Take 80 mg by mouth every day.     • diltiazem (CARDIZEM) 60 MG Tab Take 1 Tab by mouth 2 Times a Day.  3   • Coenzyme Q10 ( COENZYME Q-10) 100 MG Cap Take 100 mg by mouth every day.     • pyridoxine 100 MG tablet Take 100 mg by mouth every day. Vitamin B-6     • aspirin 81 MG tablet Take 81 mg by mouth every day.     • CRANBERRY CONCENTRATE PO Take 2 Tabs by mouth every day.         Current Facility-Administered Medications   Medication Dose Frequency Provider Last Rate Last Dose   • [START ON 9/1/2017] aspirin EC (ECOTRIN) tablet 81 mg  81 mg DAILY THOMAS Kaiser.O.       • [START ON 9/1/2017] atorvastatin (LIPITOR) tablet 80 mg  80 mg DAILY THOMAS Kaiser.O.       • diltiazem (CARDIZEM) tablet 60 mg  60 mg BID THOMAS Kaiser.O.       • [START ON 9/1/2017] tiotropium (SPIRIVA) 18 MCG inhalation capsule 1 Cap  1 Cap DAILY THOMAS Kaiser.O.       • tamsulosin (FLOMAX) capsule 0.8 mg  0.8 mg QHS THOMAS Kaiser.O.       • senna-docusate (PERICOLACE or SENOKOT S) 8.6-50 MG per tablet 2 Tab  2 Tab BID SHIREEN KaiserO.        And   • polyethylene glycol/lytes (MIRALAX) PACKET 1 Packet  1 Packet QDAY PRN THOMAS Kaiser.O.        And   • magnesium hydroxide (MILK OF MAGNESIA) suspension 30 mL  30 mL QDAY PRN SHIREEN KaiserO.        And   • bisacodyl (DULCOLAX) suppository 10 mg  10 mg QDAY PRN Ai BROOKE  "JAILYN Hernandez       • Respiratory Care per Protocol   Continuous RT Ai Hernandez D.O.       • NS infusion   Continuous Ai Hernandez D.O.       • acetaminophen (TYLENOL) tablet 650 mg  650 mg Q6HRS PRN Ai Hernandez D.O.       • morphine (pf) 4 mg/ml injection 2-4 mg  2-4 mg Q5 MIN PRN Ai Hernandez D.O.       • Metoprolol Tartrate (LOPRESSOR) injection 5 mg  5 mg Q5 MIN PRN Ai Hernandez D.O.       • [START ON 9/1/2017] piperacillin-tazobactam (ZOSYN) 3.375 g in  mL IVPB  3.375 g Q6HRS Ai Hernandez D.O.       • budesonide-formoterol (SYMBICORT) 160-4.5 MCG/ACT inhaler 2 Puff  2 Puff BID James Reynolds, PharmD         Last reviewed on 8/31/2017  7:36 PM by Emani Hunter    Declmonica    Family History   Problem Relation Age of Onset   • Heart Disease Mother        ROS: As per HPI all other systems reviewed and negative     Physical Exam   Blood pressure 141/88, pulse (!) 108, temperature 37 °C (98.6 °F), resp. rate 15, height 1.753 m (5' 9\"), weight 68 kg (150 lb), SpO2 93 %.    Constitutional:Appears older than stated age. Flat affect. Appears well-developed.   HENT: Normocephalic and atraumatic. No scleral icterus.   Neck: No JVD present.   Cardiovascular: Normal rate. Exam reveals no gallop and no friction rub. No murmur heard.   Pulmonary/Chest: Right lower lobe bronchi   Abdominal: S/NT/ND BS+   Musculoskeletal:  Pulses present. No atrophy. Strength normal.  Extremities: Exhibits no edema. No clubbing or cyanosis.   Skin: Skin is warm and dry.   Neuro: Non-focal, CN 2-12 intact grossly    No intake or output data in the 24 hours ending 08/31/17 2113    Recent Labs      08/31/17   1800   WBC  12.0*   RBC  4.16*   HEMOGLOBIN  13.7*   HEMATOCRIT  39.3*   MCV  94.5   MCH  32.9   MCHC  34.9   RDW  56.1*   PLATELETCT  182   MPV  10.9     Recent Labs      08/31/17   1800   SODIUM  134*   POTASSIUM  4.0   CHLORIDE  103   CO2  22   GLUCOSE  111*   BUN  14   CREATININE  1.61*   CALCIUM  9.6 "     Recent Labs      08/31/17   1800   APTT  30.3   INR  1.01     Recent Labs      08/31/17   1800   BNPBTYPENAT  683*     Recent Labs      08/31/17   1800   TROPONINI  11.35*   BNPBTYPENAT  683*           EKG (8/31/2017 ):  I have personally reviewed the EKG this visit and discussed with the patient. It shows sinus tachycardia with borderline ST depressions anterolaterally. No ST elevation noted.    Imaging reviewed    Impressions:  1. Non-ST elevation myocardial infarction  2. Abdominal pain  3. Recent endovascular aortic aneurysm repair  4. Recent ischemic bowel  5. Leukocytosis  6. Acute renal insufficiency  7. Emphysema    Recommendations:  He had burning chest pain this morning which has resolved. He now has abdominal pain. He has a leukocytosis and a history of ischemic bowel. He also has acute renal insufficiency. Appropriately the emergency department physician is planning CT aortography to evaluate his aorta. As he is currently not having any chest pain and has acute kidney injury and no evidence of ST elevation on EKG recommended hydration and medical therapy followed by interval coronary angiography. Should he develop recurrent chest pain or become hemodynamically or electrically unstable he'll be taken urgently this evening for coronary angiography.    1. Aspirin  2. Weight-based heparin GTT  3. Nitroglycerin GTT should he develop chest pain  4. IV hydration  5. Evaluation and management of abdominal pain and leukocytosis with abnormal chest imaging  6. Statin, beta blocker, and up-titrate as tolerated. Recommend holding ACE inhibitor given acute renal insufficiency.    Thank you for this interesting consultation. It was my pleasure to see Khai Munoz today.

## 2017-09-01 NOTE — PROGRESS NOTES
"Informed by monitor that pt had 4 beats of V-tach. Pt claimed no changes in pain except \"My stomach hurts because I haven't eaten in 2 days.\" Heart sounds are clear to auscultation with no adventitious sounds. Pt has a wet cough with breath sounds crackly. Pt was encouraged to use incentive spirometer. Will continue to monitor heart sounds and respirations.  "

## 2017-09-01 NOTE — CARE PLAN
Problem: Safety  Goal: Will remain free from injury    Intervention: Provide assistance with mobility  Ambulates with steady gait.  Call light in reach at all times for assistance when needed.  Hourly rounding in place.  Bed in lowest position.

## 2017-09-01 NOTE — RESPIRATORY CARE
COPD EDUCATION by COPD CLINICAL EDUCATOR  9/1/2017 at 8:30 AM by Tiffanie Barcenas     Patient reviewed by COPD education team. Patient does not qualify for COPD program.

## 2017-09-01 NOTE — ED NOTES
"Chief Complaint   Patient presents with   • Chest Pain     /88   Pulse (!) 101   Temp 37 °C (98.6 °F)   Resp 16   Ht 1.753 m (5' 9\")   Wt 68 kg (150 lb)   BMI 22.15 kg/m²     BIBA from a doctor's office in [place] for CP since 0500 this morning. EKG negative. Hx AAA. Received ASA, nitro, zofran PTA    Chart up for eval.    "

## 2017-09-01 NOTE — ASSESSMENT & PLAN NOTE
Again smoking cessation strongly encouraged.  Patient aware of damages of smoking with risks of atheroscleorsis, cancer, and stroke/heart attack.

## 2017-09-01 NOTE — PROGRESS NOTES
MD Dr. Miner notified of troponin level increase to 14.39.  Patient states chest pain/pressure not any better or worse.  MD stated just start heparin gtt after ultrasound results are read.  No other orders received at this time.

## 2017-09-01 NOTE — PROGRESS NOTES
Received patient from ED via [employer].  Wife at bedside.  Patient is a/o x4 and ambulatory.  Complains of dull pressure to chest that is slightly better than this morning. MD aware. Awaiting stat bedside aortic ultrasound.  Per MD ok to feed patient after ultrasound.  Also per MD hold  heparin gtt until ultrasound results are in.  Plan of care discussed with patient.  Patient verbalized understanding.  Will monitor per protocol.

## 2017-09-01 NOTE — ED NOTES
Pt. Assisted back into [facility] and hooked up to monitoring equipment. Call light is within reach. Will continue to monitor.

## 2017-09-01 NOTE — CARE PLAN
Problem: Venous Thromboembolism (VTW)/Deep Vein Thrombosis (DVT) Prevention:  Goal: Patient will participate in Venous Thrombosis (VTE)/Deep Vein Thrombosis (DVT)Prevention Measures    Intervention: Assess and monitor for anticoagulation complications  Heparin gtt started.  Monitoring per protocol.

## 2017-09-01 NOTE — PROGRESS NOTES
Pt returned from Cath lab A&Ox4 pulses bounding at a 2+ amplitude. Vital signs 119/79, 90% on 3L titrated down from 94% on 8L. Pt states no pain at the moment. Will do serial vital signs post procedure per protocol.

## 2017-09-01 NOTE — CARE PLAN
Problem: Knowledge Deficit  Goal: Knowledge of disease process/condition, treatment plan, diagnostic tests, and medications will improve  Outcome: PROGRESSING AS EXPECTED  Pt at bedside with family. Pt was explained what to expect before and after his cardiac catheterization. All pt questions answered. Teach back method was used.    Problem: Discharge Barriers/Planning  Goal: Patient's continuum of care needs will be met  Outcome: PROGRESSING AS EXPECTED  Pt was educated on the discharge plan for after his cardiac cath. It was explained that we need to make sure his lung function is optimal, his secretions are managed, and any post-operative complications are addressed. Pt nodded in understanding.    Problem: Pain Management  Goal: Pain level will decrease to patient's comfort goal  Pt had 4/10 pain this morning for lower back. Repositioning, resting, distraction, and one on one conversation with family were identified as stress lowering activities influencing his pain.

## 2017-09-01 NOTE — ASSESSMENT & PLAN NOTE
- Chronic, stable  - s/p repair February 2017  - Repeat CTA aorta negative for acute abnormality  - Outpt follow up with Dr. Bloch when medically stable

## 2017-09-01 NOTE — ED PROVIDER NOTES
ED Provider Note    CHIEF COMPLAINT  Chief Complaint   Patient presents with   • Chest Pain       HPI  Khai Munoz is a 71 y.o. male Here for evaluation of chest pain, with onset earlier today. The patient states he has history of same, and that this particular pain is nonradiating. There is no fever, no vomiting, and no abdominal pain at this time.  No diaphoresis, no sob.  No leg pain.      PAST MEDICAL HISTORY   has a past medical history of Arrhythmia; Asthma; Breath shortness; Cancer (CMS-HCC) (2006); Chronic cough; Dental disorder; Emphysema of lung (CMS-HCC); High cholesterol; Psychiatric problem; 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CMS-HCC); Tinnitus; and Urinary bladder disorder.    SOCIAL HISTORY  Social History     Social History Main Topics   • Smoking status: Current Every Day Smoker     Packs/day: 0.50     Years: 50.00     Types: Cigarettes   • Smokeless tobacco: Never Used   • Alcohol use Yes      Comment: 375ml per day fo [place]arn[place]   • Drug use:       Comment: daily marijuana   • Sexual activity: Not on file       SURGICAL HISTORY   has a past surgical history that includes other; other; cyst excision; and aaa with stent graft (2/15/2017).    CURRENT MEDICATIONS  Home Medications     Reviewed by Emani Hunter (Pharmacy Tech) on 08/31/17 at 1936  Med List Status: Complete   Medication Last Dose Status   ADVAIR DISKUS 250-50 MCG/DOSE AEROSOL POWDER, BREATH ACTIVATED 8/31/2017 Active   aspirin 81 MG tablet 8/30/2017 Active   atorvastatin (LIPITOR) 80 MG tablet 8/30/2017 Active   Calcium Carb-Cholecalciferol (OYSTER SHELL CALCIUM/VITAMIN D) 250-125 MG-UNIT Tab tablet 8/30/2017 Active   Coenzyme Q10 (SM COENZYME Q-10) 100 MG Cap 8/30/2017 Active   CRANBERRY CONCENTRATE PO 8/30/2017 Active   diltiazem (CARDIZEM) 60 MG Tab 8/30/2017 Active   pyridoxine 100 MG tablet 8/30/2017 Active   SPIRIVA HANDIHALER 18 MCG Cap 8/31/2017 Active   tamsulosin (FLOMAX) 0.4 MG capsule 8/30/2017 Active           "      ALLERGIES  Allergies   Allergen Reactions   • Declomycin      Reaction a long time ago       REVIEW OF SYSTEMS  See HPI for further details. Review of systems as above, otherwise all other systems are negative.     PHYSICAL EXAM  VITAL SIGNS: /88   Pulse 100   Temp 37 °C (98.6 °F)   Resp 15   Ht 1.753 m (5' 9\")   Wt 68 kg (150 lb)   SpO2 92%   BMI 22.15 kg/m²     Constitutional: Well appearing patient.  Not toxic, nor ill in appearance.  HEENT: NC/AT.  Extra Ocular Muscles Intact.   Neck: Full range of motion; non tender.  Cardiovascular: Regular heart rate and rhythm.  No murmurs, rubs, nor gallop appreciated.   Back:  Non tender midline.  No obvious step off or deformity.  Thorax & Lungs: Lungs are clear to auscultation with good air movement bilaterally.  No wheeze, rhonchi, nor rales.   Abdomen: Soft, with no tenderness, rebound nor guarding.  No mass, pulsatile mass, nor hepatosplenomegaly appreciated.  Skin: No purpura nor petechia noted.  No rash.  Extremities/Musculoskeletal: No sign of trauma.    Musculoskeletal: Range of motion is intact in all major joints.  Neurologic: Alert & oriented x 3.  CN II-XII grossly intact.  Clear speech, appropriate, cooperative.  Psychiatric: Normal affect appropriate for the clinical situation.    Results for orders placed or performed during the hospital encounter of 08/31/17   Troponin   Result Value Ref Range    Troponin I 11.35 (H) 0.00 - 0.04 ng/mL   Btype Natriuretic Peptide   Result Value Ref Range    B Natriuretic Peptide 683 (H) 0 - 100 pg/mL   CBC with Differential   Result Value Ref Range    WBC 12.0 (H) 4.8 - 10.8 K/uL    RBC 4.16 (L) 4.70 - 6.10 M/uL    Hemoglobin 13.7 (L) 14.0 - 18.0 g/dL    Hematocrit 39.3 (L) 42.0 - 52.0 %    MCV 94.5 81.4 - 97.8 fL    MCH 32.9 27.0 - 33.0 pg    MCHC 34.9 33.7 - 35.3 g/dL    RDW 56.1 (H) 35.9 - 50.0 fL    Platelet Count 182 164 - 446 K/uL    MPV 10.9 9.0 - 12.9 fL    Neutrophils-Polys 75.40 (H) 44.00 - " 72.00 %    Lymphocytes 14.80 (L) 22.00 - 41.00 %    Monocytes 7.40 0.00 - 13.40 %    Eosinophils 1.60 0.00 - 6.90 %    Basophils 0.40 0.00 - 1.80 %    Immature Granulocytes 0.40 0.00 - 0.90 %    Nucleated RBC 0.00 /100 WBC    Neutrophils (Absolute) 9.01 (H) 1.82 - 7.42 K/uL    Lymphs (Absolute) 1.77 1.00 - 4.80 K/uL    Monos (Absolute) 0.88 (H) 0.00 - 0.85 K/uL    Eos (Absolute) 0.19 0.00 - 0.51 K/uL    Baso (Absolute) 0.05 0.00 - 0.12 K/uL    Immature Granulocytes (abs) 0.05 0.00 - 0.11 K/uL    NRBC (Absolute) 0.00 K/uL   Complete Metabolic Panel (CMP)   Result Value Ref Range    Sodium 134 (L) 135 - 145 mmol/L    Potassium 4.0 3.6 - 5.5 mmol/L    Chloride 103 96 - 112 mmol/L    Co2 22 20 - 33 mmol/L    Anion Gap 9.0 0.0 - 11.9    Glucose 111 (H) 65 - 99 mg/dL    Bun 14 8 - 22 mg/dL    Creatinine 1.61 (H) 0.50 - 1.40 mg/dL    Calcium 9.6 8.5 - 10.5 mg/dL    AST(SGOT) 71 (H) 12 - 45 U/L    ALT(SGPT) 23 2 - 50 U/L    Alkaline Phosphatase 20 (L) 30 - 99 U/L    Total Bilirubin 0.7 0.1 - 1.5 mg/dL    Albumin 3.9 3.2 - 4.9 g/dL    Total Protein 7.3 6.0 - 8.2 g/dL    Globulin 3.4 1.9 - 3.5 g/dL    A-G Ratio 1.1 g/dL   Prothrombin Time   Result Value Ref Range    PT 13.6 12.0 - 14.6 sec    INR 1.01 0.87 - 1.13   APTT   Result Value Ref Range    APTT 30.3 24.7 - 36.0 sec   Lipase   Result Value Ref Range    Lipase 10 (L) 11 - 82 U/L   ESTIMATED GFR   Result Value Ref Range    GFR If  51 (A) >60 mL/min/1.73 m 2    GFR If Non  42 (A) >60 mL/min/1.73 m 2   EKG (ER)   Result Value Ref Range    Report       [facility] Emergency Dept.    Test Date:  2017  Pt Name:    AMANDO CANO                Department: ER  MRN:        [id number]                      Room:       [facility]  Gender:     M                            Technician: [id number]  :        [id number]                   Requested By:ER TRIAGE PROTOCOL  Order #:    [id number]                    Reading  MD:    Measurements  Intervals                                Axis  Rate:       96                           P:          60  WA:         152                          QRS:        20  QRSD:       86                           T:          72  QT:         364  QTc:        460    Interpretive Statements  SINUS RHYTHM  VENTRICULAR PREMATURE COMPLEX  ABERRANT COMPLEX, POSSIBLY SUPRAVENTRICULAR  MINIMAL ST DEPRESSION, ANTEROLATERAL LEADS  Compared to ECG 01/20/2017 14:26:17  Ventricular premature complex(es) now present  Aberrant conduction of supraventricular beat(s) now present  ST (T wave) deviation now present  Atrial christel ture complex(es) no longer present       DX-CHEST-LIMITED (1 VIEW)   Final Result      Asymmetric right diffuse interstitial infiltrates. This may represent presence of asymmetric pulmonary edema or infectious process.      CT-CTA COMPLETE THORACOABDOMINAL AORTA    (Results Pending)       PROCEDURES     MEDICAL RECORD  I have reviewed patient's medical record and pertinent results are listed above.    COURSE & MEDICAL DECISION MAKING  I have reviewed any medical record information, laboratory studies and radiographic results as noted above.    7:42 PM  Dr. Hernandez will admit the pt.    7:48 PM  Cards consulted.  He would like heparin, aspirin.      8:43 PM  Heparin held secondary to waiting for a ct of the abdomen/pelvis, for a previous dissection repair.   Dr. Hernandez will follow.    CRITICAL CARE  The very real possibility of a deterioration of this patient's condition required the highest level of my preparedness for sudden, emergent intervention.  I provided critical care services, which included medication orders, frequent reevaluations of the patient's condition and response to treatment, ordering and reviewing test results, and discussing the case with various consultants.  The critical care time associated with the care of the patient was 35 minutes. Review chart for interventions. This time is  exclusive of any other billable procedures.       FINAL IMPRESSION  1. Chest pain, unspecified type    2.       Critical care time 35 minutes.  3.       ACS    Electronically signed by: Ramiro Wilde, 8/31/2017 7:42 PM

## 2017-09-01 NOTE — CATH LAB
Immediate Post-Operative Note      PreOp Diagnosis: non STEMI    PostOp Diagnosis: same    Procedure(s) :  Coronary Angiography, Left Heart Catheterization, Left Ventriculography.  3.5 x 18 mm Xience stent proximal LAD    Surgeon(s):  Baldo Bullard M.D.    Type of Anesthesia: Moderate Sedation    Specimen: None    Estimated Blood Loss: 20 cc's    Contrast Media:  247 cc's    Fluoro Time: 20.1 min    Xray DAP: [id number]    Findings: subtotal Proximal LAD with CRYSTAL II flow beyond stenosis, stented with good result.  Jailed diag open, could not place balloon across ostium of HELDER    Complications: none      Baldo Bullard M.D.  9/1/2017 3:55 PM

## 2017-09-01 NOTE — PROGRESS NOTES
Monitor summary    SR/ST , occasional PVC's, rare coupletes    2 episodes of asymptomatic v-tach (7 bts and 4 bts)    .16/.08/.32      12 hour chart check completed

## 2017-09-01 NOTE — PROGRESS NOTES
Report received. Assessment given. Family at bedside. Pt lungs were clear to auscultation. Pt described aching muscle pain in his lower back at a 4/10 but refused pain medication at this time. Pt educated on plan of the day to go to Cath Lab this afternoon.

## 2017-09-01 NOTE — ED NOTES
Pt. Updated on the plan of care to be admitted to the hospital. Call light is within reach. Will continue to monitor.

## 2017-09-02 PROBLEM — I50.20 SYSTOLIC HEART FAILURE (HCC): Status: ACTIVE | Noted: 2017-09-02

## 2017-09-02 PROBLEM — Z78.9 ALCOHOL USE: Status: ACTIVE | Noted: 2017-09-02

## 2017-09-02 PROBLEM — E87.6 HYPOKALEMIA: Status: ACTIVE | Noted: 2017-09-02

## 2017-09-02 LAB
ALBUMIN SERPL BCP-MCNC: 2.6 G/DL (ref 3.2–4.9)
BASOPHILS # BLD AUTO: 0.1 % (ref 0–1.8)
BASOPHILS # BLD: 0.01 K/UL (ref 0–0.12)
BUN SERPL-MCNC: 18 MG/DL (ref 8–22)
CALCIUM SERPL-MCNC: 7.1 MG/DL (ref 8.5–10.5)
CHLORIDE SERPL-SCNC: 113 MMOL/L (ref 96–112)
CO2 SERPL-SCNC: 16 MMOL/L (ref 20–33)
CREAT SERPL-MCNC: 1.36 MG/DL (ref 0.5–1.4)
EKG IMPRESSION: NORMAL
EKG IMPRESSION: NORMAL
EOSINOPHIL # BLD AUTO: 0 K/UL (ref 0–0.51)
EOSINOPHIL NFR BLD: 0 % (ref 0–6.9)
ERYTHROCYTE [DISTWIDTH] IN BLOOD BY AUTOMATED COUNT: 58.4 FL (ref 35.9–50)
GFR SERPL CREATININE-BSD FRML MDRD: 51 ML/MIN/1.73 M 2
GLUCOSE SERPL-MCNC: 112 MG/DL (ref 65–99)
HCT VFR BLD AUTO: 35.9 % (ref 42–52)
HGB BLD-MCNC: 12 G/DL (ref 14–18)
IMM GRANULOCYTES # BLD AUTO: 0.03 K/UL (ref 0–0.11)
IMM GRANULOCYTES NFR BLD AUTO: 0.4 % (ref 0–0.9)
LYMPHOCYTES # BLD AUTO: 0.76 K/UL (ref 1–4.8)
LYMPHOCYTES NFR BLD: 9 % (ref 22–41)
MAGNESIUM SERPL-MCNC: 1.6 MG/DL (ref 1.5–2.5)
MCH RBC QN AUTO: 32.6 PG (ref 27–33)
MCHC RBC AUTO-ENTMCNC: 33.4 G/DL (ref 33.7–35.3)
MCV RBC AUTO: 97.6 FL (ref 81.4–97.8)
MONOCYTES # BLD AUTO: 0.27 K/UL (ref 0–0.85)
MONOCYTES NFR BLD AUTO: 3.2 % (ref 0–13.4)
NEUTROPHILS # BLD AUTO: 7.33 K/UL (ref 1.82–7.42)
NEUTROPHILS NFR BLD: 87.3 % (ref 44–72)
NRBC # BLD AUTO: 0 K/UL
NRBC BLD AUTO-RTO: 0 /100 WBC
PHOSPHATE SERPL-MCNC: 2.9 MG/DL (ref 2.5–4.5)
PLATELET # BLD AUTO: 142 K/UL (ref 164–446)
PMV BLD AUTO: 11.2 FL (ref 9–12.9)
POTASSIUM SERPL-SCNC: 3.5 MMOL/L (ref 3.6–5.5)
RBC # BLD AUTO: 3.68 M/UL (ref 4.7–6.1)
SODIUM SERPL-SCNC: 137 MMOL/L (ref 135–145)
WBC # BLD AUTO: 8.4 K/UL (ref 4.8–10.8)

## 2017-09-02 PROCEDURE — 93010 ELECTROCARDIOGRAM REPORT: CPT | Performed by: INTERNAL MEDICINE

## 2017-09-02 PROCEDURE — 80069 RENAL FUNCTION PANEL: CPT

## 2017-09-02 PROCEDURE — 85025 COMPLETE CBC W/AUTO DIFF WBC: CPT

## 2017-09-02 PROCEDURE — 93005 ELECTROCARDIOGRAM TRACING: CPT | Performed by: INTERNAL MEDICINE

## 2017-09-02 PROCEDURE — A9270 NON-COVERED ITEM OR SERVICE: HCPCS | Performed by: INTERNAL MEDICINE

## 2017-09-02 PROCEDURE — 83735 ASSAY OF MAGNESIUM: CPT

## 2017-09-02 PROCEDURE — A9270 NON-COVERED ITEM OR SERVICE: HCPCS | Performed by: HOSPITALIST

## 2017-09-02 PROCEDURE — 700105 HCHG RX REV CODE 258: Performed by: INTERNAL MEDICINE

## 2017-09-02 PROCEDURE — 700102 HCHG RX REV CODE 250 W/ 637 OVERRIDE(OP): Performed by: INTERNAL MEDICINE

## 2017-09-02 PROCEDURE — 700111 HCHG RX REV CODE 636 W/ 250 OVERRIDE (IP): Performed by: HOSPITALIST

## 2017-09-02 PROCEDURE — 51798 US URINE CAPACITY MEASURE: CPT

## 2017-09-02 PROCEDURE — 700102 HCHG RX REV CODE 250 W/ 637 OVERRIDE(OP): Performed by: HOSPITALIST

## 2017-09-02 PROCEDURE — 99233 SBSQ HOSP IP/OBS HIGH 50: CPT | Performed by: HOSPITALIST

## 2017-09-02 PROCEDURE — 700105 HCHG RX REV CODE 258: Performed by: HOSPITALIST

## 2017-09-02 PROCEDURE — 700111 HCHG RX REV CODE 636 W/ 250 OVERRIDE (IP): Performed by: INTERNAL MEDICINE

## 2017-09-02 PROCEDURE — 770022 HCHG ROOM/CARE - ICU (200)

## 2017-09-02 RX ORDER — POTASSIUM CHLORIDE 20 MEQ/1
40 TABLET, EXTENDED RELEASE ORAL 2 TIMES DAILY
Status: COMPLETED | OUTPATIENT
Start: 2017-09-02 | End: 2017-09-02

## 2017-09-02 RX ORDER — MAGNESIUM SULFATE HEPTAHYDRATE 40 MG/ML
4 INJECTION, SOLUTION INTRAVENOUS ONCE
Status: COMPLETED | OUTPATIENT
Start: 2017-09-02 | End: 2017-09-02

## 2017-09-02 RX ORDER — SPIRONOLACTONE 25 MG/1
25 TABLET ORAL
Status: DISCONTINUED | OUTPATIENT
Start: 2017-09-02 | End: 2017-09-04

## 2017-09-02 RX ORDER — LISINOPRIL 5 MG/1
5 TABLET ORAL
Status: DISCONTINUED | OUTPATIENT
Start: 2017-09-02 | End: 2017-09-05 | Stop reason: HOSPADM

## 2017-09-02 RX ADMIN — BUDESONIDE AND FORMOTEROL FUMARATE DIHYDRATE 2 PUFF: 160; 4.5 AEROSOL RESPIRATORY (INHALATION) at 20:35

## 2017-09-02 RX ADMIN — STANDARDIZED SENNA CONCENTRATE AND DOCUSATE SODIUM 2 TABLET: 8.6; 5 TABLET, FILM COATED ORAL at 10:41

## 2017-09-02 RX ADMIN — METOPROLOL TARTRATE 25 MG: 25 TABLET, FILM COATED ORAL at 10:41

## 2017-09-02 RX ADMIN — ASPIRIN 81 MG: 81 TABLET ORAL at 10:41

## 2017-09-02 RX ADMIN — TAMSULOSIN HYDROCHLORIDE 0.8 MG: 0.4 CAPSULE ORAL at 20:31

## 2017-09-02 RX ADMIN — PIPERACILLIN SODIUM AND TAZOBACTAM SODIUM 3.38 G: 3; .375 INJECTION, POWDER, FOR SOLUTION INTRAVENOUS at 09:12

## 2017-09-02 RX ADMIN — ATORVASTATIN CALCIUM 80 MG: 80 TABLET, FILM COATED ORAL at 10:41

## 2017-09-02 RX ADMIN — AMPICILLIN SODIUM AND SULBACTAM SODIUM 3 G: 2; 1 INJECTION, POWDER, FOR SOLUTION INTRAMUSCULAR; INTRAVENOUS at 23:55

## 2017-09-02 RX ADMIN — TIOTROPIUM BROMIDE 1 CAPSULE: 18 CAPSULE ORAL; RESPIRATORY (INHALATION) at 11:55

## 2017-09-02 RX ADMIN — MAGNESIUM SULFATE IN WATER 4 G: 40 INJECTION, SOLUTION INTRAVENOUS at 11:27

## 2017-09-02 RX ADMIN — POTASSIUM CHLORIDE 40 MEQ: 1500 TABLET, EXTENDED RELEASE ORAL at 10:41

## 2017-09-02 RX ADMIN — BUDESONIDE AND FORMOTEROL FUMARATE DIHYDRATE 2 PUFF: 160; 4.5 AEROSOL RESPIRATORY (INHALATION) at 11:54

## 2017-09-02 RX ADMIN — POTASSIUM CHLORIDE 40 MEQ: 1500 TABLET, EXTENDED RELEASE ORAL at 20:31

## 2017-09-02 RX ADMIN — METOPROLOL TARTRATE 25 MG: 25 TABLET, FILM COATED ORAL at 20:31

## 2017-09-02 RX ADMIN — STANDARDIZED SENNA CONCENTRATE AND DOCUSATE SODIUM 2 TABLET: 8.6; 5 TABLET, FILM COATED ORAL at 20:31

## 2017-09-02 RX ADMIN — PIPERACILLIN SODIUM AND TAZOBACTAM SODIUM 3.38 G: 3; .375 INJECTION, POWDER, FOR SOLUTION INTRAVENOUS at 01:05

## 2017-09-02 RX ADMIN — AMPICILLIN SODIUM AND SULBACTAM SODIUM 3 G: 2; 1 INJECTION, POWDER, FOR SOLUTION INTRAMUSCULAR; INTRAVENOUS at 18:36

## 2017-09-02 RX ADMIN — LISINOPRIL 5 MG: 5 TABLET ORAL at 20:31

## 2017-09-02 RX ADMIN — PRASUGREL HYDROCHLORIDE 10 MG: 10 TABLET, FILM COATED ORAL at 10:41

## 2017-09-02 RX ADMIN — SPIRONOLACTONE 25 MG: 25 TABLET, FILM COATED ORAL at 10:41

## 2017-09-02 ASSESSMENT — LIFESTYLE VARIABLES
ANXIETY: NO ANXIETY (AT EASE)
HEADACHE, FULLNESS IN HEAD: NOT PRESENT
PAROXYSMAL SWEATS: NO SWEAT VISIBLE
TOTAL SCORE: 0
NAUSEA AND VOMITING: NO NAUSEA AND NO VOMITING
ORIENTATION AND CLOUDING OF SENSORIUM: ORIENTED AND CAN DO SERIAL ADDITIONS
PAROXYSMAL SWEATS: NO SWEAT VISIBLE
AUDITORY DISTURBANCES: NOT PRESENT
PAROXYSMAL SWEATS: NO SWEAT VISIBLE
HEADACHE, FULLNESS IN HEAD: NOT PRESENT
ORIENTATION AND CLOUDING OF SENSORIUM: ORIENTED AND CAN DO SERIAL ADDITIONS
NAUSEA AND VOMITING: NO NAUSEA AND NO VOMITING
TOTAL SCORE: 0
TOTAL SCORE: 5
VISUAL DISTURBANCES: NOT PRESENT
ANXIETY: NO ANXIETY (AT EASE)
HEADACHE, FULLNESS IN HEAD: NOT PRESENT
TREMOR: NO TREMOR
AGITATION: NORMAL ACTIVITY
AUDITORY DISTURBANCES: NOT PRESENT
ANXIETY: NO ANXIETY (AT EASE)
PAROXYSMAL SWEATS: NO SWEAT VISIBLE
TREMOR: NO TREMOR
ORIENTATION AND CLOUDING OF SENSORIUM: DISORIENTED FOR PLACE AND / OR PERSON
TREMOR: TREMOR NOT VISIBLE BUT CAN BE FELT, FINGERTIP TO FINGERTIP
TREMOR: NO TREMOR
VISUAL DISTURBANCES: NOT PRESENT
ANXIETY: NO ANXIETY (AT EASE)
NAUSEA AND VOMITING: NO NAUSEA AND NO VOMITING
TOTAL SCORE: 5
NAUSEA AND VOMITING: NO NAUSEA AND NO VOMITING
ORIENTATION AND CLOUDING OF SENSORIUM: ORIENTED AND CAN DO SERIAL ADDITIONS
AGITATION: NORMAL ACTIVITY
AGITATION: NORMAL ACTIVITY
TREMOR: NO TREMOR
NAUSEA AND VOMITING: NO NAUSEA AND NO VOMITING
VISUAL DISTURBANCES: NOT PRESENT
HEADACHE, FULLNESS IN HEAD: NOT PRESENT
AUDITORY DISTURBANCES: NOT PRESENT
NAUSEA AND VOMITING: NO NAUSEA AND NO VOMITING
HEADACHE, FULLNESS IN HEAD: NOT PRESENT
AGITATION: NORMAL ACTIVITY
TOTAL SCORE: 0
TREMOR: NO TREMOR
PAROXYSMAL SWEATS: NO SWEAT VISIBLE
ANXIETY: NO ANXIETY (AT EASE)
AUDITORY DISTURBANCES: NOT PRESENT
TOTAL SCORE: 0
VISUAL DISTURBANCES: NOT PRESENT
PAROXYSMAL SWEATS: NO SWEAT VISIBLE
AUDITORY DISTURBANCES: NOT PRESENT
AGITATION: NORMAL ACTIVITY
VISUAL DISTURBANCES: NOT PRESENT
PAROXYSMAL SWEATS: NO SWEAT VISIBLE
TREMOR: TREMOR NOT VISIBLE BUT CAN BE FELT, FINGERTIP TO FINGERTIP
AUDITORY DISTURBANCES: NOT PRESENT
NAUSEA AND VOMITING: NO NAUSEA AND NO VOMITING
ORIENTATION AND CLOUDING OF SENSORIUM: ORIENTED AND CAN DO SERIAL ADDITIONS
HEADACHE, FULLNESS IN HEAD: NOT PRESENT
VISUAL DISTURBANCES: NOT PRESENT
AGITATION: NORMAL ACTIVITY
ANXIETY: NO ANXIETY (AT EASE)
AGITATION: NORMAL ACTIVITY
ORIENTATION AND CLOUDING OF SENSORIUM: DISORIENTED FOR PLACE AND / OR PERSON
ANXIETY: NO ANXIETY (AT EASE)

## 2017-09-02 ASSESSMENT — ENCOUNTER SYMPTOMS
NAUSEA: 1
WEAKNESS: 0
EYE REDNESS: 0
SHORTNESS OF BREATH: 1
NERVOUS/ANXIOUS: 0
ABDOMINAL PAIN: 0
DIARRHEA: 0
HEADACHES: 0
SPEECH CHANGE: 0
NAUSEA: 0
BRUISES/BLEEDS EASILY: 0
DIZZINESS: 0
SHORTNESS OF BREATH: 0
COUGH: 0
ABDOMINAL PAIN: 1
STRIDOR: 0
PALPITATIONS: 0
DEPRESSION: 0
BACK PAIN: 0
FEVER: 0

## 2017-09-02 ASSESSMENT — PAIN SCALES - GENERAL
PAINLEVEL_OUTOF10: 0

## 2017-09-02 NOTE — PROGRESS NOTES
Hospital Medicine Progress Note, Adult, Complex               Author: Alfie Tan Date & Time created: 9/2/2017  8:16 AM     Interval History:  S/p PCI to LAD good result    Pt sleepy this AM    Wife reports sig ETOH    Review of Systems:  Review of Systems   Constitutional: Positive for malaise/fatigue.   Respiratory: Negative for shortness of breath.    Cardiovascular: Negative for chest pain and palpitations.   Gastrointestinal: Positive for abdominal pain and nausea.   Skin: Negative for rash.   Neurological: Negative for dizziness and headaches.   Endo/Heme/Allergies: Does not bruise/bleed easily.       Physical Exam:  Physical Exam    Labs:  Recent Labs      09/01/17   2140   ISTATAPH  7.436   ISTATAPCO2  27.1   ISTATAPO2  74   ISTATATCO2  19*   FOCTRYI2BZU  96   ISTATARTHCO3  18.2   ISTATARTBE  -5*   ISTATTEMP  96.9 F   ISTATSPEC  Arterial   ISTATAPHTC  7.450   UMSOFXDC2CE  70     Recent Labs      08/31/17   1800  08/31/17 2220 09/01/17 0211   TROPONINI  11.35*  14.39*  14.10*   BNPBTYPENAT  683*   --    --      Recent Labs      08/31/17 1800 09/01/17 0211 09/02/17   0605   SODIUM  134*  135  137   POTASSIUM  4.0  3.9  3.5*   CHLORIDE  103  103  113*   CO2  22  20  16*   BUN  14  17  18   CREATININE  1.61*  1.64*  1.36   MAGNESIUM   --    --   1.6   PHOSPHORUS   --    --   2.9   CALCIUM  9.6  8.7  7.1*     Recent Labs      08/31/17 1800 09/01/17 0211 09/02/17   0605   ALTSGPT  23  21   --    ASTSGOT  71*  78*   --    ALKPHOSPHAT  20*  18*   --    TBILIRUBIN  0.7  0.8   --    LIPASE  10*   --    --    GLUCOSE  111*  134*  112*     Recent Labs      08/31/17   1800  08/31/17 2220 09/01/17 0211 09/01/17   0625  09/01/17   1216  09/02/17   0605   RBC  4.16*   --   3.95*   --    --   3.68*   HEMOGLOBIN  13.7*   --   12.5*   --    --   12.0*   HEMATOCRIT  39.3*   --   36.8*   --    --   35.9*   PLATELETCT  182   --   164   --    --   142*   PROTHROMBTM  13.6   --    --    --    --     --    APTT  30.3  29.7   --   149.1*  153.3*   --    INR  1.01   --    --    --    --    --      Recent Labs      17   1800  17   0211  17   0605   WBC  12.0*  11.8*  8.4   NEUTSPOLYS  75.40*  72.00  87.30*   LYMPHOCYTES  14.80*  17.40*  9.00*   MONOCYTES  7.40  7.00  3.20   EOSINOPHILS  1.60  2.60  0.00   BASOPHILS  0.40  0.50  0.10   ASTSGOT  71*  78*   --    ALTSGPT  23  21   --    ALKPHOSPHAT  20*  18*   --    TBILIRUBIN  0.7  0.8   --            Hemodynamics:  Temp (24hrs), Av.6 °C (97.8 °F), Min:35.7 °C (96.3 °F), Max:37.1 °C (98.7 °F)  Temperature: (!) 35.7 °C (96.3 °F)  Pulse  Av.3  Min: 63  Max: 116Heart Rate (Monitored): 100  Blood Pressure : 104/66, NIBP: 112/77     Respiratory:    Respiration: (!) 24, Pulse Oximetry: 96 %, O2 Daily Delivery Respiratory : OxyMask     Work Of Breathing / Effort: Mild  RUL Breath Sounds: Clear, RML Breath Sounds: Diminished, RLL Breath Sounds: Diminished, GISSELLE Breath Sounds: Clear, LLL Breath Sounds: Diminished  Fluids:    Intake/Output Summary (Last 24 hours) at 17 0816  Last data filed at 17 0600   Gross per 24 hour   Intake              100 ml   Output              140 ml   Net              -40 ml     Weight: 74.7 kg (164 lb 10.9 oz)  GI/Nutrition:  Orders Placed This Encounter   Procedures   • Diet Order     Standing Status:   Standing     Number of Occurrences:   1     Order Specific Question:   Diet:     Answer:   Cardiac [6]     Medical Decision Making, by Problem:  Active Hospital Problems    Diagnosis   • *NSTEMI (non-ST elevated myocardial infarction) (CMS-HCC) [I21.4]   • Tobacco abuse [Z72.0]   • Aorta aneurysm (CMS-HCC) [I71.9]   • Chest pain [R07.9]   • TD (acute kidney injury) (CMS-[facility]) [N17.9]   • Nausea [R11.0]   • COPD (chronic obstructive pulmonary disease) (CMS-[facility]) [J44.9]     CAD   S/p PCI to LAD  effient  Aspirin  Reinforced importance to pt and wife  Metop  Ef 35  Add ace if BP allows  Added aldactone  Smoking  cessation    likely has underlying sepsis on empiric abx at risk for aspiration with sig etoh      Monitor for etoh withdrawal    Transfer to floor if BP remains stable    It is my pleasure to participate in the care of Mr. Munoz.  Please do not hesitate to contact me with questions or concerns.    Alfie Tan MD PhD FACC  Cardiologist [facility]      Quality-Core Measures

## 2017-09-02 NOTE — PROGRESS NOTES
Bedside report taken by this RN following transfer to higher level of care/rapid response called. Pt. VSS, apneic periods noted. Maintaining SpO2 with 10L Oxymizer mask. Wife at bedside, Pt. currently obtunded, maintaining airway.     Hospitalist paged for orders and confirmation of plan of care. To bedside, confirmed plan of care. Care continuing.

## 2017-09-02 NOTE — RESPIRATORY CARE
Respiratory Rapid Response Note    Symptoms CIWA protocol initiated today. Pt having increased O2 demands and increased periods of apnea     Breath Sounds  RUL Breath Sounds: Clear (09/01/17 0800)  RML Breath Sounds: Crackles (09/01/17 0800)  RLL Breath Sounds: Crackles (09/01/17 0800)  GISSELLE Breath Sounds: Clear (09/01/17 0800)  LLL Breath Sounds: Diminished (09/01/17 0800)  Cough: Non Productive;Congested;Strong (09/01/17 0800)  Sputum Amount: Unable to Evaluate (09/01/17 0800)  Sputum Color: Unable to Evaluate (09/01/17 0800)  Sputum Consistency: Unable to Evaluate (09/01/17 0800)  ABG Results 7.45, 26.0, 70, -5, 18.2, 96%  O2 (FiO2): 36 (08/31/17 2239)  O2 (LPM): 10 (09/01/17 2145)  O2 Daily Delivery Respiratory : OxyMask (09/01/17 2145)    Events/Summary/Plan: RRT called. RT obtained an ABG. Pt is currently on 10lpm oxymask with increased periods of apnea. Witnessed periods of apnea up to 30 seconds in length.  (09/01/17 2145)  Transferred to ICU

## 2017-09-02 NOTE — CODE DOCUMENTATION
APRN returned paged, updated on patient's increasing O2 needs and apnea episodes. RN to follow up with ABG results once received.

## 2017-09-02 NOTE — DISCHARGE PLANNING
Upon utilization review, patient noted to be on the following medications that could potentially require prior authorization if prescribed at discharge: Effient.  If it is anticipated that patient will require these medications at discharge, beginning the prescription prior auth process in advance to anticipated discharge could assist in preventing delays when patient is medically cleared to be discharged from the hospital.

## 2017-09-02 NOTE — PROCEDURES
DATE OF SERVICE:  09/01/2017    PROCEDURES:  1.  Selective coronary angiography.  2.  Left heart catheterization.  3.  Left ventriculography using the hand injection of contrast.  4.  Stenting of subtotally occluded proximal LAD with 3.5x18 mm Xience Alpine   stent.    INDICATIONS:  The patient is a 72-year-old gentleman admitted to the hospital   with chest pain and evidence of a non-STEMI myocardial infarction.  He is   undergoing angiography at this time to determine the extent of his coronary   artery disease and the need for intervention.    DESCRIPTION OF PROCEDURE:  The right wrist was sterilely prepped and draped in   the usual fashion and the area of the right renal artery was anesthetized   with 2% lidocaine.  Conscious sedation was obtained using Versed 1 mg and   fentanyl 50 mcg.  He received additional Versed and fentanyl during the   procedure.  Please refer to the nurses' notes for dosages and timing.    The right radial artery was easily entered and a 6-Wolof sheath was placed.    He was then given 3000 units of heparin, parenthetically been on heparin on   the floor and the IV infusion had recently been discontinued.  He was then   given 100 mcg of intra-arterial nitroglycerin and 2.5 mg of intra-arterial   verapamil.  A 5-Wolof Sudhakar catheter was placed and advanced into the ostium   of the right coronary artery where selective angiograms.  This catheter was   then manipulated into the ostium of the left main coronary artery where   selective angiograms were performed.  Next, a right heart catheterization was   performed.  The catheter was advanced across the aortic valve and into the   left ventricle with the aid of a guidewire.  Resting pressures were measured.    Because of the patient's stage III kidney disease and elevated LVEDP, a very   small amount of contrast was given through the catheter for left   ventriculogram.  He received an 8 mL of bolus of contrast for the left   ventriculogram.  " A left heart pullback was performed.    Coronary intervention was performed.  An ACT was checked and he was given   additional heparin.  An EBU guide was placed and it did not seat well and was   replaced with a JL5 guide.  A BMW wire was placed into the distal LAD.    Multiple attempts were made to cross the first diagonal artery, which was   immediately distal to the site of the lesion in a ____ fashion.  Numerous   guidewires were used, but the guidewire could not be passed into the LAD   diagonal.  Therefore, the LAD was angioplastied with a 3.5 mm balloon and then   stented with a 3.5 mm x 18 mm Xience Alpine stent.  This stent was   postdilated with a 3.5 mm balloon.  Post-intervention, further attempts were   made to place a wire into the \"jailed\" LAD diagonal.  The diagonal was   eventually wired with a Whisper wire, but a balloon would not pass across the   stent.  After numerous attempts, further attempts to do angioplasty origin of   the LAD diagonal abandoned.  Final angiograms revealed a fully deployed stent   in the proximal LAD with CRYSTAL-3 flow to the terminus of the vessel.  The   \"jailed\" diagonal artery had CRYSTAL 3 flow, but residual ostial stenosis.  No   filling defects were noted.  The guiding catheter was removed.  Additional   nitroglycerin was given through the sheath and the sheath was removed.    Hemostasis was obtained with the Hemoband.    COMPLICATIONS:  There were no complications.    ESTIMATED BLOOD LOSS:  15 mL    FLUOROSCOPY TIME:  20.4 minutes.    X-RAY DOSE-AREA PRODUCT:  25,524.    TOTAL CONTRAST MEDIA:  247 mL of Omnipaque 350.    HEMODYNAMICS:  Revealed aortic pressure of 88/40 mmHg, LV pressure of 107/29   mmHg.  Estimated ejection fraction 45%.  The left ventriculogram demonstrates   hypokinesis of the anterior wall with estimated ejection fraction of 45% and   again, image quality is limited due to the small amount of contrast used   because of the patient's kidney failure " "and elevated LVEDP.    The left main is free of disease and bifurcates into the LAD and circumflex.    The LAD supplies the apex and also wraps around slightly inferoapical segment.    There is a subtotal stenosis (greater than 95%) in the proximal LAD at the   level of the first diagonal artery.  This is a moderate-sized diagonal branch.    There is CRYSTAL 2 flow in the LAD beyond this stenosis and also CRYSTAL 2 flow in   the diagonal artery.  More distally, the LAD gives rise to a second diagonal   artery.  Post-interventional angiograms revealed a widely patent stent in the   proximal LAD.  There is CRYSTAL 3 flow into the mid to distal LAD and also more   prompt flow into the LAD diagonal.  There is residual stenosis of the LAD   diagonal that is greater than 75%.    The circumflex has a 20% inferior stenosis in its proximal portion and then   gives rise to a large caliber obtuse marginal artery.  This artery has   plaquing in its proximal segment that is not angiographically significant   followed by 40% stenosis in its mid portion.  Beyond the origin of this   marginal artery, the circumflex continues in the AV groove and then terminates   in an atrial branch and a small distal marginal artery.    The right coronary artery is a dominant vessel and of large caliber.  This   vessel has a 40% lesion proximally.  The vessel gives rise to a proximal   atrial branch and a conus branch.  An RV free wall branch arises in mid   vessel.  Distally, the right coronary artery gives rise to a moderate-sized   PDA and a large posterior left ventricular artery.    IMPRESSION:  1.  Successful stenting of subtotal stenosis of the proximal left anterior   descending with a 3.5x18 mm Xience Alpine stent.  2.  A 40% lesion of the proximal right coronary artery.  3.  A 40% lesion in the mid obtuse marginal artery.  4.  Stenosis at the origin of a \"jailed\" diagonal artery.  This lesion could   not be angioplastied as it would not pass " through the side of the stent.  5.  Elevated LVEDP.  6.  LV dysfunction with EF in the 45% range.       ____________________________________     MD JEMAL HUNTER / SUKHJINDER    DD:  09/01/2017 18:32:27  DT:  09/01/2017 20:39:07    D#:  [id number]  Job#:  [id number]

## 2017-09-02 NOTE — PROGRESS NOTES
Telemetry Report: Pt. HR NSR/ST, 70s-105. Frequent PACs and multifocal PVCs noted per telemetry. VSS.     0.18/0.08/0.36    12 Hour Chart Check.

## 2017-09-02 NOTE — PROGRESS NOTES
Assumed care of patient.  Patient severely agitated, verbally abusive to wife and staff.  CIWA protocol in place.  Will follow.  Bed in low/locked position.  Call light in reach.

## 2017-09-02 NOTE — PROGRESS NOTES
"Pt states \"I feel like i'm having an anxiety attack. I'm having trouble catching my breath.\" SaO2 is 95% on 4L of oxygen and distraction techniques as well as one on one discussion with wife helped to decrease his respirations. Will continue to monitor.  "

## 2017-09-02 NOTE — DIETARY
Nutrition Services:   Consult received for cardiac rehab diet instruction. Attempted to visit pt for cardiac diet education twice, pt was busy with other discipline. RD will follow up for diet education.     RD available prn.

## 2017-09-02 NOTE — PROGRESS NOTES
Remaining 5 ml air removed from right radial hemoband.  Dry gauze and tegaderm dressing applied.  No evidence of bleeding noted.  Pulse present and strong.  Will monitor.

## 2017-09-02 NOTE — CARE PLAN
Problem: Safety  Goal: Will remain free from injury  Outcome: PROGRESSING AS EXPECTED  No injury this shift.   Goal: Will remain free from falls  Outcome: PROGRESSING AS EXPECTED  No falls this shift.     Problem: Knowledge Deficit  Goal: Knowledge of disease process/condition, treatment plan, diagnostic tests, and medications will improve  Outcome: PROGRESSING SLOWER THAN EXPECTED  Patient and family educated on plan of care, treatment plan, medications with transfer to ICU.

## 2017-09-02 NOTE — PROGRESS NOTES
RRT called for increased episodes of apnea.  Apneic episodes lasting greater than 15 seconds.  Patient maintains oxygen saturation above 90% on 10L o2 via oxymask. ABG obtained.   PABLO Cerrato notified of results and status.  Order obtained for transfer to higher level.

## 2017-09-03 ENCOUNTER — APPOINTMENT (OUTPATIENT)
Dept: RADIOLOGY | Facility: MEDICAL CENTER | Age: 72
DRG: 246 | End: 2017-09-03
Attending: HOSPITALIST
Payer: MEDICARE

## 2017-09-03 PROBLEM — N28.9 RENAL INSUFFICIENCY: Status: ACTIVE | Noted: 2017-09-03

## 2017-09-03 PROBLEM — F41.9 ANXIETY: Status: ACTIVE | Noted: 2017-09-03

## 2017-09-03 LAB
ANION GAP SERPL CALC-SCNC: 7 MMOL/L (ref 0–11.9)
BUN SERPL-MCNC: 25 MG/DL (ref 8–22)
CALCIUM SERPL-MCNC: 8.7 MG/DL (ref 8.5–10.5)
CHLORIDE SERPL-SCNC: 109 MMOL/L (ref 96–112)
CO2 SERPL-SCNC: 20 MMOL/L (ref 20–33)
CREAT SERPL-MCNC: 1.78 MG/DL (ref 0.5–1.4)
EKG IMPRESSION: NORMAL
EKG IMPRESSION: NORMAL
ERYTHROCYTE [DISTWIDTH] IN BLOOD BY AUTOMATED COUNT: 57.5 FL (ref 35.9–50)
GFR SERPL CREATININE-BSD FRML MDRD: 38 ML/MIN/1.73 M 2
GLUCOSE SERPL-MCNC: 115 MG/DL (ref 65–99)
HCT VFR BLD AUTO: 33.7 % (ref 42–52)
HGB BLD-MCNC: 11.4 G/DL (ref 14–18)
MCH RBC QN AUTO: 32.1 PG (ref 27–33)
MCHC RBC AUTO-ENTMCNC: 33.8 G/DL (ref 33.7–35.3)
MCV RBC AUTO: 94.9 FL (ref 81.4–97.8)
PLATELET # BLD AUTO: 152 K/UL (ref 164–446)
PMV BLD AUTO: 11.1 FL (ref 9–12.9)
POTASSIUM SERPL-SCNC: 4.7 MMOL/L (ref 3.6–5.5)
RBC # BLD AUTO: 3.55 M/UL (ref 4.7–6.1)
SODIUM SERPL-SCNC: 136 MMOL/L (ref 135–145)
WBC # BLD AUTO: 14 K/UL (ref 4.8–10.8)

## 2017-09-03 PROCEDURE — A9270 NON-COVERED ITEM OR SERVICE: HCPCS | Performed by: INTERNAL MEDICINE

## 2017-09-03 PROCEDURE — 700102 HCHG RX REV CODE 250 W/ 637 OVERRIDE(OP): Performed by: INTERNAL MEDICINE

## 2017-09-03 PROCEDURE — 700105 HCHG RX REV CODE 258: Performed by: HOSPITALIST

## 2017-09-03 PROCEDURE — 93010 ELECTROCARDIOGRAM REPORT: CPT | Mod: 76 | Performed by: INTERNAL MEDICINE

## 2017-09-03 PROCEDURE — 80048 BASIC METABOLIC PNL TOTAL CA: CPT

## 2017-09-03 PROCEDURE — 700102 HCHG RX REV CODE 250 W/ 637 OVERRIDE(OP): Performed by: HOSPITALIST

## 2017-09-03 PROCEDURE — 94760 N-INVAS EAR/PLS OXIMETRY 1: CPT

## 2017-09-03 PROCEDURE — HZ2ZZZZ DETOXIFICATION SERVICES FOR SUBSTANCE ABUSE TREATMENT: ICD-10-PCS | Performed by: HOSPITALIST

## 2017-09-03 PROCEDURE — 700111 HCHG RX REV CODE 636 W/ 250 OVERRIDE (IP): Performed by: HOSPITALIST

## 2017-09-03 PROCEDURE — 71010 DX-CHEST-PORTABLE (1 VIEW): CPT

## 2017-09-03 PROCEDURE — 700111 HCHG RX REV CODE 636 W/ 250 OVERRIDE (IP): Performed by: INTERNAL MEDICINE

## 2017-09-03 PROCEDURE — 93005 ELECTROCARDIOGRAM TRACING: CPT | Performed by: HOSPITALIST

## 2017-09-03 PROCEDURE — 94640 AIRWAY INHALATION TREATMENT: CPT

## 2017-09-03 PROCEDURE — 99233 SBSQ HOSP IP/OBS HIGH 50: CPT | Performed by: HOSPITALIST

## 2017-09-03 PROCEDURE — 700101 HCHG RX REV CODE 250

## 2017-09-03 PROCEDURE — 700101 HCHG RX REV CODE 250: Performed by: INTERNAL MEDICINE

## 2017-09-03 PROCEDURE — A9270 NON-COVERED ITEM OR SERVICE: HCPCS | Performed by: HOSPITALIST

## 2017-09-03 PROCEDURE — 93005 ELECTROCARDIOGRAM TRACING: CPT | Performed by: INTERNAL MEDICINE

## 2017-09-03 PROCEDURE — 770020 HCHG ROOM/CARE - TELE (206)

## 2017-09-03 PROCEDURE — 85027 COMPLETE CBC AUTOMATED: CPT

## 2017-09-03 RX ORDER — GABAPENTIN 100 MG/1
100 CAPSULE ORAL 3 TIMES DAILY
Status: DISCONTINUED | OUTPATIENT
Start: 2017-09-03 | End: 2017-09-05

## 2017-09-03 RX ORDER — ALPRAZOLAM 0.5 MG/1
1 TABLET ORAL 4 TIMES DAILY PRN
Status: DISCONTINUED | OUTPATIENT
Start: 2017-09-03 | End: 2017-09-05 | Stop reason: HOSPADM

## 2017-09-03 RX ORDER — FOLIC ACID 1 MG/1
1 TABLET ORAL DAILY
Status: DISCONTINUED | OUTPATIENT
Start: 2017-09-04 | End: 2017-09-05 | Stop reason: HOSPADM

## 2017-09-03 RX ORDER — HALOPERIDOL 5 MG/ML
2-5 INJECTION INTRAMUSCULAR EVERY 4 HOURS PRN
Status: DISCONTINUED | OUTPATIENT
Start: 2017-09-03 | End: 2017-09-05 | Stop reason: HOSPADM

## 2017-09-03 RX ORDER — FUROSEMIDE 10 MG/ML
40 INJECTION INTRAMUSCULAR; INTRAVENOUS ONCE
Status: COMPLETED | OUTPATIENT
Start: 2017-09-03 | End: 2017-09-03

## 2017-09-03 RX ORDER — THIAMINE MONONITRATE (VIT B1) 100 MG
100 TABLET ORAL DAILY
Status: DISCONTINUED | OUTPATIENT
Start: 2017-09-04 | End: 2017-09-05 | Stop reason: HOSPADM

## 2017-09-03 RX ORDER — IPRATROPIUM BROMIDE AND ALBUTEROL SULFATE 2.5; .5 MG/3ML; MG/3ML
SOLUTION RESPIRATORY (INHALATION)
Status: COMPLETED
Start: 2017-09-03 | End: 2017-09-03

## 2017-09-03 RX ORDER — DIVALPROEX SODIUM 125 MG/1
250 CAPSULE, COATED PELLETS ORAL EVERY 8 HOURS
Status: DISCONTINUED | OUTPATIENT
Start: 2017-09-03 | End: 2017-09-05

## 2017-09-03 RX ORDER — SODIUM CHLORIDE 9 MG/ML
2000 INJECTION, SOLUTION INTRAVENOUS ONCE
Status: COMPLETED | OUTPATIENT
Start: 2017-09-03 | End: 2017-09-03

## 2017-09-03 RX ORDER — IPRATROPIUM BROMIDE AND ALBUTEROL SULFATE 2.5; .5 MG/3ML; MG/3ML
3 SOLUTION RESPIRATORY (INHALATION)
Status: DISCONTINUED | OUTPATIENT
Start: 2017-09-03 | End: 2017-09-05 | Stop reason: HOSPADM

## 2017-09-03 RX ADMIN — BUDESONIDE AND FORMOTEROL FUMARATE DIHYDRATE 2 PUFF: 160; 4.5 AEROSOL RESPIRATORY (INHALATION) at 22:20

## 2017-09-03 RX ADMIN — BUDESONIDE AND FORMOTEROL FUMARATE DIHYDRATE 2 PUFF: 160; 4.5 AEROSOL RESPIRATORY (INHALATION) at 08:58

## 2017-09-03 RX ADMIN — TIOTROPIUM BROMIDE 1 CAPSULE: 18 CAPSULE ORAL; RESPIRATORY (INHALATION) at 08:58

## 2017-09-03 RX ADMIN — PRASUGREL HYDROCHLORIDE 10 MG: 10 TABLET, FILM COATED ORAL at 08:56

## 2017-09-03 RX ADMIN — SPIRONOLACTONE 25 MG: 25 TABLET, FILM COATED ORAL at 08:56

## 2017-09-03 RX ADMIN — HALOPERIDOL LACTATE 2 MG: 5 INJECTION, SOLUTION INTRAMUSCULAR at 13:06

## 2017-09-03 RX ADMIN — AMPICILLIN SODIUM AND SULBACTAM SODIUM 3 G: 2; 1 INJECTION, POWDER, FOR SOLUTION INTRAMUSCULAR; INTRAVENOUS at 11:40

## 2017-09-03 RX ADMIN — DIVALPROEX SODIUM 125 MG: 125 CAPSULE, COATED PELLETS ORAL at 15:44

## 2017-09-03 RX ADMIN — FUROSEMIDE 40 MG: 10 INJECTION, SOLUTION INTRAMUSCULAR; INTRAVENOUS at 22:08

## 2017-09-03 RX ADMIN — IPRATROPIUM BROMIDE AND ALBUTEROL SULFATE 3 ML: .5; 3 SOLUTION RESPIRATORY (INHALATION) at 13:35

## 2017-09-03 RX ADMIN — STANDARDIZED SENNA CONCENTRATE AND DOCUSATE SODIUM 2 TABLET: 8.6; 5 TABLET, FILM COATED ORAL at 22:23

## 2017-09-03 RX ADMIN — DIVALPROEX SODIUM 250 MG: 125 CAPSULE, COATED PELLETS ORAL at 22:22

## 2017-09-03 RX ADMIN — TAMSULOSIN HYDROCHLORIDE 0.8 MG: 0.4 CAPSULE ORAL at 22:21

## 2017-09-03 RX ADMIN — GABAPENTIN 100 MG: 100 CAPSULE ORAL at 13:46

## 2017-09-03 RX ADMIN — AMPICILLIN SODIUM AND SULBACTAM SODIUM 3 G: 2; 1 INJECTION, POWDER, FOR SOLUTION INTRAMUSCULAR; INTRAVENOUS at 18:18

## 2017-09-03 RX ADMIN — METOPROLOL TARTRATE 25 MG: 25 TABLET, FILM COATED ORAL at 22:22

## 2017-09-03 RX ADMIN — METOPROLOL TARTRATE 25 MG: 25 TABLET, FILM COATED ORAL at 08:56

## 2017-09-03 RX ADMIN — ALPRAZOLAM 1 MG: 0.5 TABLET ORAL at 18:27

## 2017-09-03 RX ADMIN — DIVALPROEX SODIUM 125 MG: 125 CAPSULE, COATED PELLETS ORAL at 14:26

## 2017-09-03 RX ADMIN — GABAPENTIN 100 MG: 100 CAPSULE ORAL at 22:22

## 2017-09-03 RX ADMIN — LISINOPRIL 5 MG: 5 TABLET ORAL at 08:56

## 2017-09-03 RX ADMIN — IPRATROPIUM BROMIDE AND ALBUTEROL SULFATE 3 ML: .5; 3 SOLUTION RESPIRATORY (INHALATION) at 13:45

## 2017-09-03 RX ADMIN — ATORVASTATIN CALCIUM 80 MG: 80 TABLET, FILM COATED ORAL at 08:56

## 2017-09-03 RX ADMIN — AMPICILLIN SODIUM AND SULBACTAM SODIUM 3 G: 2; 1 INJECTION, POWDER, FOR SOLUTION INTRAMUSCULAR; INTRAVENOUS at 06:02

## 2017-09-03 RX ADMIN — SODIUM CHLORIDE 2000 ML: 9 INJECTION, SOLUTION INTRAVENOUS at 09:15

## 2017-09-03 RX ADMIN — ASPIRIN 81 MG: 81 TABLET ORAL at 08:56

## 2017-09-03 ASSESSMENT — ENCOUNTER SYMPTOMS
SHORTNESS OF BREATH: 1
WEAKNESS: 0
FEVER: 0
ABDOMINAL PAIN: 0
NERVOUS/ANXIOUS: 0
DEPRESSION: 0
VOMITING: 0
HEADACHES: 0
PALPITATIONS: 0
NAUSEA: 0
BACK PAIN: 0
SPEECH CHANGE: 0
STRIDOR: 0
EYE REDNESS: 0
DIZZINESS: 0
DIARRHEA: 0
COUGH: 0

## 2017-09-03 ASSESSMENT — PAIN SCALES - GENERAL
PAINLEVEL_OUTOF10: 0
PAINLEVEL_OUTOF10: 2
PAINLEVEL_OUTOF10: 0

## 2017-09-03 ASSESSMENT — LIFESTYLE VARIABLES
TOTAL SCORE: 0
TREMOR: NO TREMOR
AGITATION: NORMAL ACTIVITY
HEADACHE, FULLNESS IN HEAD: NOT PRESENT
ANXIETY: NO ANXIETY (AT EASE)
AUDITORY DISTURBANCES: NOT PRESENT
VISUAL DISTURBANCES: NOT PRESENT
TREMOR: NO TREMOR
ANXIETY: *
PAROXYSMAL SWEATS: NO SWEAT VISIBLE
TOTAL SCORE: 3
HEADACHE, FULLNESS IN HEAD: NOT PRESENT
PAROXYSMAL SWEATS: NO SWEAT VISIBLE
TOTAL SCORE: 0
TREMOR: NO TREMOR
ORIENTATION AND CLOUDING OF SENSORIUM: ORIENTED AND CAN DO SERIAL ADDITIONS
HEADACHE, FULLNESS IN HEAD: NOT PRESENT
PAROXYSMAL SWEATS: NO SWEAT VISIBLE
ORIENTATION AND CLOUDING OF SENSORIUM: ORIENTED AND CAN DO SERIAL ADDITIONS
ANXIETY: NO ANXIETY (AT EASE)
VISUAL DISTURBANCES: NOT PRESENT
NAUSEA AND VOMITING: NO NAUSEA AND NO VOMITING
NAUSEA AND VOMITING: NO NAUSEA AND NO VOMITING
AUDITORY DISTURBANCES: NOT PRESENT
NAUSEA AND VOMITING: NO NAUSEA AND NO VOMITING
ORIENTATION AND CLOUDING OF SENSORIUM: ORIENTED AND CAN DO SERIAL ADDITIONS
VISUAL DISTURBANCES: NOT PRESENT
AGITATION: SOMEWHAT MORE THAN NORMAL ACTIVITY
AUDITORY DISTURBANCES: NOT PRESENT
AGITATION: NORMAL ACTIVITY

## 2017-09-03 ASSESSMENT — COGNITIVE AND FUNCTIONAL STATUS - GENERAL
SUGGESTED CMS G CODE MODIFIER MOBILITY: CJ
HELP NEEDED FOR BATHING: A LITTLE
DRESSING REGULAR UPPER BODY CLOTHING: A LITTLE
WALKING IN HOSPITAL ROOM: A LITTLE
DAILY ACTIVITIY SCORE: 21
MOBILITY SCORE: 22
CLIMB 3 TO 5 STEPS WITH RAILING: A LITTLE
TOILETING: A LITTLE
SUGGESTED CMS G CODE MODIFIER DAILY ACTIVITY: CJ

## 2017-09-03 NOTE — PROGRESS NOTES
Report received from day shift RN and care assumed.  Pt resting in bed with no complaints, denies pain.  Spouse at bedside.  Head to toe assessment done.  All IVs patent with no problems.  Bed in lowest position, side rails up x 2, call light is in reach.  Hourly rounding instituted.  Will continue to monitor.

## 2017-09-03 NOTE — PROGRESS NOTES
[facility] Hospitalist Progress Note    Date of Service: 2017    Chief Complaint  71 y.o. male admitted 2017 with acute chest pain and found to have NSTEMI with trop of 11 on admit.  Taken to cath w/ stent to LAD and new systolic heart failure.    Interval Problem Update  Awake.  Chest pain mostly gone.  SPeech clear.  No distress.  WE discussed smoking cessation.      Consultants/Specialty  Cardiology    Disposition  Home.        Review of Systems   Constitutional: Negative for fever.   Eyes: Negative for redness.   Respiratory: Positive for shortness of breath. Negative for cough and stridor.    Cardiovascular: Positive for chest pain. Negative for palpitations and leg swelling.   Gastrointestinal: Negative for abdominal pain, diarrhea and nausea.   Genitourinary: Negative for dysuria and hematuria.   Musculoskeletal: Negative for back pain and joint pain.   Skin: Negative for rash.   Neurological: Negative for dizziness, speech change, weakness and headaches.   Psychiatric/Behavioral: Negative for depression. The patient is not nervous/anxious.       Physical Exam  Laboratory/Imaging   Hemodynamics  Temp (24hrs), Av.2 °C (97.2 °F), Min:35.7 °C (96.3 °F), Max:36.8 °C (98.3 °F)   Temperature: (!) 35.7 °C (96.3 °F)  Pulse  Av.3  Min: 63  Max: 116 Heart Rate (Monitored): 100  Blood Pressure : 104/66, NIBP: 112/77      Respiratory      Respiration: (!) 24, Pulse Oximetry: 96 %, O2 Daily Delivery Respiratory : OxyMask     Work Of Breathing / Effort: Mild  RUL Breath Sounds: Clear, RML Breath Sounds: Diminished, RLL Breath Sounds: Diminished, GISSELLE Breath Sounds: Clear, LLL Breath Sounds: Diminished    Fluids    Intake/Output Summary (Last 24 hours) at 17 1741  Last data filed at 17 0600   Gross per 24 hour   Intake              100 ml   Output              140 ml   Net              -40 ml       Nutrition  Orders Placed This Encounter   Procedures   • Diet Order     Standing Status:   Standing      Number of Occurrences:   1     Order Specific Question:   Diet:     Answer:   Cardiac [6]     Physical Exam   Constitutional: He is oriented to person, place, and time. He appears well-developed and well-nourished. No distress.   HENT:   Head: Normocephalic and atraumatic.   Nose: Nose normal.   Mouth/Throat: Oropharynx is clear and moist.   Eyes: Conjunctivae are normal. Right eye exhibits no discharge. Left eye exhibits no discharge. No scleral icterus.   Neck: No tracheal deviation present.   Cardiovascular: Normal rate, regular rhythm, normal heart sounds and intact distal pulses.    No murmur heard.  Pulmonary/Chest: Effort normal and breath sounds normal. No respiratory distress. He has no wheezes.   Abdominal: Soft. Bowel sounds are normal. He exhibits no distension. There is no tenderness.   Musculoskeletal: He exhibits no edema.   Neurological: He is alert and oriented to person, place, and time. No cranial nerve deficit.   Skin: Skin is warm. He is not diaphoretic.   Psychiatric: He has a normal mood and affect. His behavior is normal. Thought content normal.   Vitals reviewed.      Recent Labs      08/31/17   1800  09/01/17   0211 09/02/17   0605   WBC  12.0*  11.8*  8.4   RBC  4.16*  3.95*  3.68*   HEMOGLOBIN  13.7*  12.5*  12.0*   HEMATOCRIT  39.3*  36.8*  35.9*   MCV  94.5  93.2  97.6   MCH  32.9  31.6  32.6   MCHC  34.9  34.0  33.4*   RDW  56.1*  55.6*  58.4*   PLATELETCT  182  164  142*   MPV  10.9  11.0  11.2     Recent Labs      08/31/17   1800  09/01/17   0211  09/02/17   0605   SODIUM  134*  135  137   POTASSIUM  4.0  3.9  3.5*   CHLORIDE  103  103  113*   CO2  22  20  16*   GLUCOSE  111*  134*  112*   BUN  14  17  18   CREATININE  1.61*  1.64*  1.36   CALCIUM  9.6  8.7  7.1*     Recent Labs      08/31/17   1800  08/31/17   2220  09/01/17   0625  09/01/17   1216   APTT  30.3  29.7  149.1*  153.3*   INR  1.01   --    --    --      Recent Labs      08/31/17   1800   BNPBTYPENAT  683*               Assessment/Plan     * NSTEMI (non-ST elevated myocardial infarction) (CMS-HCC)   Assessment & Plan    Troponin 11, cardiology consulted - recommending heparin and ASA  Cath w/ stent to LAD  Effient, atorvastatin, metoprolol, asa  Echo with EF:35%  Cardiology consulting.  Smoking cessation discussed.        Hypokalemia   Assessment & Plan    Replace and recheck labs.  On spironolactone        Systolic heart failure (CMS-HCC)   Assessment & Plan    New and acute systolic failure  Prior Echo January 2017 with normal EF  Echo this visit with moderate pulm HTN w/ RVSP:60 and EF:35%  Medical management        Tobacco abuse   Assessment & Plan    -counseled for more than 10 minutes on tobacco cessation [id number]        COPD (chronic obstructive pulmonary disease) (CMS-HCC)   Assessment & Plan    He does not wear oxygen at home  Use oxygen currently due to CAD  No COPD exacerbation.  RT protocol and prn bronchodilators.        Nausea   Assessment & Plan    Improved this morning Anti-emetics prn        TD (acute kidney injury) (CMS-HCC)   Assessment & Plan    Improving  Continue IV fluids given angiogram with recent contrast        Chest pain   Assessment & Plan    Due to NSTEMI.    Evaluation of aortic aneurysm, with CT of the aorta and ultrasound of the abdomen showed decreased size in previously seen aneurysm.  Continue heparin, aspirin, statin  Had Angiogram with stent placement.  Medical management  Monitor vitals, EKG        Aorta aneurysm (CMS-HCC)   Assessment & Plan    Status post repair in February 2017.  Follows with HTN specialist Dr Michael Bloch.  CTA aorta negative for acute abnormality  ultrasound of the abdomen also showed decreased size of aneurysm            Reviewed items::  Radiology images reviewed, Labs reviewed and Medications reviewed  White catheter::  No White

## 2017-09-03 NOTE — PROGRESS NOTES
Report received from Ingrid in CICU and pt brought to floor in wheelchair by transporter. Pt denying pain on arrival, dyspnea on exertion noted, O2 sat 94% on 3L NC. Pt switched to oxymask per request. SR in 80's on cardiac monitor. /64, afebrile. Pt oriented to room and call light placed within reach.

## 2017-09-03 NOTE — ASSESSMENT & PLAN NOTE
- New diagnosis with acute systolic failure exacerbation  - ECHO in January 2017 with normal EF, currently with 35%

## 2017-09-03 NOTE — CARE PLAN
Problem: Safety  Goal: Will remain free from injury    Intervention: Provide assistance with mobility  Patient calls for assistance      Problem: Pain Management  Goal: Pain level will decrease to patient's comfort goal    Intervention: Follow pain managment plan developed in collaboration with patient and Interdisciplinary Team  Pt has no complaints of pain

## 2017-09-03 NOTE — PROGRESS NOTES
[facility]    Date of Service: 9/3/2017    Chief Complaint  71 y.o. male admitted 2017 with acute chest pain and found to have NSTEMI with trop of 11 on admit.  Taken to cath w/ stent to LAD and new systolic heart failure.    Interval Problem Update  Speech clear.  Wife at bedside.  Patient with some ongoing anxiety.  Alert and oriented and not tremulous but agitated at times. \"I think I'm having panic attacks.\"  States ativan given the other night made him altered and he is refusing it.  Xanax and haldol offered instead.  Care discussed with RN.      Consultants/Specialty  Cardiology    Disposition  Home.        Review of Systems   Constitutional: Negative for fever.   Eyes: Negative for redness.   Respiratory: Positive for shortness of breath. Negative for cough and stridor.    Cardiovascular: Positive for chest pain. Negative for palpitations and leg swelling.   Gastrointestinal: Negative for abdominal pain, diarrhea and nausea.   Genitourinary: Negative for dysuria and hematuria.   Musculoskeletal: Negative for back pain and joint pain.   Skin: Negative for rash.   Neurological: Negative for dizziness, speech change, weakness and headaches.   Psychiatric/Behavioral: Negative for depression. The patient is not nervous/anxious.       Physical Exam  Laboratory/Imaging   Hemodynamics  Temp (24hrs), Av.8 °C (98.3 °F), Min:36.4 °C (97.5 °F), Max:37.1 °C (98.8 °F)   Temperature: 36.9 °C (98.4 °F)  Pulse  Av.5  Min: 63  Max: 120 Heart Rate (Monitored): (!) 109  Blood Pressure : 107/62, NIBP: 110/76      Respiratory      Respiration: 20, Pulse Oximetry: 92 %, O2 Daily Delivery Respiratory : OxyMask     Given By:: Mouthpiece, Work Of Breathing / Effort: Moderate  RUL Breath Sounds: Clear, RML Breath Sounds: Diminished, RLL Breath Sounds: Diminished, GISSELLE Breath Sounds: Clear, LLL Breath Sounds: Diminished    Fluids    Intake/Output Summary (Last 24 hours) at 17  Last data " filed at 09/03/17 1000   Gross per 24 hour   Intake              303 ml   Output              180 ml   Net              123 ml       Nutrition  Orders Placed This Encounter   Procedures   • Diet Order     Standing Status:   Standing     Number of Occurrences:   1     Order Specific Question:   Diet:     Answer:   Cardiac [6]     Physical Exam   Constitutional: He is oriented to person, place, and time. He appears well-developed and well-nourished. He appears distressed.   HENT:   Head: Normocephalic and atraumatic.   Nose: Nose normal.   Mouth/Throat: Oropharynx is clear and moist.   Eyes: Conjunctivae and EOM are normal. Right eye exhibits no discharge. Left eye exhibits no discharge. No scleral icterus.   Neck: No tracheal deviation present.   Cardiovascular: Normal rate, regular rhythm, normal heart sounds and intact distal pulses.    No murmur heard.  Pulmonary/Chest: Effort normal and breath sounds normal. No accessory muscle usage. Tachypnea noted. No respiratory distress. He has no decreased breath sounds. He has no wheezes. He has no rhonchi.   Abdominal: Soft. Bowel sounds are normal. He exhibits no distension. There is no tenderness.   Musculoskeletal: He exhibits no edema.   Neurological: He is alert and oriented to person, place, and time. He displays no tremor. No cranial nerve deficit. Coordination normal.   Skin: Skin is warm. He is not diaphoretic.   Psychiatric: His mood appears anxious. His speech is rapid and/or pressured. He is agitated. Thought content is not delusional. Cognition and memory are normal.   Vitals reviewed.      Recent Labs      09/01/17   0211  09/02/17   0605  09/03/17   0530   WBC  11.8*  8.4  14.0*   RBC  3.95*  3.68*  3.55*   HEMOGLOBIN  12.5*  12.0*  11.4*   HEMATOCRIT  36.8*  35.9*  33.7*   MCV  93.2  97.6  94.9   MCH  31.6  32.6  32.1   MCHC  34.0  33.4*  33.8   RDW  55.6*  58.4*  57.5*   PLATELETCT  164  142*  152*   MPV  11.0  11.2  11.1     Recent Labs      09/01/17    0211  09/02/17   0605  09/03/17   0530   SODIUM  135  137  136   POTASSIUM  3.9  3.5*  4.7   CHLORIDE  103  113*  109   CO2  20  16*  20   GLUCOSE  134*  112*  115*   BUN  17  18  25*   CREATININE  1.64*  1.36  1.78*   CALCIUM  8.7  7.1*  8.7     Recent Labs      08/31/17   2220  09/01/17   0625  09/01/17   1216   APTT  29.7  149.1*  153.3*                  Assessment/Plan     * NSTEMI (non-ST elevated myocardial infarction) (CMS-HCC)   Assessment & Plan    Troponin 11, cardiology consulted - recommending heparin and ASA  Cath w/ stent to LAD  Effient, atorvastatin, metoprolol, asa  Echo with EF:35%  Cardiology consulting.  Smoking cessation discussed.        Anxiety   Assessment & Plan    Suspect EtOH w/d.  Patient refusing ativan.  Oral xanax offerred  Haldol prn (QTc okay on review of most recent EKG)  Valproic acid and gabapentin started.  Monitor.          Renal insufficiency   Assessment & Plan    IV fluids given he just had angiogram with contrast.  Monitor am BMP.        Alcohol use   Assessment & Plan    Thiamine and folate started        Hypokalemia   Assessment & Plan    Replace and recheck labs.  On spironolactone        Systolic heart failure (CMS-HCC)   Assessment & Plan    New and acute systolic failure  Prior Echo January 2017 with normal EF  Echo this visit with moderate pulm HTN w/ RVSP:60 and EF:35%  Medical management        Tobacco abuse   Assessment & Plan    Again smoking cessation strongly encouraged.  Patient aware of damages of smoking with risks of atheroscleorsis, cancer, and stroke/heart attack.        COPD (chronic obstructive pulmonary disease) (CMS-HCC)   Assessment & Plan    He does not wear oxygen at home  Use oxygen currently due to CAD  No COPD exacerbation.  RT protocol and prn bronchodilators.        Nausea   Assessment & Plan    No nausea 9/3am        TD (acute kidney injury) (CMS-HCC)   Assessment & Plan    Improving  Continue IV fluids given angiogram with recent contrast         Chest pain   Assessment & Plan    Due to NSTEMI.    Evaluation of aortic aneurysm, with CT of the aorta and ultrasound of the abdomen showed decreased size in previously seen aneurysm.  Continue heparin, aspirin, statin  Had Angiogram with stent placement.  Medical management  Monitor vitals, EKG        Aorta aneurysm (CMS-HCC)   Assessment & Plan    Status post repair in February 2017.  Follows with HTN specialist Dr Michael Bloch.  CTA aorta negative for acute abnormality  ultrasound of the abdomen also showed decreased size of aneurysm            Reviewed items::  Labs reviewed and Medications reviewed  White catheter::  No White

## 2017-09-04 PROBLEM — D72.829 LEUKOCYTOSIS: Status: ACTIVE | Noted: 2017-09-04

## 2017-09-04 LAB
ALBUMIN SERPL BCP-MCNC: 3.2 G/DL (ref 3.2–4.9)
ALBUMIN/GLOB SERPL: 1 G/DL
ALP SERPL-CCNC: 17 U/L (ref 30–99)
ALT SERPL-CCNC: 23 U/L (ref 2–50)
ANION GAP SERPL CALC-SCNC: 8 MMOL/L (ref 0–11.9)
AST SERPL-CCNC: 43 U/L (ref 12–45)
BILIRUB SERPL-MCNC: 0.7 MG/DL (ref 0.1–1.5)
BUN SERPL-MCNC: 20 MG/DL (ref 8–22)
CALCIUM SERPL-MCNC: 8.6 MG/DL (ref 8.5–10.5)
CHLORIDE SERPL-SCNC: 107 MMOL/L (ref 96–112)
CO2 SERPL-SCNC: 22 MMOL/L (ref 20–33)
CREAT SERPL-MCNC: 1.92 MG/DL (ref 0.5–1.4)
EKG IMPRESSION: NORMAL
ERYTHROCYTE [DISTWIDTH] IN BLOOD BY AUTOMATED COUNT: 59.4 FL (ref 35.9–50)
GFR SERPL CREATININE-BSD FRML MDRD: 35 ML/MIN/1.73 M 2
GLOBULIN SER CALC-MCNC: 3.1 G/DL (ref 1.9–3.5)
GLUCOSE SERPL-MCNC: 106 MG/DL (ref 65–99)
HCT VFR BLD AUTO: 35.1 % (ref 42–52)
HGB BLD-MCNC: 11.8 G/DL (ref 14–18)
MAGNESIUM SERPL-MCNC: 2.2 MG/DL (ref 1.5–2.5)
MCH RBC QN AUTO: 32.3 PG (ref 27–33)
MCHC RBC AUTO-ENTMCNC: 33.6 G/DL (ref 33.7–35.3)
MCV RBC AUTO: 96.2 FL (ref 81.4–97.8)
PLATELET # BLD AUTO: 157 K/UL (ref 164–446)
PMV BLD AUTO: 11.6 FL (ref 9–12.9)
POTASSIUM SERPL-SCNC: 4.1 MMOL/L (ref 3.6–5.5)
PROT SERPL-MCNC: 6.3 G/DL (ref 6–8.2)
RBC # BLD AUTO: 3.65 M/UL (ref 4.7–6.1)
SODIUM SERPL-SCNC: 137 MMOL/L (ref 135–145)
WBC # BLD AUTO: 9.4 K/UL (ref 4.8–10.8)

## 2017-09-04 PROCEDURE — 700111 HCHG RX REV CODE 636 W/ 250 OVERRIDE (IP): Performed by: HOSPITALIST

## 2017-09-04 PROCEDURE — 93010 ELECTROCARDIOGRAM REPORT: CPT | Performed by: INTERNAL MEDICINE

## 2017-09-04 PROCEDURE — A9270 NON-COVERED ITEM OR SERVICE: HCPCS | Performed by: HOSPITALIST

## 2017-09-04 PROCEDURE — A9270 NON-COVERED ITEM OR SERVICE: HCPCS | Performed by: NURSE PRACTITIONER

## 2017-09-04 PROCEDURE — 80053 COMPREHEN METABOLIC PANEL: CPT

## 2017-09-04 PROCEDURE — 700102 HCHG RX REV CODE 250 W/ 637 OVERRIDE(OP): Performed by: INTERNAL MEDICINE

## 2017-09-04 PROCEDURE — 700105 HCHG RX REV CODE 258

## 2017-09-04 PROCEDURE — 770020 HCHG ROOM/CARE - TELE (206)

## 2017-09-04 PROCEDURE — A9270 NON-COVERED ITEM OR SERVICE: HCPCS | Performed by: INTERNAL MEDICINE

## 2017-09-04 PROCEDURE — 700105 HCHG RX REV CODE 258: Performed by: HOSPITALIST

## 2017-09-04 PROCEDURE — 83735 ASSAY OF MAGNESIUM: CPT

## 2017-09-04 PROCEDURE — 700102 HCHG RX REV CODE 250 W/ 637 OVERRIDE(OP): Performed by: NURSE PRACTITIONER

## 2017-09-04 PROCEDURE — 700102 HCHG RX REV CODE 250 W/ 637 OVERRIDE(OP): Performed by: HOSPITALIST

## 2017-09-04 PROCEDURE — 99232 SBSQ HOSP IP/OBS MODERATE 35: CPT | Mod: 25 | Performed by: HOSPITALIST

## 2017-09-04 PROCEDURE — 36415 COLL VENOUS BLD VENIPUNCTURE: CPT

## 2017-09-04 PROCEDURE — 85027 COMPLETE CBC AUTOMATED: CPT

## 2017-09-04 PROCEDURE — 99406 BEHAV CHNG SMOKING 3-10 MIN: CPT | Performed by: HOSPITALIST

## 2017-09-04 PROCEDURE — 93005 ELECTROCARDIOGRAM TRACING: CPT | Performed by: INTERNAL MEDICINE

## 2017-09-04 RX ORDER — SODIUM CHLORIDE 9 MG/ML
INJECTION, SOLUTION INTRAVENOUS
Status: COMPLETED
Start: 2017-09-04 | End: 2017-09-04

## 2017-09-04 RX ORDER — TIOTROPIUM BROMIDE 18 UG/1
1 CAPSULE ORAL; RESPIRATORY (INHALATION) DAILY
Status: DISCONTINUED | OUTPATIENT
Start: 2017-09-05 | End: 2017-09-05 | Stop reason: HOSPADM

## 2017-09-04 RX ORDER — TIOTROPIUM BROMIDE 18 UG/1
1 CAPSULE ORAL; RESPIRATORY (INHALATION)
Status: DISCONTINUED | OUTPATIENT
Start: 2017-09-04 | End: 2017-09-04

## 2017-09-04 RX ORDER — CARVEDILOL 3.12 MG/1
3.12 TABLET ORAL 2 TIMES DAILY WITH MEALS
Status: DISCONTINUED | OUTPATIENT
Start: 2017-09-04 | End: 2017-09-05

## 2017-09-04 RX ORDER — NICOTINE 21 MG/24HR
14 PATCH, TRANSDERMAL 24 HOURS TRANSDERMAL
Status: DISCONTINUED | OUTPATIENT
Start: 2017-09-04 | End: 2017-09-05 | Stop reason: HOSPADM

## 2017-09-04 RX ADMIN — CARVEDILOL 3.12 MG: 3.12 TABLET, FILM COATED ORAL at 12:09

## 2017-09-04 RX ADMIN — GABAPENTIN 100 MG: 100 CAPSULE ORAL at 09:33

## 2017-09-04 RX ADMIN — Medication 100 MG: at 09:33

## 2017-09-04 RX ADMIN — ASPIRIN 81 MG: 81 TABLET ORAL at 09:33

## 2017-09-04 RX ADMIN — AMPICILLIN SODIUM AND SULBACTAM SODIUM 3 G: 2; 1 INJECTION, POWDER, FOR SOLUTION INTRAMUSCULAR; INTRAVENOUS at 12:09

## 2017-09-04 RX ADMIN — PRASUGREL HYDROCHLORIDE 10 MG: 10 TABLET, FILM COATED ORAL at 09:32

## 2017-09-04 RX ADMIN — SODIUM CHLORIDE 500 ML: 9 INJECTION, SOLUTION INTRAVENOUS at 23:55

## 2017-09-04 RX ADMIN — METOPROLOL TARTRATE 25 MG: 25 TABLET, FILM COATED ORAL at 09:31

## 2017-09-04 RX ADMIN — FOLIC ACID 1 MG: 1 TABLET ORAL at 09:33

## 2017-09-04 RX ADMIN — LISINOPRIL 5 MG: 5 TABLET ORAL at 09:33

## 2017-09-04 RX ADMIN — SPIRONOLACTONE 25 MG: 25 TABLET, FILM COATED ORAL at 09:31

## 2017-09-04 RX ADMIN — CARVEDILOL 3.12 MG: 3.12 TABLET, FILM COATED ORAL at 17:48

## 2017-09-04 RX ADMIN — GABAPENTIN 100 MG: 100 CAPSULE ORAL at 14:19

## 2017-09-04 RX ADMIN — GABAPENTIN 100 MG: 100 CAPSULE ORAL at 21:51

## 2017-09-04 RX ADMIN — AMPICILLIN SODIUM AND SULBACTAM SODIUM 3 G: 2; 1 INJECTION, POWDER, FOR SOLUTION INTRAMUSCULAR; INTRAVENOUS at 00:35

## 2017-09-04 RX ADMIN — DIVALPROEX SODIUM 250 MG: 125 CAPSULE, COATED PELLETS ORAL at 14:19

## 2017-09-04 RX ADMIN — BUDESONIDE AND FORMOTEROL FUMARATE DIHYDRATE 2 PUFF: 160; 4.5 AEROSOL RESPIRATORY (INHALATION) at 09:33

## 2017-09-04 RX ADMIN — DIVALPROEX SODIUM 250 MG: 125 CAPSULE, COATED PELLETS ORAL at 21:51

## 2017-09-04 RX ADMIN — AMPICILLIN SODIUM AND SULBACTAM SODIUM 3 G: 2; 1 INJECTION, POWDER, FOR SOLUTION INTRAMUSCULAR; INTRAVENOUS at 23:55

## 2017-09-04 RX ADMIN — BUDESONIDE AND FORMOTEROL FUMARATE DIHYDRATE 2 PUFF: 160; 4.5 AEROSOL RESPIRATORY (INHALATION) at 21:54

## 2017-09-04 RX ADMIN — AMPICILLIN SODIUM AND SULBACTAM SODIUM 3 G: 2; 1 INJECTION, POWDER, FOR SOLUTION INTRAMUSCULAR; INTRAVENOUS at 06:04

## 2017-09-04 RX ADMIN — AMPICILLIN SODIUM AND SULBACTAM SODIUM 3 G: 2; 1 INJECTION, POWDER, FOR SOLUTION INTRAMUSCULAR; INTRAVENOUS at 17:48

## 2017-09-04 RX ADMIN — TIOTROPIUM BROMIDE 1 CAPSULE: 18 CAPSULE ORAL; RESPIRATORY (INHALATION) at 12:25

## 2017-09-04 RX ADMIN — TAMSULOSIN HYDROCHLORIDE 0.8 MG: 0.4 CAPSULE ORAL at 21:51

## 2017-09-04 RX ADMIN — ATORVASTATIN CALCIUM 80 MG: 80 TABLET, FILM COATED ORAL at 09:31

## 2017-09-04 ASSESSMENT — PAIN SCALES - GENERAL
PAINLEVEL_OUTOF10: 0

## 2017-09-04 ASSESSMENT — LIFESTYLE VARIABLES
VISUAL DISTURBANCES: NOT PRESENT
PAROXYSMAL SWEATS: NO SWEAT VISIBLE
TREMOR: NO TREMOR
ANXIETY: MILDLY ANXIOUS
NAUSEA AND VOMITING: NO NAUSEA AND NO VOMITING
ANXIETY: MILDLY ANXIOUS
AGITATION: NORMAL ACTIVITY
AUDITORY DISTURBANCES: NOT PRESENT
TOTAL SCORE: 3
AGITATION: *
AUDITORY DISTURBANCES: NOT PRESENT
HEADACHE, FULLNESS IN HEAD: NOT PRESENT
HEADACHE, FULLNESS IN HEAD: NOT PRESENT
ORIENTATION AND CLOUDING OF SENSORIUM: ORIENTED AND CAN DO SERIAL ADDITIONS
TOTAL SCORE: 0
VISUAL DISTURBANCES: NOT PRESENT
TOTAL SCORE: 3
ANXIETY: MILDLY ANXIOUS
PAROXYSMAL SWEATS: NO SWEAT VISIBLE
VISUAL DISTURBANCES: NOT PRESENT
ORIENTATION AND CLOUDING OF SENSORIUM: ORIENTED AND CAN DO SERIAL ADDITIONS
ORIENTATION AND CLOUDING OF SENSORIUM: ORIENTED AND CAN DO SERIAL ADDITIONS
PAROXYSMAL SWEATS: NO SWEAT VISIBLE
PAROXYSMAL SWEATS: NO SWEAT VISIBLE
ANXIETY: MILDLY ANXIOUS
NAUSEA AND VOMITING: NO NAUSEA AND NO VOMITING
VISUAL DISTURBANCES: NOT PRESENT
VISUAL DISTURBANCES: NOT PRESENT
ORIENTATION AND CLOUDING OF SENSORIUM: ORIENTED AND CAN DO SERIAL ADDITIONS
NAUSEA AND VOMITING: NO NAUSEA AND NO VOMITING
TOTAL SCORE: 2
TREMOR: NO TREMOR
AGITATION: *
ANXIETY: NO ANXIETY (AT EASE)
HEADACHE, FULLNESS IN HEAD: NOT PRESENT
AUDITORY DISTURBANCES: NOT PRESENT
TREMOR: NO TREMOR
TREMOR: NO TREMOR
ANXIETY: MILDLY ANXIOUS
HEADACHE, FULLNESS IN HEAD: NOT PRESENT
TOTAL SCORE: 3
ORIENTATION AND CLOUDING OF SENSORIUM: ORIENTED AND CAN DO SERIAL ADDITIONS
AUDITORY DISTURBANCES: NOT PRESENT
NAUSEA AND VOMITING: NO NAUSEA AND NO VOMITING
NAUSEA AND VOMITING: NO NAUSEA AND NO VOMITING
TREMOR: NO TREMOR
AGITATION: *
HEADACHE, FULLNESS IN HEAD: NOT PRESENT
AGITATION: SOMEWHAT MORE THAN NORMAL ACTIVITY
PAROXYSMAL SWEATS: NO SWEAT VISIBLE
HEADACHE, FULLNESS IN HEAD: NOT PRESENT
ORIENTATION AND CLOUDING OF SENSORIUM: ORIENTED AND CAN DO SERIAL ADDITIONS
AUDITORY DISTURBANCES: NOT PRESENT
VISUAL DISTURBANCES: NOT PRESENT
PAROXYSMAL SWEATS: NO SWEAT VISIBLE
AUDITORY DISTURBANCES: NOT PRESENT
AGITATION: *
TREMOR: NO TREMOR
NAUSEA AND VOMITING: NO NAUSEA AND NO VOMITING
TOTAL SCORE: 4

## 2017-09-04 ASSESSMENT — ENCOUNTER SYMPTOMS
MYALGIAS: 0
DIARRHEA: 0
GASTROINTESTINAL NEGATIVE: 1
BRUISES/BLEEDS EASILY: 0
DEPRESSION: 0
SPUTUM PRODUCTION: 1
CHILLS: 0
MEMORY LOSS: 0
PHOTOPHOBIA: 0
NERVOUS/ANXIOUS: 0
FOCAL WEAKNESS: 0
EYES NEGATIVE: 1
LOSS OF CONSCIOUSNESS: 0
MUSCULOSKELETAL NEGATIVE: 1
NAUSEA: 0
HEADACHES: 0
DIZZINESS: 0
SHORTNESS OF BREATH: 0
ABDOMINAL PAIN: 0
TINGLING: 0
VOMITING: 0
WHEEZING: 0
COUGH: 1
COUGH: 0
FEVER: 0
HALLUCINATIONS: 0
SORE THROAT: 0
PALPITATIONS: 0

## 2017-09-04 NOTE — CARE PLAN
Problem: Safety  Goal: Will remain free from injury  Outcome: PROGRESSING AS EXPECTED  Pt free from falls and injuries this shift. Call light in reach. Bed alarm in place.    Problem: Psychosocial Needs:  Goal: Level of anxiety will decrease  Outcome: PROGRESSING SLOWER THAN EXPECTED  Pt having anxiety but refuses to take ativan. Haldol given with little relief of pt anxiety. Spoke with MD and new orders received. Wife at bedside. Will continue to monitor.    Problem: Pain Management  Goal: Pain level will decrease to patient's comfort goal  Outcome: PROGRESSING AS EXPECTED  Pt denied pain all shift, no prn medications given.

## 2017-09-04 NOTE — PROGRESS NOTES
Hospital Medicine Progress Note, Adult, Complex               Author: Alfie Tan Date & Time created: 9/3/2017  11:02 PM     Interval History:  Pt seen this evening slwo to improve has having sig SOB on the cammode pulled out IV     Review of Systems:  Review of Systems   Constitutional: Positive for malaise/fatigue.   Respiratory: Positive for shortness of breath.    Cardiovascular: Negative for chest pain.   Gastrointestinal: Negative for nausea and vomiting.   Neurological: Negative for dizziness.       Physical Exam:  Physical Exam  Mils distresswiht increased WOB  Chest rales  CV RRR  Abd S NT  Ext WWP    Labs:  Recent Labs      09/01/17   2140   ISTATAPH  7.436   ISTATAPCO2  27.1   ISTATAPO2  74   ISTATATCO2  19*   [id number]  96   ISTATARTHCO3  18.2   ISTATARTBE  -5*   ISTATTEMP  96.9 F   ISTATSPEC  Arterial   ISTATAPHTC  7.450   TGQDTCHI1XQ  70     Recent Labs      09/01/17 0211   TROPONINI  14.10*     Recent Labs      09/01/17   0211 09/02/17   0605  09/03/17   0530   SODIUM  135  137  136   POTASSIUM  3.9  3.5*  4.7   CHLORIDE  103  113*  109   CO2  20  16*  20   BUN  17  18  25*   CREATININE  1.64*  1.36  1.78*   MAGNESIUM   --   1.6   --    PHOSPHORUS   --   2.9   --    CALCIUM  8.7  7.1*  8.7     Recent Labs      09/01/17 0211 09/02/17   0605  09/03/17   0530   ALTSGPT  21   --    --    ASTSGOT  78*   --    --    ALKPHOSPHAT  18*   --    --    TBILIRUBIN  0.8   --    --    GLUCOSE  134*  112*  115*     Recent Labs      09/01/17   0211 09/01/17   0625  09/01/17   1216  09/02/17   0605  09/03/17   0530   RBC  3.95*   --    --   3.68*  3.55*   HEMOGLOBIN  12.5*   --    --   12.0*  11.4*   HEMATOCRIT  36.8*   --    --   35.9*  33.7*   PLATELETCT  164   --    --   142*  152*   APTT   --   149.1*  153.3*   --    --      Recent Labs      09/01/17   0211  09/02/17   0605  09/03/17   0530   WBC  11.8*  8.4  14.0*   NEUTSPOLYS  72.00  87.30*   --    LYMPHOCYTES  17.40*  9.00*   --    MONOCYTES   7.00  3.20   --    EOSINOPHILS  2.60  0.00   --    BASOPHILS  0.50  0.10   --    ASTSGOT  78*   --    --    ALTSGPT  21   --    --    ALKPHOSPHAT  18*   --    --    TBILIRUBIN  0.8   --    --            Hemodynamics:  Temp (24hrs), Av.8 °C (98.3 °F), Min:36.4 °C (97.5 °F), Max:37.1 °C (98.8 °F)  Temperature: 36.9 °C (98.4 °F)  Pulse  Av.5  Min: 63  Max: 120Heart Rate (Monitored): (!) 109  Blood Pressure : 107/62, NIBP: 110/76     Respiratory:    Respiration: 20, Pulse Oximetry: 92 %, O2 Daily Delivery Respiratory : OxyMask     Given By:: Mouthpiece, Work Of Breathing / Effort: Moderate  RUL Breath Sounds: Clear, RML Breath Sounds: Diminished, RLL Breath Sounds: Diminished, GISSELLE Breath Sounds: Clear, LLL Breath Sounds: Diminished  Fluids:    Intake/Output Summary (Last 24 hours) at 17 2302  Last data filed at 17 1000   Gross per 24 hour   Intake              303 ml   Output              180 ml   Net              123 ml     Weight: 80.2 kg (176 lb 12.9 oz)  GI/Nutrition:  Orders Placed This Encounter   Procedures   • Diet Order     Standing Status:   Standing     Number of Occurrences:   1     Order Specific Question:   Diet:     Answer:   Cardiac [6]     Medical Decision Making, by Problem:  Active Hospital Problems    Diagnosis   • *NSTEMI (non-ST elevated myocardial infarction) (CMS-HCC) [I21.4]   • Renal insufficiency [N28.9]   • Anxiety [F41.9]   • Systolic heart failure (CMS-HCC) [I50.20]   • Hypokalemia [E87.6]   • Alcohol use [Z78.9]   • Tobacco abuse [Z72.0]   • Aorta aneurysm (CMS-HCC) [I71.9]   • Chest pain [R07.9]   • TD (acute kidney injury) (CMS-HCC) [N17.9]   • Nausea [R11.0]   • COPD (chronic obstructive pulmonary disease) (CMS-HCC) [J44.9]     CAD   S/p PCI to LAD  effient  Aspirin  Reinforced importance to pt and wife  Metop  Ef 35  Add ace if BP allows  Added aldactone  Smoking cessation    CHF ischemic cardiomyopathy acute exacerbation in setting of low EF and MI  Lasix 40 IV  stat     NSVT ( ~15 beats on tele)  Monitor closely    likely has underlying sepsis on empiric abx at risk for aspiration with sig etoh      Monitor for etoh withdrawal     Transfer to floor if BP remains stable     It is my pleasure to participate in the care of Mr. Munoz.  Please do not hesitate to contact me with questions or concerns.     Alfie Tan MD PhD [facility]  Cardiologist [facility]    Quality-Core Measures

## 2017-09-04 NOTE — PROGRESS NOTES
The patient has had significant urine output and attempts to ambulate to void each time, without calling. He is not able to aim into the urinal and is unsafe to get up on his own and will not call despite education. A condom catheter was placed so the patient can safely void at ease.

## 2017-09-04 NOTE — CARE PLAN
Problem: Communication  Goal: The ability to communicate needs accurately and effectively will improve  Outcome: PROGRESSING AS EXPECTED  Reviewed plan of care for day, Pt verbalizes understanding, some reinforcement needed.

## 2017-09-04 NOTE — PROGRESS NOTES
Patient displaying signs of increased work of breathing. Accessory muscles in use. He jumped up to go to the restroom and his IV dislodged. The patient c/o SOB. Cardiologist came to see the patient and ordered 40 of lasix stat. Patient cleaned. New IV placed. Lasix administered. O2 turned up to 6 L oxymask (per pt request he does not like nasal cannula.) Inhaler administered. Inspiratory wheezes/ stridor noted. Respiratory therapist came to assess. Will contact RT if continues to decline.

## 2017-09-04 NOTE — CARE PLAN
Problem: Pain Management  Goal: Pain level will decrease to patient's comfort goal  Outcome: PROGRESSING AS EXPECTED  Pt. Denies pain at this time. He is in bed and resting. Will continue to monitor

## 2017-09-04 NOTE — PROGRESS NOTES
Monitor Summary: SR-ST  with rare PVCs and one isolated episode of 12sec run of PSVT during period of patient anxiety.    .16/.08/.34

## 2017-09-04 NOTE — ASSESSMENT & PLAN NOTE
- Resolved  - CXR with consolidation, possible aspiration  - Received 3 days of Zosyn, currently on Day 4 of unasyn, will stop at 7 days  - Afebrile and stable vital signs

## 2017-09-04 NOTE — PROGRESS NOTES
Unique [place] Fall Risk Assessment:     Last Known Fall: No falls  Mobility: Dizziness/generalized weakness, Immobilized/requires assist of one person  Medications: Cardiovascular or central nervous system meds, Diuretics  Mental Status/LOC/Awareness: Awake, alert, and oriented to date, place, and person  Toileting Needs: Use of assistive device (Bedside commode, bedpan, urinal)  Volume/Electrolyte Status: No problems  Communication/Sensory: Visual (Glasses)/hearing deficit  Behavior: Depression/anxiety, Impulsiveness  [facility] [place] Fall Risk Total: 18  Fall Risk Level: HIGH RISK    Universal Fall Precautions:  call light/belongings in reach, bed in low position and locked, wheelchairs and assistive devices out of sight, siderails up x 2, use non-slip footwear, adequate lighting, clutter free and spill free environment, educate on level of risk, educate to call for assistance    Fall Risk Level Interventions:    TRIAL (TELE 8, NEURO, MED ROXY 5) Moderate Fall Risk Interventions  Place yellow fall risk ID band on patient: verified  Provide patient/family education based on risk assessment : verified  Educate patient/family to call staff for assistance when getting out of bed: verified  Place fall precaution signage outside patient door: verified  Utilize bed/chair fall alarm: verifiedTRIAL (TELE 8, NEURO, MED ROXY 5) High Fall Risk Interventions  Place yellow fall risk ID band on patient: verified  Provide patient/family education based on risk assessment: completed  Educate patient/family to call staff for assistance when getting out of bed: completed  Place fall precaution signage outside patient door: verified  Place patient in room close to nursing station: verified  Utilize bed/chair fall alarm: verified  Notify charge of high risk for huddle: verified    Patient Specific Interventions:     Medication: review medications with patient and family  Mental Status/LOC/Awareness: reinforce falls education, check on  patient hourly, utilize bed/chair fall alarm, reinforce the use of call light and provide activity  Toileting: provide frquent toileting, monitor intake and output/use of appropriate interventions and instruct patient/family on the use of grab bars  Volume/Electrolyte Status: ensure patient remains hydrated and monitor abnormal lab values  Communication/Sensory: update plan of care on whiteboard, ensure proper positioning when transferrng/ambulating and ensure patient has glasses/contacts and hearing aids/dentures  Behavioral: encourage patient to voice feelings, engage patient in daily activities, administer medication as ordered, instruct/reinforce fall program rationale and use appropriate de-escalation techniques  Mobility: schedule physical activity throughout the day, provide comfort measures during transport, dangle prior to standing, utilize bed/chair fall alarm, ensure bed is locked and in lowest position and instruct patient to exit bed on their strongest side

## 2017-09-04 NOTE — PROGRESS NOTES
Monitor Summary     SR-ST  with (R)PVC, (R)Couplets, (R)PAC's and 6 beats of VTach    .18/.08/.32

## 2017-09-04 NOTE — PROGRESS NOTES
[facility] Hospitalist Progress Note    Date of Service: 2017    Chief Complaint  71 y.o. male presented 2017 with chest pain and admitted for NSTEMI.  Patient subsequently underwent cardiac catheterization and underwent stent placement to the LAD.  Patient also was diagnosed with new systolic CHF.    Interval Problem Update  Transferred from ICU overnight, denies chest pain and anxious to go home    Consultants/Specialty  Pulm, Cards    Disposition  Possible home tomorrow if cleared by specialists and once seen by PT        Review of Systems   Constitutional: Negative for chills and fever.   HENT: Negative for congestion and sore throat.    Eyes: Negative for photophobia.   Respiratory: Negative for cough, shortness of breath and wheezing.    Cardiovascular: Negative for chest pain and palpitations.   Gastrointestinal: Negative for abdominal pain, diarrhea, nausea and vomiting.   Genitourinary: Negative for dysuria.   Musculoskeletal: Negative for myalgias.   Skin: Negative.    Neurological: Negative for dizziness, tingling, focal weakness and headaches.   Psychiatric/Behavioral: Negative for depression and suicidal ideas.      Physical Exam  Laboratory/Imaging   Hemodynamics  Temp (24hrs), Av.7 °C (98.1 °F), Min:36.2 °C (97.2 °F), Max:37.1 °C (98.7 °F)   Temperature: 36.2 °C (97.2 °F)  Pulse  Av.3  Min: 63  Max: 120 Heart Rate (Monitored): (!) 109  Blood Pressure : 130/84      Respiratory      Respiration: 20, Pulse Oximetry: 96 %, O2 Daily Delivery Respiratory : OxyMask     Given By:: Mouthpiece, Work Of Breathing / Effort: Increased Work of Breathing;Accessory Muscle Use;Moderate  RUL Breath Sounds: Clear After Suction (cough), RML Breath Sounds: Diminished, RLL Breath Sounds: Inspiratory Wheezes, GISSELLE Breath Sounds: Clear After Suction (cough), LLL Breath Sounds: Inspiratory Wheezes    Fluids    Intake/Output Summary (Last 24 hours) at 17 0810  Last data filed at 17 0754   Gross per 24  hour   Intake              421 ml   Output             1815 ml   Net            -1394 ml       Nutrition  Orders Placed This Encounter   Procedures   • Diet Order     Standing Status:   Standing     Number of Occurrences:   1     Order Specific Question:   Diet:     Answer:   Cardiac [6]     Physical Exam   Constitutional: He is oriented to person, place, and time. No distress.   Disheveled, chronically ill appearing   HENT:   Head: Normocephalic and atraumatic.   Right Ear: External ear normal.   Left Ear: External ear normal.   Eyes: EOM are normal. Right eye exhibits no discharge. Left eye exhibits no discharge.   Neck: Neck supple. No JVD present.   Cardiovascular: Normal rate, regular rhythm and normal heart sounds.    Pulmonary/Chest: Effort normal and breath sounds normal. No respiratory distress. He exhibits no tenderness.   Abdominal: Soft. Bowel sounds are normal. He exhibits no distension. There is no tenderness.   Musculoskeletal: He exhibits no edema.   Neurological: He is alert and oriented to person, place, and time. No cranial nerve deficit.   Skin: Skin is dry. He is not diaphoretic. No erythema.   Psychiatric: He has a normal mood and affect. His behavior is normal.   Nursing note and vitals reviewed.      Recent Labs      09/02/17   0605  09/03/17   0530  09/04/17   0146   WBC  8.4  14.0*  9.4   RBC  3.68*  3.55*  3.65*   HEMOGLOBIN  12.0*  11.4*  11.8*   HEMATOCRIT  35.9*  33.7*  35.1*   MCV  97.6  94.9  96.2   MCH  32.6  32.1  32.3   MCHC  33.4*  33.8  33.6*   RDW  58.4*  57.5*  59.4*   PLATELETCT  142*  152*  157*   MPV  11.2  11.1  11.6     Recent Labs      09/02/17   0605  09/03/17   0530  09/04/17   0146   SODIUM  137  136  137   POTASSIUM  3.5*  4.7  4.1   CHLORIDE  113*  109  107   CO2  16*  20  22   GLUCOSE  112*  115*  106*   BUN  18  25*  20   CREATININE  1.36  1.78*  1.92*   CALCIUM  7.1*  8.7  8.6     Recent Labs      09/01/17   1216   APTT  153.3*                  Assessment/Plan      * NSTEMI (non-ST elevated myocardial infarction) (CMS-HCC)- (present on admission)   Assessment & Plan    - Troponin peaked at 14.39 and then trended down  - s/p cardiac catheterization with stent to LAD  - Cardiology following, discussed with Dr. Jen Killian  - Most recent ECHO with EF 35%  - Continue Effient, atorvastatin, metoprolol, ASA        Chest pain- (present on admission)   Assessment & Plan    - Resolved, treatment as noted        Leukocytosis   Assessment & Plan    - Resolved  - CXR with consolidation, possible aspiration  - Received 3 days of Zosyn, currently on Day 4 of unasyn, will stop at 7 days  - Afebrile and stable vital signs        Anxiety   Assessment & Plan    - PRN xanax and haldol        Alcohol use   Assessment & Plan    - No clinical evidence of withdrawal thus far, will continue to monitor  - OK to continue Valproic acid and gabapentin but plan to begin wean tomorrow if still no evidence of withdrawal        Hypokalemia   Assessment & Plan    - Resolved, monitor        Systolic heart failure (CMS-HCC)- (present on admission)   Assessment & Plan    - New diagnosis with acute systolic failure exacerbation  - ECHO in January 2017 with normal EF, currently with 35%        COPD (chronic obstructive pulmonary disease) (CMS-HCC)- (present on admission)   Assessment & Plan    - Still on 4L, unable to wean off, not on O2 at home  - Already on symbicort, will add spiriva to medically optimize  - Greater than 3 minutes spent at bedside and tobacco cessation counseling, will start nicotine patch, patient is not yet in the precontemplation stages for quitting, we'll continue to encourage  - Continue RT protocol        Nausea- (present on admission)   Assessment & Plan    - Resolved, symptomatic management        TD (acute kidney injury) (CMS-HCC)- (present on admission)   Assessment & Plan    - Improved but then with mild worsening overnight  - Reports good UOP  - Hold off on IVF and encourage  PO (EF 35%)  - May need to hold nephrotoxic meds if worsens  - Monitor        Aorta aneurysm (CMS-HCC)- (present on admission)   Assessment & Plan    - Chronic, stable  - s/p repair February 2017  - Repeat CTA aorta negative for acute abnormality  - Outpt follow up with Dr. Bloch when medically stable            Reviewed items::  Medications reviewed and Labs reviewed  Antibiotics:  Treating active infection/contamination beyond 24 hours perioperative coverage

## 2017-09-04 NOTE — PROGRESS NOTES
Unique Newby Fall Risk Assessment:     Last Known Fall: No falls  Mobility: Dizziness/generalized weakness  Medications: Cardiovascular or central nervous system meds  Mental Status/LOC/Awareness: Awake, alert, and oriented to date, place, and person  Toileting Needs: Use of assistive device (Bedside commode, bedpan, urinal)  Volume/Electrolyte Status: Use of IV fluids/tube feeds  Communication/Sensory: Visual (Glasses)/hearing deficit  Behavior: Depression/anxiety  Unique [place]by Fall Risk Total: 11  Fall Risk Level: MODERATE RISK    Universal Fall Precautions:  call light/belongings in reach, bed in low position and locked, siderails up x 2, use non-slip footwear, adequate lighting, clutter free and spill free environment, educate on level of risk, educate to call for assistance    Fall Risk Level Interventions:    TRIAL (TELE 8, NEURO, MED ROXY 5) Moderate Fall Risk Interventions  Place yellow fall risk ID band on patient: verified  Provide patient/family education based on risk assessment : verified  Educate patient/family to call staff for assistance when getting out of bed: verified  Place fall precaution signage outside patient door: verified  Utilize bed/chair fall alarm: verified     Patient Specific Interventions:     Medication: review medications with patient and family, limit combination of prn medications and assess need for orthostatic hypotension evaluation  Mental Status/LOC/Awareness: reorient patient, reinforce falls education, encourage family to stay with patient, check on patient hourly and utilize bed/chair fall alarm  Toileting: provide frquent toileting, instruct male patients prone to dizziness to void while sitting, instruct patient/family on the use of grab bars, instruct patient/family on the need to call for assistance when toileting and do not leave patient unattended in bathroom/refer to toileting scripting  Volume/Electrolyte Status: ensure patient remains hydrated, monitor blood  sugars and maintain appropriate blood sugar levels if diabetic, administer medications as ordered for nausea and vomiting and teach patients to dangle before rising if hypotensive  Communication/Sensory: update plan of care on whiteboard  Behavioral: encourage patient to voice feelings, engage patient in daily activities, administer medication as ordered, instruct/reinforce fall program rationale and encourage family to stay with impulsive patients  Mobility: schedule physical activity throughout the day, dangle prior to standing, utilize bed/chair fall alarm, ensure bed is locked and in lowest position, provide appropriate assistive device and instruct patient to exit bed on their strongest side

## 2017-09-04 NOTE — PROGRESS NOTES
Cardiology Progress Note               Author: Jen Killian Date & Time created: 2017  10:14 AM     Interval History:    Wants to go home    Review of Systems   Constitutional: Negative for fever and malaise/fatigue.   Eyes: Negative.    Respiratory: Negative for cough and shortness of breath.    Cardiovascular: Negative for chest pain and palpitations.   Gastrointestinal: Negative.    Musculoskeletal: Negative.    Neurological: Negative for dizziness, focal weakness and loss of consciousness.   Endo/Heme/Allergies: Does not bruise/bleed easily.   Psychiatric/Behavioral: Negative for depression, hallucinations, memory loss and suicidal ideas. The patient is not nervous/anxious.    All other systems reviewed and are negative.      Physical Exam   Eyes: EOM are normal. Pupils are equal, round, and reactive to light. No scleral icterus.   Neck: No JVD present.   Cardiovascular: Regular rhythm and intact distal pulses.    Mild sinus tach   Pulmonary/Chest: Breath sounds normal.   Abdominal: Bowel sounds are normal.   Neurological: He is alert. He exhibits normal muscle tone. Coordination normal.   Skin: Skin is warm and dry. No rash noted.   Vitals reviewed.      Hemodynamics:  Temp (24hrs), Av.7 °C (98 °F), Min:36.2 °C (97.2 °F), Max:37.1 °C (98.7 °F)  Temperature: 36.4 °C (97.5 °F)  Pulse  Av.1  Min: 63  Max: 120  Blood Pressure : 129/70     Respiratory:    Respiration: 17, Pulse Oximetry: 92 %, O2 Daily Delivery Respiratory : OxyMask     Given By:: Mouthpiece, Work Of Breathing / Effort: Increased Work of Breathing;Accessory Muscle Use;Moderate  RUL Breath Sounds: Clear After Suction (cough), RML Breath Sounds: Diminished, RLL Breath Sounds: Inspiratory Wheezes, GISSELLE Breath Sounds: Clear After Suction (cough), LLL Breath Sounds: Inspiratory Wheezes  Fluids:  Date 17 0700 - 17 0659   Shift 9645-8136 5028-7310 2283-7752 24 Hour Total   I  N  T  A  K  E   Shift Total        O  U  T  P  U  T   Urine 525   525    Shift Total 525   525   Weight (kg) 80.2 80.2 80.2 80.2       Weight: 80.2 kg (176 lb 12.9 oz)  GI/Nutrition:  Orders Placed This Encounter   Procedures   • Diet Order     Standing Status:   Standing     Number of Occurrences:   1     Order Specific Question:   Diet:     Answer:   Cardiac [6]     Lab Results:  Recent Labs      09/02/17   0605 09/03/17   0530  09/04/17   0146   WBC  8.4  14.0*  9.4   RBC  3.68*  3.55*  3.65*   HEMOGLOBIN  12.0*  11.4*  11.8*   HEMATOCRIT  35.9*  33.7*  35.1*   MCV  97.6  94.9  96.2   MCH  32.6  32.1  32.3   MCHC  33.4*  33.8  33.6*   RDW  58.4*  57.5*  59.4*   PLATELETCT  142*  152*  157*   MPV  11.2  11.1  11.6     Recent Labs      09/02/17   0605  09/03/17   0530  09/04/17   0146   SODIUM  137  136  137   POTASSIUM  3.5*  4.7  4.1   CHLORIDE  113*  109  107   CO2  16*  20  22   GLUCOSE  112*  115*  106*   BUN  18  25*  20   CREATININE  1.36  1.78*  1.92*   CALCIUM  7.1*  8.7  8.6     Recent Labs      09/01/17   1216   APTT  153.3*                     Medical Decision Making, by Problem:  Active Hospital Problems    Diagnosis   • *NSTEMI (non-ST elevated myocardial infarction) (CMS-HCC) [I21.4]   • Renal insufficiency [N28.9]   • Anxiety [F41.9]   • Systolic heart failure (CMS-HCC) [I50.20]   • Hypokalemia [E87.6]   • Alcohol use [Z78.9]   • Tobacco abuse [Z72.0]   • Aorta aneurysm (CMS-HCC) [I71.9]   • Chest pain [R07.9]   • TD (acute kidney injury) (CMS-HCC) [N17.9]   • Nausea [R11.0]   • COPD (chronic obstructive pulmonary disease) (CMS-HCC) [J44.9]       Plan:    70yo from [place], admit 8/31sent by doctor office for CP, hx of recent abd dissection repair     CHF, likely acute on chronic with recent large nonstemi.  EF 60 on echo last January.  Coreg & acei on board, no evidence of volume overload.    NSTEMI.  Recent low risk PET 1/2017.  With +troponin & CP syndrome to cath 9/1.  Remedy UTE to suboccluded pLAD.  DAPT &  statin.    Social, pt intent on going home.  Would advise ama - still tachycardic and hypoxic - ef 35% on echo    D/w RN & pt and hospitalist        Quality-Core Measures

## 2017-09-04 NOTE — PROGRESS NOTES
Assumed care at 1900, bedside report received from day shift RN. Patient is AOx4 with signs of anxiety. Pt. Is ST on the monitor. Initial assessment completed, orders reviewed, call light within reach, bed alarm in use, and hourly rounding in place. POC addressed with patient, no additional questions at this time.

## 2017-09-04 NOTE — PROGRESS NOTES
Cardiology Progress Note               Author: Riana Cabral Date & Time created: 9/4/2017  9:43 AM     Interval History:  71 year old with hx of endovascular abdominal aortic repair 2/17, complicated by ischemic bowel, ETOH use, hypertension, who presented with chest pain.  Cardiology was consulted for trop up to 11 (trop peaked at 14.39)  35% on EF, 60% 1/2017 9/4:  Pt remains on 5L mask  Wants to go home  HR 's    Creatine up to 1.92 from 1.78    Echocardiography Laboratory    CONCLUSIONS  Left ventricular ejection fraction is visually estimated to be 35%.    Wall motion abnormalities as described.  There is eccentric, posteriorly directed mitral regurgitation which is   moderate to severe.  Moderate tricuspid regurgitation.  Right ventricular systolic pressure is estimated to be 60 mmHg      Review of Systems   Constitutional: Negative for chills, fever and malaise/fatigue.   HENT: Negative for sore throat.    Respiratory: Positive for cough and sputum production. Negative for shortness of breath.    Cardiovascular: Negative for chest pain and palpitations.   Gastrointestinal: Negative for diarrhea, nausea and vomiting.   Musculoskeletal: Negative for myalgias.   Skin: Negative for rash.   Neurological: Negative for dizziness.       Physical Exam   Constitutional: He is oriented to person, place, and time. He appears well-developed and well-nourished. No distress.   HENT:   Head: Normocephalic and atraumatic.   Eyes: Conjunctivae are normal.   Cardiovascular: An irregularly irregular rhythm present. Tachycardia present.    NSR with PAC and PVT   Pulmonary/Chest: Effort normal. He has decreased breath sounds. He has rhonchi in the right lower field. He has rales. He exhibits no tenderness.   Musculoskeletal: Normal range of motion. He exhibits no edema.   Neurological: He is alert and oriented to person, place, and time.   Skin: Skin is warm and dry. He is not diaphoretic.   Psychiatric: He has a normal  mood and affect. His behavior is normal. Thought content normal.   Nursing note and vitals reviewed.      Hemodynamics:  Temp (24hrs), Av.7 °C (98 °F), Min:36.2 °C (97.2 °F), Max:37.1 °C (98.7 °F)  Temperature: 36.4 °C (97.5 °F)  Pulse  Av.1  Min: 63  Max: 120  Blood Pressure : 129/70     Respiratory:    Respiration: 17, Pulse Oximetry: 92 %, O2 Daily Delivery Respiratory : OxyMask     Given By:: Mouthpiece, Work Of Breathing / Effort: Increased Work of Breathing;Accessory Muscle Use;Moderate  RUL Breath Sounds: Clear After Suction (cough), RML Breath Sounds: Diminished, RLL Breath Sounds: Inspiratory Wheezes, GISSELLE Breath Sounds: Clear After Suction (cough), LLL Breath Sounds: Inspiratory Wheezes  Fluids:  Date 17 0700 - 17 0659   Shift 5225-3067 6190-8793 0099-2179 24 Hour Total   I  N  T  A  K  E   Shift Total       O  U  T  P  U  T   Urine 525   525    Shift Total 525   525   Weight (kg) 80.2 80.2 80.2 80.2       Weight: 80.2 kg (176 lb 12.9 oz)  GI/Nutrition:  Orders Placed This Encounter   Procedures   • Diet Order     Standing Status:   Standing     Number of Occurrences:   1     Order Specific Question:   Diet:     Answer:   Cardiac [6]     Lab Results:  Recent Labs      17   0617   0517   0146   WBC  8.4  14.0*  9.4   RBC  3.68*  3.55*  3.65*   HEMOGLOBIN  12.0*  11.4*  11.8*   HEMATOCRIT  35.9*  33.7*  35.1*   MCV  97.6  94.9  96.2   MCH  32.6  32.1  32.3   MCHC  33.4*  33.8  33.6*   RDW  58.4*  57.5*  59.4*   PLATELETCT  142*  152*  157*   MPV  11.2  11.1  11.6     Recent Labs      17   0617   0530  17   0146   SODIUM  137  136  137   POTASSIUM  3.5*  4.7  4.1   CHLORIDE  113*  109  107   CO2  16*  20  22   GLUCOSE  112*  115*  106*   BUN  18  25*  20   CREATININE  1.36  1.78*  1.92*   CALCIUM  7.1*  8.7  8.6     Recent Labs      17   1216   APTT  153.3*                     Medical Decision Making, by Problem:  Active Hospital  Problems    Diagnosis   • *NSTEMI (non-ST elevated myocardial infarction) (CMS-HCC) [I21.4]   • Renal insufficiency [N28.9]   • Anxiety [F41.9]   • Systolic heart failure (CMS-HCC) [I50.20]   • Hypokalemia [E87.6]   • Alcohol use [Z78.9]   • Tobacco abuse [Z72.0]   • Aorta aneurysm (CMS-HCC) [I71.9]   • Chest pain [R07.9]   • TD (acute kidney injury) (CMS-HCC) [N17.9]   • Nausea [R11.0]   • COPD (chronic obstructive pulmonary disease) (CMS-HCC) [J44.9]       Plan:  NSTEMI:  EF 35 %    Change to coreg 3.125  Hold spirolactone, recheck creatine  Continue asa  Continue effient  Continue lipitor    Case and plan with Dr. Killian  Monitor resp status, ?  pneumonia on chest xray               Quality-Core Measures

## 2017-09-04 NOTE — PROGRESS NOTES
Patient denying pain. He still appears to have labored breathing but accessory muscle use has stopped and he is sating at 95% on 3L oxymask. He still has noisy breathing and some wheezing but is sleeping at this time. He is urinating appropriately considering diuretics.

## 2017-09-04 NOTE — PROGRESS NOTES
Unique Newby Fall Risk Assessment:     Last Known Fall: No falls  Mobility: Dizziness/generalized weakness, Immobilized/requires assist of one person  Medications: Cardiovascular or central nervous system meds, Diuretics  Mental Status/LOC/Awareness: Awake, alert, and oriented to date, place, and person  Toileting Needs: Use of assistive device (Bedside commode, bedpan, urinal)  Volume/Electrolyte Status: No problems  Communication/Sensory: Visual (Glasses)/hearing deficit  Behavior: Depression/anxiety, Impulsiveness  Unique Newby Fall Risk Total: 18  Fall Risk Level: HIGH RISK    Universal Fall Precautions:  call light/belongings in reach, bed in low position and locked, siderails up x 2, use non-slip footwear, adequate lighting, clutter free and spill free environment, educate to call for assistance, educate on level of risk, wheelchairs and assistive devices out of sight    Fall Risk Level Interventions:    TRIAL (TELE 8, NEURO, MED ROXY 5) Moderate Fall Risk Interventions  Place yellow fall risk ID band on patient: verified  Provide patient/family education based on risk assessment : verified  Educate patient/family to call staff for assistance when getting out of bed: verified  Place fall precaution signage outside patient door: verified  Utilize bed/chair fall alarm: verifiedTRIAL (TELE 8, NEURO, MED ROXY 5) High Fall Risk Interventions  Place yellow fall risk ID band on patient: verified  Provide patient/family education based on risk assessment: completed  Educate patient/family to call staff for assistance when getting out of bed: completed  Place fall precaution signage outside patient door: completed  Place patient in room close to nursing station: verified  Utilize bed/chair fall alarm: completed  Notify charge of high risk for huddle: completed    Patient Specific Interventions:     Medication: review medications with patient and family, assess for medications that can be discontinued or dosage  decreased, limit combination of prn medications and provide patients who received diuretics/laxatives frequent toileting  Mental Status/LOC/Awareness: reinforce falls education, check on patient hourly, utilize bed/chair fall alarm and reinforce the use of call light  Toileting: consider obtaining elevated toilet seat or bedside commode (BSC), provide frquent toileting, monitor intake and output/use of appropriate interventions, instruct male patients prone to dizziness to void while sitting, instruct patient/family on the use of grab bars and instruct patient/family on the need to call for assistance when toileting  Volume/Electrolyte Status: ensure patient remains hydrated and monitor abnormal lab values  Communication/Sensory: update plan of care on whiteboard, ensure proper positioning when transferrng/ambulating and ensure patient has glasses/contacts and hearing aids/dentures  Behavioral: encourage patient to voice feelings, administer medication as ordered and instruct/reinforce fall program rationale  Mobility: dangle prior to standing, utilize bed/chair fall alarm, ensure bed is locked and in lowest position, provide appropriate assistive device and instruct patient to exit bed on their strongest side

## 2017-09-04 NOTE — ASSESSMENT & PLAN NOTE
- No clinical evidence of withdrawal thus far, will continue to monitor  - OK to continue Valproic acid and gabapentin but plan to begin wean tomorrow if still no evidence of withdrawal

## 2017-09-05 ENCOUNTER — PATIENT OUTREACH (OUTPATIENT)
Dept: HEALTH INFORMATION MANAGEMENT | Facility: OTHER | Age: 72
End: 2017-09-05

## 2017-09-05 VITALS
WEIGHT: 161.82 LBS | RESPIRATION RATE: 16 BRPM | DIASTOLIC BLOOD PRESSURE: 70 MMHG | OXYGEN SATURATION: 93 % | SYSTOLIC BLOOD PRESSURE: 119 MMHG | TEMPERATURE: 97.8 F | BODY MASS INDEX: 24.52 KG/M2 | HEART RATE: 67 BPM | HEIGHT: 68 IN

## 2017-09-05 LAB
ANION GAP SERPL CALC-SCNC: 7 MMOL/L (ref 0–11.9)
BUN SERPL-MCNC: 21 MG/DL (ref 8–22)
CALCIUM SERPL-MCNC: 8.2 MG/DL (ref 8.5–10.5)
CHLORIDE SERPL-SCNC: 107 MMOL/L (ref 96–112)
CO2 SERPL-SCNC: 22 MMOL/L (ref 20–33)
CREAT SERPL-MCNC: 1.91 MG/DL (ref 0.5–1.4)
GFR SERPL CREATININE-BSD FRML MDRD: 35 ML/MIN/1.73 M 2
GLUCOSE SERPL-MCNC: 98 MG/DL (ref 65–99)
MAGNESIUM SERPL-MCNC: 2.1 MG/DL (ref 1.5–2.5)
POTASSIUM SERPL-SCNC: 3.7 MMOL/L (ref 3.6–5.5)
SODIUM SERPL-SCNC: 136 MMOL/L (ref 135–145)

## 2017-09-05 PROCEDURE — 99239 HOSP IP/OBS DSCHRG MGMT >30: CPT | Performed by: HOSPITALIST

## 2017-09-05 PROCEDURE — A9270 NON-COVERED ITEM OR SERVICE: HCPCS | Performed by: INTERNAL MEDICINE

## 2017-09-05 PROCEDURE — G8979 MOBILITY GOAL STATUS: HCPCS | Mod: CI

## 2017-09-05 PROCEDURE — 83735 ASSAY OF MAGNESIUM: CPT

## 2017-09-05 PROCEDURE — 700102 HCHG RX REV CODE 250 W/ 637 OVERRIDE(OP): Performed by: HOSPITALIST

## 2017-09-05 PROCEDURE — G8978 MOBILITY CURRENT STATUS: HCPCS | Mod: CI

## 2017-09-05 PROCEDURE — A9270 NON-COVERED ITEM OR SERVICE: HCPCS | Performed by: STUDENT IN AN ORGANIZED HEALTH CARE EDUCATION/TRAINING PROGRAM

## 2017-09-05 PROCEDURE — 36415 COLL VENOUS BLD VENIPUNCTURE: CPT

## 2017-09-05 PROCEDURE — A9270 NON-COVERED ITEM OR SERVICE: HCPCS | Performed by: HOSPITALIST

## 2017-09-05 PROCEDURE — A9270 NON-COVERED ITEM OR SERVICE: HCPCS | Performed by: NURSE PRACTITIONER

## 2017-09-05 PROCEDURE — 700102 HCHG RX REV CODE 250 W/ 637 OVERRIDE(OP): Performed by: NURSE PRACTITIONER

## 2017-09-05 PROCEDURE — 97161 PT EVAL LOW COMPLEX 20 MIN: CPT

## 2017-09-05 PROCEDURE — 700102 HCHG RX REV CODE 250 W/ 637 OVERRIDE(OP): Performed by: INTERNAL MEDICINE

## 2017-09-05 PROCEDURE — 700105 HCHG RX REV CODE 258: Performed by: HOSPITALIST

## 2017-09-05 PROCEDURE — 700111 HCHG RX REV CODE 636 W/ 250 OVERRIDE (IP): Performed by: HOSPITALIST

## 2017-09-05 PROCEDURE — 700102 HCHG RX REV CODE 250 W/ 637 OVERRIDE(OP): Performed by: STUDENT IN AN ORGANIZED HEALTH CARE EDUCATION/TRAINING PROGRAM

## 2017-09-05 PROCEDURE — G8980 MOBILITY D/C STATUS: HCPCS | Mod: CI

## 2017-09-05 PROCEDURE — 80048 BASIC METABOLIC PNL TOTAL CA: CPT

## 2017-09-05 RX ORDER — LANOLIN ALCOHOL/MO/W.PET/CERES
100 CREAM (GRAM) TOPICAL DAILY
Qty: 30 TAB | Refills: 0 | Status: SHIPPED | OUTPATIENT
Start: 2017-09-05 | End: 2017-09-13 | Stop reason: SDUPTHER

## 2017-09-05 RX ORDER — LISINOPRIL 5 MG/1
5 TABLET ORAL DAILY
Qty: 30 TAB | Refills: 0 | Status: SHIPPED | OUTPATIENT
Start: 2017-09-05 | End: 2017-09-13 | Stop reason: SDUPTHER

## 2017-09-05 RX ORDER — GABAPENTIN 100 MG/1
100 CAPSULE ORAL 3 TIMES DAILY
Qty: 90 CAP | Refills: 0 | Status: SHIPPED | OUTPATIENT
Start: 2017-09-05 | End: 2018-02-14

## 2017-09-05 RX ORDER — POTASSIUM CHLORIDE 20 MEQ/1
20 TABLET, EXTENDED RELEASE ORAL ONCE
Status: COMPLETED | OUTPATIENT
Start: 2017-09-05 | End: 2017-09-05

## 2017-09-05 RX ORDER — CARVEDILOL 6.25 MG/1
6.25 TABLET ORAL 2 TIMES DAILY WITH MEALS
Qty: 60 TAB | Refills: 0 | Status: SHIPPED | OUTPATIENT
Start: 2017-09-05 | End: 2017-09-13 | Stop reason: SDUPTHER

## 2017-09-05 RX ORDER — GABAPENTIN 100 MG/1
100 CAPSULE ORAL 3 TIMES DAILY
Status: DISCONTINUED | OUTPATIENT
Start: 2017-09-05 | End: 2017-09-05

## 2017-09-05 RX ORDER — CARVEDILOL 6.25 MG/1
6.25 TABLET ORAL 2 TIMES DAILY WITH MEALS
Status: DISCONTINUED | OUTPATIENT
Start: 2017-09-05 | End: 2017-09-05 | Stop reason: HOSPADM

## 2017-09-05 RX ORDER — FOLIC ACID 1 MG/1
1 TABLET ORAL DAILY
Qty: 30 TAB | Refills: 0 | Status: SHIPPED | OUTPATIENT
Start: 2017-09-05 | End: 2018-02-14

## 2017-09-05 RX ORDER — PRASUGREL 10 MG/1
10 TABLET, FILM COATED ORAL DAILY
Qty: 30 TAB | Refills: 0 | Status: SHIPPED | OUTPATIENT
Start: 2017-09-05 | End: 2017-09-13 | Stop reason: SDUPTHER

## 2017-09-05 RX ORDER — POTASSIUM CHLORIDE 20 MEQ/1
20 TABLET, EXTENDED RELEASE ORAL DAILY
Status: DISCONTINUED | OUTPATIENT
Start: 2017-09-05 | End: 2017-09-05

## 2017-09-05 RX ADMIN — FOLIC ACID 1 MG: 1 TABLET ORAL at 08:26

## 2017-09-05 RX ADMIN — MAGNESIUM GLUCONATE 500 MG ORAL TABLET 400 MG: 500 TABLET ORAL at 12:05

## 2017-09-05 RX ADMIN — CARVEDILOL 3.12 MG: 3.12 TABLET, FILM COATED ORAL at 08:26

## 2017-09-05 RX ADMIN — POTASSIUM CHLORIDE 20 MEQ: 1500 TABLET, EXTENDED RELEASE ORAL at 12:05

## 2017-09-05 RX ADMIN — ASPIRIN 81 MG: 81 TABLET ORAL at 08:27

## 2017-09-05 RX ADMIN — AMPICILLIN SODIUM AND SULBACTAM SODIUM 3 G: 2; 1 INJECTION, POWDER, FOR SOLUTION INTRAMUSCULAR; INTRAVENOUS at 12:02

## 2017-09-05 RX ADMIN — Medication 100 MG: at 08:26

## 2017-09-05 RX ADMIN — GABAPENTIN 100 MG: 100 CAPSULE ORAL at 08:26

## 2017-09-05 RX ADMIN — CARVEDILOL 6.25 MG: 6.25 TABLET, FILM COATED ORAL at 16:29

## 2017-09-05 RX ADMIN — DIVALPROEX SODIUM 250 MG: 125 CAPSULE, COATED PELLETS ORAL at 15:19

## 2017-09-05 RX ADMIN — GABAPENTIN 100 MG: 100 CAPSULE ORAL at 15:19

## 2017-09-05 RX ADMIN — DIVALPROEX SODIUM 250 MG: 125 CAPSULE, COATED PELLETS ORAL at 06:12

## 2017-09-05 RX ADMIN — PRASUGREL HYDROCHLORIDE 10 MG: 10 TABLET, FILM COATED ORAL at 08:27

## 2017-09-05 RX ADMIN — TIOTROPIUM BROMIDE 1 CAPSULE: 18 CAPSULE ORAL; RESPIRATORY (INHALATION) at 08:27

## 2017-09-05 RX ADMIN — BUDESONIDE AND FORMOTEROL FUMARATE DIHYDRATE 2 PUFF: 160; 4.5 AEROSOL RESPIRATORY (INHALATION) at 08:27

## 2017-09-05 RX ADMIN — AMPICILLIN SODIUM AND SULBACTAM SODIUM 3 G: 2; 1 INJECTION, POWDER, FOR SOLUTION INTRAMUSCULAR; INTRAVENOUS at 06:12

## 2017-09-05 RX ADMIN — ATORVASTATIN CALCIUM 80 MG: 80 TABLET, FILM COATED ORAL at 08:26

## 2017-09-05 RX ADMIN — LISINOPRIL 5 MG: 5 TABLET ORAL at 08:26

## 2017-09-05 ASSESSMENT — ENCOUNTER SYMPTOMS
WHEEZING: 0
SPEECH CHANGE: 0
SEIZURES: 0
DIZZINESS: 0
SHORTNESS OF BREATH: 0
VOMITING: 0
PSYCHIATRIC NEGATIVE: 1
FLANK PAIN: 0
CHILLS: 0
PND: 0
NAUSEA: 0
SPUTUM PRODUCTION: 0
COUGH: 0
LOSS OF CONSCIOUSNESS: 0
MUSCULOSKELETAL NEGATIVE: 1
BLURRED VISION: 0
FEVER: 0
PALPITATIONS: 0
FOCAL WEAKNESS: 0
WEAKNESS: 1
WEIGHT LOSS: 0
NERVOUS/ANXIOUS: 0
SHORTNESS OF BREATH: 1
SORE THROAT: 0
HEADACHES: 0
DOUBLE VISION: 0
ORTHOPNEA: 0
ABDOMINAL PAIN: 0
CLAUDICATION: 0
HEARTBURN: 0

## 2017-09-05 ASSESSMENT — GAIT ASSESSMENTS
DISTANCE (FEET): 150
GAIT LEVEL OF ASSIST: SUPERVISED

## 2017-09-05 ASSESSMENT — LIFESTYLE VARIABLES
PAROXYSMAL SWEATS: NO SWEAT VISIBLE
HEADACHE, FULLNESS IN HEAD: NOT PRESENT
AUDITORY DISTURBANCES: NOT PRESENT
ANXIETY: NO ANXIETY (AT EASE)
TOTAL SCORE: 0
VISUAL DISTURBANCES: NOT PRESENT
TREMOR: NO TREMOR
NAUSEA AND VOMITING: NO NAUSEA AND NO VOMITING
ORIENTATION AND CLOUDING OF SENSORIUM: ORIENTED AND CAN DO SERIAL ADDITIONS
AGITATION: NORMAL ACTIVITY

## 2017-09-05 ASSESSMENT — COGNITIVE AND FUNCTIONAL STATUS - GENERAL
MOBILITY SCORE: 24
SUGGESTED CMS G CODE MODIFIER MOBILITY: CH

## 2017-09-05 ASSESSMENT — PAIN SCALES - GENERAL: PAINLEVEL_OUTOF10: 0

## 2017-09-05 NOTE — PROGRESS NOTES
Cardiology Interval Note    Patient states he is feeling better today than yesterday. He has more strength and feels like he could walk around the floor.     Patient is no longer requiring supplemental oxygen and his HR has generally been in the 70s-80s, although he has been having short runs of VTach, PVCs and PACs. This am he had 20 beats of asymptomatic VTach. He denies any chest pain, worsening SOB or palpitaions during this time.      Review of Systems   Constitutional: Negative for fever and malaise/fatigue.        Fatigue and weakness is improving   Eyes: Negative for blurred vision.   Respiratory: Positive for shortness of breath. Negative for cough.         Improving SOB since admission   Cardiovascular: Negative for chest pain, palpitations and leg swelling.   Gastrointestinal: Negative for abdominal pain.   Neurological: Positive for weakness. Negative for dizziness, focal weakness and headaches.   Psychiatric/Behavioral: The patient is not nervous/anxious.      Quality-Core Measures      Physical Exam       Vitals:    09/05/17 0117 09/05/17 0351 09/05/17 0616 09/05/17 0712   BP:  100/66  110/64   Pulse:  66  71   Resp:  16  18   Temp:  36.3 °C (97.4 °F)  36.3 °C (97.4 °F)   SpO2: 97% 96% 97% 96%   Weight:       Height:         Body mass index is 24.6 kg/m². Weight: 73.4 kg (161 lb 13.1 oz)  Oxygen Therapy:  Pulse Oximetry: 96 %, O2 (LPM): 0, O2 Delivery: None (Room Air)    Physical Exam   Constitutional: He is oriented to person, place, and time. No distress.   Neck: Neck supple.   Cardiovascular: Exam reveals no gallop and no friction rub.    No murmur heard.  Irregular rhythm is auscultated.     Pulmonary/Chest: No respiratory distress. He has no wheezes. He has no rales.   Abdominal: He exhibits no distension. There is no tenderness.   Musculoskeletal: He exhibits no edema or tenderness.   Neurological: He is alert and oriented to person, place, and time. No cranial nerve deficit.   Skin: Skin  is warm and dry. No rash noted. He is not diaphoretic.   Psychiatric: Affect normal.         Lab Data Review:   9/5/2017  9:37 AM    Recent Labs      09/03/17 0530 09/04/17 0146 09/05/17 0226   SODIUM  136  137  136   POTASSIUM  4.7  4.1  3.7   CHLORIDE  109  107  107   CO2  20  22  22   BUN  25*  20  21   CREATININE  1.78*  1.92*  1.91*   MAGNESIUM   --   2.2   --    CALCIUM  8.7  8.6  8.2*       Recent Labs      09/03/17 0530 09/04/17 0146 09/05/17 0226   ALTSGPT   --   23   --    ASTSGOT   --   43   --    ALKPHOSPHAT   --   17*   --    TBILIRUBIN   --   0.7   --    GLUCOSE  115*  106*  98       Recent Labs      09/03/17 0530 09/04/17 0146   RBC  3.55*  3.65*   HEMOGLOBIN  11.4*  11.8*   HEMATOCRIT  33.7*  35.1*   PLATELETCT  152*  157*       Recent Labs      09/03/17 0530 09/04/17 0146   WBC  14.0*  9.4   ASTSGOT   --   43   ALTSGPT   --   23   ALKPHOSPHAT   --   17*   TBILIRUBIN   --   0.7           Assessment/Plan     #NSTEMI  #Acute HFrEF 35%  -s/p stent in LAD  -troponins trended down  -EKG no changes (QRS 40)  -currently asymptomatic - SOB markedly improved - no longer requiring O2.     Plan:  -cont ASA, BB, ACEi, prasugrel, statin  -increase carvedilol to 6.25mg  -CTM telemetry  -will need follow up ECHO to asses changes in EF    #Acute arrhythmia  -periodic non-sustained/asymptomatic VTach (20 beats) and PSVT (3 seconds, HR in 130s)  -EP consult placed for possible AICD vs CRT  -increase carvedilol

## 2017-09-05 NOTE — CARE PLAN
Problem: Safety  Goal: Will remain free from injury  Outcome: PROGRESSING AS EXPECTED  Unique Newby Fall Risk Assessment:     Last Known Fall: No falls  Mobility: Dizziness/generalized weakness  Medications: Cardiovascular or central nervous system meds  Mental Status/LOC/Awareness: Awake, alert, and oriented to date, place, and person  Toileting Needs: Use of assistive device (Bedside commode, bedpan, urinal)  Volume/Electrolyte Status: No problems  Communication/Sensory: Visual (Glasses)/hearing deficit  Behavior: Appropriate behavior  Unique Newby Fall Risk Total: 8  Fall Risk Level: LOW RISK    Universal Fall Precautions:  call light/belongings in reach, bed in low position and locked, wheelchairs and assistive devices out of sight, siderails up x 2, use non-slip footwear, clutter free and spill free environment, adequate lighting, educate on level of risk, educate to call for assistance    Fall Risk Level Interventions:   TRIAL (TELE 8, NEURO, MED ROXY 5) Low Fall Risk Interventions  Place yellow fall risk ID band on patient: verified  Provide patient/family education based on risk assessment: completed  Educate patient/family to call staff for assistance when getting out of bed: completed  Place fall precaution signage outside patient door: verifiedTRIAL (TELE 8, NEURO, MED ROXY 5) Moderate Fall Risk Interventions  Place yellow fall risk ID band on patient: verified  Provide patient/family education based on risk assessment : verified  Educate patient/family to call staff for assistance when getting out of bed: verified  Place fall precaution signage outside patient door: verified  Utilize bed/chair fall alarm: verifiedTRIAL (TELE 8, NEURO, MED ROXY 5) High Fall Risk Interventions  Place yellow fall risk ID band on patient: verified  Provide patient/family education based on risk assessment: completed  Educate patient/family to call staff for assistance when getting out of bed: completed  Place fall  precaution signage outside patient door: verified  Place patient in room close to nursing station: verified  Utilize bed/chair fall alarm: verified  Notify charge of high risk for huddle: verified    Patient Specific Interventions:     Medication: review medications with patient and family, assess for medications that can be discontinued or dosage decreased, limit combination of prn medications, provide patients who received diuretics/laxatives frequent toileting and assess need for orthostatic hypotension evaluation  Mental Status/LOC/Awareness: reinforce falls education and check on patient hourly  Toileting: provide frquent toileting, monitor intake and output/use of appropriate interventions, instruct male patients prone to dizziness to void while sitting and instruct patient/family on the use of grab bars  Volume/Electrolyte Status: ensure patient remains hydrated, monitor abnormal lab values, ensure IV fluids are appropriate and teach patients to dangle before rising if hypotensive  Communication/Sensory: update plan of care on whiteboard, ensure proper positioning when transferrng/ambulating and ensure patient has glasses/contacts and hearing aids/dentures  Behavioral: encourage patient to voice feelings, engage patient in daily activities, initiate plan of care for alcohol withdrawal and instruct/reinforce fall program rationale  Mobility: schedule physical activity throughout the day, provide comfort measures during transport, dangle prior to standing, ensure bed is locked and in lowest position, provide appropriate assistive device, instruct patient to exit bed on their strongest side and collaborate with doctor for possible PT/OT consult    Problem: Respiratory:  Goal: Respiratory status will improve  Outcome: PROGRESSING AS EXPECTED  Pt is maintaining PaO2 greater than 92% on 1L O2

## 2017-09-05 NOTE — THERAPY
"Physical Therapy Evaluation completed.   Bed Mobility:  Supine to Sit: Supervised  Transfers: Sit to Stand: Supervised  Gait: Level Of Assist: Supervised with No Equipment Needed       Plan of Care: Patient with no further skilled PT needs in the acute care setting at this time  Discharge Recommendations: Equipment: No Equipment Needed.    See \"Rehab Therapy-Acute\" Patient Summary Report for complete documentation.     "

## 2017-09-05 NOTE — PROGRESS NOTES
Unique Newby Fall Risk Assessment:     Last Known Fall: No falls  Mobility: Dizziness/generalized weakness  Medications: Cardiovascular or central nervous system meds  Mental Status/LOC/Awareness: Awake, alert, and oriented to date, place, and person  Toileting Needs: Use of assistive device (Bedside commode, bedpan, urinal)  Volume/Electrolyte Status: No problems  Communication/Sensory: Visual (Glasses)/hearing deficit  Behavior: Appropriate behavior  Unique Newby Fall Risk Total: 8  Fall Risk Level: LOW RISK    Universal Fall Precautions:  call light/belongings in reach, bed in low position and locked, siderails up x 2, use non-slip footwear, adequate lighting, clutter free and spill free environment, educate on level of risk, educate to call for assistance    Fall Risk Level Interventions:   TRIAL ([employer], NEURO, MED ROXY 5) Low Fall Risk Interventions  Place yellow fall risk ID band on patient: verified  Provide patient/family education based on risk assessment: completed  Educate patient/family to call staff for assistance when getting out of bed: completed  Place fall precaution signage outside patient door: verifiedTRIAL ([employer], NEURO, MED ROXY 5) Moderate Fall Risk Interventions  Place yellow fall risk ID band on patient: verified  Provide patient/family education based on risk assessment : verified  Educate patient/family to call staff for assistance when getting out of bed: verified  Place fall precaution signage outside patient door: verified  Utilize bed/chair fall alarm: verifiedTRIAL ([employer], NEURO, [employer] ROXY 5) High Fall Risk Interventions  Place yellow fall risk ID band on patient: verified  Provide patient/family education based on risk assessment: completed  Educate patient/family to call staff for assistance when getting out of bed: completed  Place fall precaution signage outside patient door: verified  Place patient in room close to nursing station: verified  Utilize bed/chair fall alarm:  verified  Notify charge of high risk for huddle: verified    Patient Specific Interventions:     Medication: review medications with patient and family  Mental Status/LOC/Awareness: reinforce falls education, check on patient hourly, utilize bed/chair fall alarm, reinforce the use of call light and provide activity  Toileting: provide frquent toileting and monitor intake and output/use of appropriate interventions  Volume/Electrolyte Status: ensure patient remains hydrated and monitor abnormal lab values  Communication/Sensory: update plan of care on whiteboard and ensure patient has glasses/contacts and hearing aids/dentures  Behavioral: encourage patient to voice feelings, engage patient in daily activities and administer medication as ordered  Mobility: schedule physical activity throughout the day, utilize bed/chair fall alarm, ensure bed is locked and in lowest position and instruct patient to exit bed on their strongest side

## 2017-09-05 NOTE — DISCHARGE INSTRUCTIONS
Discharge Instructions    Discharged to home by car with relative. Discharged via wheelchair, hospital escort: Yes.  Special equipment needed: Not Applicable    Be sure to schedule a follow-up appointment with your primary care doctor or any specialists as instructed.     Discharge Plan:   Smoking Cessation Offered: Patient Refused  Pneumococcal Vaccine Given - only chart on this line when given:  (patient refuses vaccine)  Influenza Vaccine Indication: Patient Refuses    I understand that a diet low in cholesterol, fat, and sodium is recommended for good health. Unless I have been given specific instructions below for another diet, I accept this instruction as my diet prescription.   Other diet: Cardiac    Special Instructions: Diagnosis:  Acute Coronary Syndrome (ACS) is a diagnosis that encompasses cardiac-related chest pain and heart attack. ACS occurs when the blood flow to the heart muscle is severely reduced or cut off completely due to a slow process called atherosclerosis.  Atherosclerosis is a disease in which the coronary arteries become narrow from a buildup of fat, cholesterol, and other substances that combine to form plaque. If the plaque breaks, a blood clot will form and block the blood flow to the heart muscle. This lack of blood flow can cause damage or death to the heart muscle which is called a heart attack or Myocardial Infarction (MI). There are two different types of MIs:  ST Elevation Myocardial Infarction or STEMI (the most severe type of heart attack) and Non-ST Elevation Myocardial Infarction or NSTEMI.    Treatment Plan:  · Cardiac Diet  - Low fat, low salt, low cholesterol   · Cardiac Rehab  - Your doctor has ordered you a referral to [facility].  Call [phone] to schedule an appointment.  · Attend my follow-up appointment with my Cardiologist.  · Take my medications as prescribed by my doctor  · Exercise daily  · Quit Smoking and Reduce stress    Medications:  Certain medications are  used to treat ACS.  Remember to always take medications as prescribed and never stop talking medications unless told by your doctor.    You have been prescribed the following medicatons:    Aspirin - Aspirin is used as a blood thinning medication and you will require this medication indefinitely.  Anti-platelet/blood thinner - Your Anti-platelet/Blood thinning medication is called Effient, and is used in combination with aspirin to prevent clots from forming in your heart and/or around your stent.  Your doctor will determine how long you need to be on this medicine.  Beta-Blocker - Beta-Blocker Carvedilol is used to lower blood pressure and heart rate, and/or helps your heart heal after a heart attack.  Statin - Statin Atorvastatin is used to lower cholesterol.  Angiotensin Converting Enzyme (ACE) Inhibitor - Angiotensin Converting Enzyme Inhibitor Lisinopril is used to lower blood pressure and treat heart failure.    · Is patient discharged on Warfarin / Coumadin?   No     · Is patient Post Blood Transfusion?  No    Depression / Suicide Risk    As you are discharged from this [facility] facility, it is important to learn how to keep safe from harming yourself.    Recognize the warning signs:  · Abrupt changes in personality, positive or negative- including increase in energy   · Giving away possessions  · Change in eating patterns- significant weight changes-  positive or negative  · Change in sleeping patterns- unable to sleep or sleeping all the time   · Unwillingness or inability to communicate  · Depression  · Unusual sadness, discouragement and loneliness  · Talk of wanting to die  · Neglect of personal appearance   · Rebelliousness- reckless behavior  · Withdrawal from people/activities they love  · Confusion- inability to concentrate     If you or a loved one observes any of these behaviors or has concerns about self-harm, here's what you can do:  · Talk about it- your feelings and reasons for harming  yourself  · Remove any means that you might use to hurt yourself (examples: pills, rope, extension cords, firearm)  · Get professional help from the community (Mental Health, Substance Abuse, psychological counseling)  · Do not be alone:Call your Safe Contact- someone whom you trust who will be there for you.  · Call your local CRISIS HOTLINE 671-9062 or 899-261-0889  · Call your local Children's Mobile Crisis Response Team [place] (894) 765-9507 or wwwTISSUELAB  · Call the toll free National Suicide Prevention Hotlines   · National Suicide Prevention Lifeline 290-882-HLSE (3699)  · [employer] Line Network 800-SUICIDE (675-3268)    Non-ST Segment Elevation Heart Attack  A heart attack (myocardial infarction) happens when some of the heart muscle is injured or dies because it does not get enough oxygen. A non-ST segment elevation heart attack is a type of heart attack. It happens when the body does not get enough oxygen because an artery carrying blood to the heart muscles (coronary artery) becomes partly or temporarily blocked. This type of heart attack is usually less severe than the type of heart attack in which a coronary artery becomes completely blocked.  CAUSES  The most common cause of this condition is a blocked coronary artery. A coronary artery can become blocked from a gradual buildup of cholesterol, fat, and plaque. A blood clot can form over the plaque and block blood flow.  RISK FACTORS  This condition is more likely to develop in:  · Smokers.  · Males.  · Older adults.  · Overweight and obese adults.  · People with high blood pressure (hypertension), high cholesterol, or diabetes.  · People with a family history of heart disease.  · People who do not get enough exercise.  · People who are under a lot of stress.  · People who drink too much alcohol.  · People who use illegal street drugs that increase the heart rate, such as cocaine and methamphetamines.  SYMPTOMS  Symptoms of this  condition include:  · Chest pain or a feeling of pressure in the chest. It may feel like something is crushing or squeezing the chest.  · Discomfort in the upper back or in the area between the shoulder blades.  · Upper back pain.  · Tingling in the hands and arms.  · Shortness of breath.  · Heartburn or indigestion.  · Sudden cold sweats.  · Unexplained sweating.  · Sudden lightheadedness.  · Unexplained feelings of nervousness or anxiety.  · Feeling of tiredness, or not feeling well.  DIAGNOSIS  This condition is diagnosed based on a person's signs and symptoms and a physical exam. You may also have tests done, including:  · Blood tests.  · A chest X-ray.  · An test to measure the electrical activity of the heart (electrocardiogram).  · A test that uses sound waves to produce a picture of the heart (echocardiogram).  · A test to look at the heart arteries (coronary angiogram).  If you are still having chest pain after 12-24 hours, or if your health care providers think your heart is at risk, you may have a procedure called cardiac catheterization. In this procedure, a long, thin tube is inserted into an artery in your groin and moved up to the arteries in your heart. This procedure helps your health care provider figure out the source of the problem.   TREATMENT  This condition may be treated with:  · Bed rest in the hospital.  · Medicines to relieve chest pain.  · Medicines to protect the heart.  · If you have a blockage, a procedure in which the artery is opened (angioplasty) and a stent is placed to keep the artery open.  After initial treatment you may need to take medicine to:  · Keep your blood from clotting too easily.  · Control your blood pressure.  · Lower your cholesterol.  · Control abnormal heart rhythms (arrhythmias).  HOME CARE INSTRUCTIONS  · Take medicines only as directed by your health care provider.  · Do not take the following medicines unless your health care provider  approves:  ¨ Nonsteroidal anti-inflammatory drugs (NSAIDs), such as ibuprofen, naproxen, or celecoxib.  ¨ Vitamin supplements that contain vitamin A, vitamin E, or both.  ¨ Hormone replacement therapy that contains estrogen with or without progestin.  · Make lifestyle changes as directed by your health care provider. These may include:  ¨ Using no tobacco products, including cigarettes, chewing tobacco, and electronic cigarettes. If you are struggling to quit, ask your health care provider for help.  ¨ Exercising as directed by your health care provider. Ask for a list of activities that are safe for you.  ¨ Eating a heart-healthy diet. Work with a registered dietitian to learn healthy eating options.  ¨ Maintaining a healthy weight.  ¨ Managing other medical conditions, like diabetes.  ¨ Reducing stress.  ¨ Limiting how much alcohol you drink as directed by your health care provider.  SEEK IMMEDIATE MEDICAL CARE IF:  · You have any symptoms of this condition.     This information is not intended to replace advice given to you by your health care provider. Make sure you discuss any questions you have with your health care provider.     Document Released: 07/17/2006 Document Revised: 03/11/2013 Document Reviewed: 11/25/2015  [email or web address] Interactive Patient Education ©2016 [email or web address]

## 2017-09-05 NOTE — CARE PLAN
Problem: Communication  Goal: The ability to communicate needs accurately and effectively will improve  Outcome: PROGRESSING AS EXPECTED  Reviewed plan of care for day, Pt verbalizes understanding.

## 2017-09-05 NOTE — PROGRESS NOTES
Monitor tech notified RN patient had 8 beats vtach. Asymptomatic. Recent history of runs of vtach on this admission.

## 2017-09-05 NOTE — PROGRESS NOTES
"Cardiology Progress Note               Author: Kaye Person Date & Time created: 9/5/2017  11:34 AM     Interval History:  Patient seen on EPS rounds.  Consulted for WCT 20 beats at 0901 hours in setting of Non-ST segment MI. Stenting of subtotal Proximal LAD with UTE.  HPI:  This is a 71-year-old gentleman with a history of recent endovascular abdominal aortic repair February 2017 by Dr. Calvin Watt which was unfortunately complicated by ischemic bowel which resolved prior to hospital discharge. He is followed by Dr. Antonio Davila in cardiology, and has a history of hypertension, emphysema anxiety and depression. He presents after noticing substernal burning chest pressure this morning at 5 AM. Subsequently this has resolved, he is unclear on the details of how long it lasted or what improved it. He also notes now that he has upper abdominal discomfort. CT aortography as planned by the emergency department. Troponin is markedly elevated at 11 and elevated BNP. He denies any shortness of breath PND or orthopnea. His EKG is unremarkable aside from borderline ST depression laterally.     IMPRESSION:  1.  Successful stenting of subtotal stenosis of the proximal left anterior   descending with a 3.5x18 mm Xience Alpine stent.  2.  A 40% lesion of the proximal right coronary artery.  3.  A 40% lesion in the mid obtuse marginal artery.  4.  Stenosis at the origin of a \"jailed\" diagonal artery.  This lesion could   not be angioplastied as it would not pass through the side of the stent.  5.  Elevated LVEDP.  6.  LV dysfunction with EF in the 45% range.     Echocardiography Laboratory    CONCLUSIONS  Left ventricular ejection fraction is visually estimated to be 35%.    Wall motion abnormalities as described.  There is eccentric, posteriorly directed mitral regurgitation which is   moderate to severe.  Moderate tricuspid regurgitation.  Right ventricular systolic pressure is estimated to be 60 mmHg.    Review of Systems "   Constitutional: Negative for chills, fever, malaise/fatigue and weight loss.   HENT: Negative for congestion and sore throat.    Eyes: Negative for blurred vision and double vision.   Respiratory: Negative for cough, sputum production, shortness of breath and wheezing.    Cardiovascular: Negative for chest pain, palpitations, orthopnea, claudication, leg swelling and PND.   Gastrointestinal: Negative for abdominal pain, heartburn, nausea and vomiting.   Genitourinary: Negative for dysuria, flank pain and frequency.   Musculoskeletal: Negative.    Skin: Negative.    Neurological: Negative for dizziness, speech change, focal weakness, seizures, loss of consciousness and headaches.   Endo/Heme/Allergies: Negative.    Psychiatric/Behavioral: Negative.        Physical Exam   Constitutional: He is oriented to person, place, and time. He appears well-developed and well-nourished.   HENT:   Head: Normocephalic and atraumatic.   Eyes: Pupils are equal, round, and reactive to light.   Neck: Normal range of motion. Neck supple. No thyromegaly present.   Cardiovascular: Normal rate and regular rhythm.  Exam reveals no gallop and no friction rub.    No murmur heard.  Pulmonary/Chest: Effort normal and breath sounds normal. No respiratory distress. He has no wheezes. He has no rales.   Abdominal: Soft. Bowel sounds are normal. He exhibits no distension. There is no tenderness. There is no guarding.   Musculoskeletal: Normal range of motion. He exhibits no edema.   Neurological: He is alert and oriented to person, place, and time.   Skin: Skin is warm and dry.   Psychiatric: He has a normal mood and affect.       Hemodynamics:  Temp (24hrs), Av.5 °C (97.7 °F), Min:36.3 °C (97.4 °F), Max:36.8 °C (98.3 °F)  Temperature: 36.3 °C (97.4 °F)  Pulse  Av.3  Min: 63  Max: 166  Blood Pressure : 110/64     Respiratory:    Respiration: 18, Pulse Oximetry: 96 %     #MDI/DPI Given: MDI/DPI x 1, Work Of Breathing / Effort: Mild  RUL  Breath Sounds: Clear, RML Breath Sounds: Diminished, RLL Breath Sounds: Diminished, GISSELLE Breath Sounds: Clear, LLL Breath Sounds: Diminished  Fluids:  Date 09/05/17 0700 - 09/06/17 0659   Shift 5991-9614 1532-5383 5385-8003 24 Hour Total   I  N  T  A  K  E   Shift Total       O  U  T  P  U  T   Urine 250   250    Shift Total 250   250   Weight (kg) 73.4 73.4 73.4 73.4       Weight: 73.4 kg (161 lb 13.1 oz)  GI/Nutrition:  Orders Placed This Encounter   Procedures   • Diet Order     Standing Status:   Standing     Number of Occurrences:   1     Order Specific Question:   Diet:     Answer:   Cardiac [6]     Lab Results:  Recent Labs      09/03/17 0530 09/04/17   0146   WBC  14.0*  9.4   RBC  3.55*  3.65*   HEMOGLOBIN  11.4*  11.8*   HEMATOCRIT  33.7*  35.1*   MCV  94.9  96.2   MCH  32.1  32.3   MCHC  33.8  33.6*   RDW  57.5*  59.4*   PLATELETCT  152*  157*   MPV  11.1  11.6     Recent Labs      09/03/17 0530 09/04/17 0146  09/05/17   0226   SODIUM  136  137  136   POTASSIUM  4.7  4.1  3.7   CHLORIDE  109  107  107   CO2  20  22  22   GLUCOSE  115*  106*  98   BUN  25*  20  21   CREATININE  1.78*  1.92*  1.91*   CALCIUM  8.7  8.6  8.2*                         Medical Decision Making, by Problem:  Active Hospital Problems    Diagnosis   • *NSTEMI (non-ST elevated myocardial infarction) (CMS-HCC) [I21.4]   • Chest pain [R07.9]   • Leukocytosis [D72.829]   • Aorta aneurysm (CMS-HCC) [I71.9]   • Anxiety [F41.9]   • Systolic heart failure (CMS-HCC) [I50.20]   • Hypokalemia [E87.6]   • Alcohol use [Z78.9]   • TD (acute kidney injury) (CMS-HCC) [N17.9]   • Nausea [R11.0]   • COPD (chronic obstructive pulmonary disease) (CMS-HCC) [J44.9]       Plan:  WCT episode 20 beats this AM. Prior episodes of WCT 8 beats.   Prior episodes of PAF as well.   Patency of vessel would be helpful .  EF is reduced at 35%.  K+ 3.7 20 mEq ordered. Magnesium oxide 400mg added daily  Push BB.  Question secondary prevention device.  Await   Chay's recommendations.    Reviewed items::  EKG reviewed, Labs reviewed and Medications reviewed

## 2017-09-06 ENCOUNTER — PATIENT OUTREACH (OUTPATIENT)
Dept: HEALTH INFORMATION MANAGEMENT | Facility: OTHER | Age: 72
End: 2017-09-06

## 2017-09-06 ENCOUNTER — TELEPHONE (OUTPATIENT)
Dept: VASCULAR LAB | Facility: MEDICAL CENTER | Age: 72
End: 2017-09-06

## 2017-09-06 NOTE — CONSULTS
DATE OF SERVICE:  09/05/2017    ELECTROPHYSIOLOGY CONSULT    REFERRING PHYSICIAN:  Jen Killian MD    REASON FOR CONSULTATION:  Nonsustained VT, ischemic cardiomyopathy.    HISTORY OF PRESENT ILLNESS:  A pleasant 71-year-old white male who lives in   [place], [place].  Patient had chest discomfort and subsequently had   a non-Q-wave myocardial infarction, was transferred from [facility] down to [facility] and subsequently had coronary   angiography and stenting.  His previous cardiac echo showed preserved LV   function on 01/23/2017.  This was a pre-AAA repair and the echocardiogram in   this admission showed an EF of 35%.  He has had nonsustained VT, asymptomatic.    He has had paroxysmal atrial fibrillation in the past.  No previous history   of myocardial infarction.  He did have positive troponins on this admission.    Past medical history includes paroxysmal atrial fibrillation, history of AAA   repair performed by Dr. Paulino, history of COPD and smoking.  He also drinks   significant amount of alcohol.    SOCIAL HISTORY:  He is .    ALLERGIES:  He has no children.  He is retired.    CURRENT INPATIENT MEDICATIONS:  Include the following:  He is on Coreg.  He is   on aspirin.  He is on Unasyn.  He is on atorvastatin, he is on Symbicort.  He   is on folic acid and gabapentin.  He is on lisinopril.  He is on nicotine   patch, magnesium oxide, Effient.  He also is on Depakote 250 q. 8.    REVIEW OF SYSTEMS:  CONSTITUTIONAL:  No fever, chills or sweat.  PULMONARY:  No chronic cough or hemoptysis.  GASTROINTESTINAL:  No change in bowels.  No diarrhea or constipation.  Rest of   review of systems negative by the AMA/CMS criteria.    PAST MEDICAL HISTORY:  Includes chronic renal sufficiency COPD, some heart   failure, nonsustained, VT, AAA repair in the past.    PAST SURGICAL HISTORY:  Includes AAA with stent graft, and cyst excision.    FAMILY HISTORY:  He has a positive  family history of heart disease in sister.    PHYSICAL EXAMINATION:  GENERAL:  A white male in no acute distress.  VITAL SIGNS:  Blood pressure is 129/60, pulse 84, respirations 16.  HEENT:  JVD is 7 cm.  Carotids without bruits.  LUNGS:  Clear to auscultation and percussion.  CARDIAC:  Regular rate and rhythm, normal S1, S2, without murmurs or gallops.  ABDOMEN:  Soft, nontender without hepatosplenomegaly.  EXTREMITIES:  No clubbing, cyanosis, or edema.  NEUROLOGIC:  Nonfocal.  PSYCHIATRIC:  Alert and oriented x3.  Mood and affect appropriate.      Echocardiogram on this admission showed an EF of 35%, eccentric,   moderate-to-severe MR.  PA pressure is 60.    LABORATORY DATA:  Creatinine 1.92.  LFTs were normal.  Electrolytes okay.  CBC   was okay.    DIAGNOSTIC DATA:  Electrocardiogram on admission showed sinus rhythm with   anteroseptal MI, nonspecific ST-T wave changes.  Repeat EKG on 09/04/2017   showed improvement of ST-T wave changes.  PVCs seen.  Rhythm strips shows some   nonsustained VT.  Troponins peaked at 14.1.  No cardiac catheterization was   performed, showed the following:  Subtotal LAD that was stented with good   results, jailed diagonal.    ASSESSMENT AND PLAN:  1.  Ischemic cardiomyopathy with a lower ejection fraction compared to the   previous echocardiogram earlier this year, was not on heart failure   medications, recently had stenting and myocardial infarction.  He has some   nonsustained VT that was asymptomatic.  At this point, I would recommend   continuing with best medical therapy including ACE inhibitor, beta blockers   and diuretics as needed.  Follow up echocardiogram in 40 days.  If his   ejection fraction is less than 35%, consideration for primary prevention   automatic implantable cardioverter-defibrillator.  2.  Coronary artery disease.  3.  Abdominal aortic aneurysm repair.  4.  Smoking.  5.  Alcoholism.       ____________________________________     EULALIA VIVEROS MD    UTE  / NTS    DD:  09/05/2017 15:08:46  DT:  09/05/2017 17:41:17    D#:  [id number]  Job#:  [id number]

## 2017-09-06 NOTE — PROGRESS NOTES
Pt AAOx4. Ambulates with SBA. PIV and Tele d/c'd per order. Pt discharged in stable condition with all belongings via transport with family. All questions answered.

## 2017-09-06 NOTE — TELEPHONE ENCOUNTER
Recent imaging results reviewed in accordance with institution EVAR guideline  Patient had an ultrasound done at [facility] on August 15, 2017  He had another ultrasound done later in August here at [facility] during hospitalization for acute coronary syndrome  Both studies demonstrate stable aortic stent graft without endoleak and slight decrease in residual aneurysm sac size.    Patient should follow-up with vascular surgery as needed/scheduled  Will defer medical management to cardiology    Anticipate follow-up interval iliac duplex in one year per guideline    Michael J. Bloch, MD  Vascular Care    CC:  Dr. Tana Paulino

## 2017-09-06 NOTE — DISCHARGE SUMMARY
DATE OF ADMISSION:  08/31/2017    DATE OF DISCHARGE:  09/05/2017    ADMISSION DIAGNOSES:  1.  Chronic obstructive pulmonary disease.  2.  Nausea.  3.  Acute kidney injury.  4.  Chest pain.  5.  Non-ST elevation myocardial infarction.  6.  Aortic aneurysm.    DIAGNOSES AT THE TIME OF DISCHARGE:  1.  Non-ST elevation myocardial infarction.  2.  Acute systolic congestive heart failure with ejection fraction of 35%.  3.  History of coronary artery disease status post stent in the left anterior   descending.  4.  Acute arrhythmia, he has nonsustained asymptomatic ventricular   tachycardia.  5.  Alcohol dependence.  6.  Hypokalemia, replenished.  7.  Acute kidney injury, resolved.  8.  Chronic obstructive pulmonary disease, resolved.  9.  Nausea, resolved.    CONSULTS DURING HOSPITALIZATION:  1.  Cardiology:  2.  EP.    PROCEDURES DURING HOSPITALIZATION:  Angiography with stenting of subtotally   occluded proximal LAD.    HOSPITAL COURSE:  Patient is a 71-year-old male that presented to the hospital   complaining of acute onset of chest pain.  When he presented, he was found to   have an elevated troponin.  Patient has a history of AAA repair that was done   in February of this year.  He also has a history of alcohol dependency in the   past.  Nonetheless, he was admitted to the hospital and treated for NSTEMI as   well as COPD.  Cardiology was consulted and continued to follow alongside.    Patient had a cardiac cath done, which showed subtotal occlusion and he had a   stent that was placed, he was also found to have new systolic congestive heart   failure with a reduced ejection fraction.  He was put on diuretics.  He was   also put on breathing treatments with supplemental oxygen.  During the   hospital course, the patient did develop asymptomatic ventricular tachycardic   arrhythmia.  EP was consulted regarding this.  His electrolytes were checked   and those were unremarkable.  EP evaluated the patient and did not  feel that   there if any urgent intervention that needed to be undertaken and recommended   that he stay off of beta blocker and be on appropriate medications and he   needs a recheck of his echocardiogram in approximately 40 days.  If his   ejection fraction is less than 35% and at that point, he may need a primary   prevention device, but we will medically manage for now.  There are no other   acute events during the hospitalization, he did quite well.  He is going to be   discharged home in stable condition.    DISCHARGE ACTIVITY:  As tolerated.    DISCHARGE FOLLOWUP:  With his PCP as soon as tolerated.    DISCHARGE DIET:  Cardiac.    DISCHARGE MEDICATIONS:  1.  Advair Diskus.  2.  Aspirin 81 mg daily.  3.  Atorvastatin 80 mg daily.  4.  Carvedilol 6.25 mg b.i.d.  5.  Folic acid 1 mg daily.  6.  Gabapentin 100 mg t.i.d.  7.  Lisinopril 5 mg daily.  8.  Magnesium oxide daily.  9.  Effient 10 mg daily.  10.  Vitamin B6.  11.  Spiriva HandiHaler.  12.  Flomax.  13.  Thiamine.    TOTAL TIME SPENT:  Total time of discharge is 45 minutes.       ____________________________________     MD REBECCA Whipple / SUKHJINDER    DD:  09/05/2017 17:25:55  DT:  09/05/2017 23:54:26    D#:  [id number]  Job#:  [id number]

## 2017-09-10 NOTE — DOCUMENTATION QUERY
"DOCUMENTATION QUERY    PROVIDERS: Please select “Cosign w/ note”to reply to query.    To better represent the severity of illness of your patient, please review the following information and exercise your independent professional judgment in responding to this query.     \"Concern for aspiration pneumonia\" is documented in the Progress Note dated 9-1-17.  Patient with nausea and vomiting on admission and chest x-ray with consolidations.  Aspiration empirically treated with Zosyn for 7 days documented in progress notes.  Based upon the clinical findings, risk factors, and treatment, please specify if this condition was present on admission or developed after admission    • Aspiration Pneumonia POA (please specify microorganisms, i.e., food, milk, gastric secretions, solids, liquids, oils, essences, vomitus)  • Aspiration pneumonia developed after admission  • Aspiration Pneumonia -  Ruled out  • Other type of Pneumonia (please document)  • Other explanation of Clinical Findings (please specify)  • Unable to determine        The medical record reflects the following:   Clinical Findings  NSTEMI, CAD, CHF, TD, Chronic renal insufficiency   Treatment  Stenting of LAD, Zosyn empirically    Risk Factors  CAD, NSTEMI, CHF, HTN, TD   Location within medical record  Progress notes     Thank you,   AYDIN Kern        "

## 2017-09-13 ENCOUNTER — OFFICE VISIT (OUTPATIENT)
Dept: CARDIOLOGY | Facility: MEDICAL CENTER | Age: 72
End: 2017-09-13
Payer: MEDICARE

## 2017-09-13 VITALS
WEIGHT: 157 LBS | OXYGEN SATURATION: 90 % | SYSTOLIC BLOOD PRESSURE: 90 MMHG | DIASTOLIC BLOOD PRESSURE: 50 MMHG | HEIGHT: 69 IN | HEART RATE: 76 BPM | BODY MASS INDEX: 23.25 KG/M2

## 2017-09-13 DIAGNOSIS — I50.20 ACC/AHA STAGE C SYSTOLIC HEART FAILURE (HCC): ICD-10-CM

## 2017-09-13 DIAGNOSIS — I25.5 ISCHEMIC CARDIOMYOPATHY: ICD-10-CM

## 2017-09-13 DIAGNOSIS — Z95.5 STENTED CORONARY ARTERY: ICD-10-CM

## 2017-09-13 DIAGNOSIS — I51.89 LEFT VENTRICULAR SYSTOLIC DYSFUNCTION, NYHA CLASS 3: ICD-10-CM

## 2017-09-13 DIAGNOSIS — Z78.9 ALCOHOL USE: ICD-10-CM

## 2017-09-13 PROCEDURE — 99215 OFFICE O/P EST HI 40 MIN: CPT | Performed by: INTERNAL MEDICINE

## 2017-09-13 RX ORDER — PRASUGREL 10 MG/1
10 TABLET, FILM COATED ORAL DAILY
Qty: 90 TAB | Refills: 3 | Status: SHIPPED | OUTPATIENT
Start: 2017-09-13 | End: 2018-09-24

## 2017-09-13 RX ORDER — LISINOPRIL 5 MG/1
5 TABLET ORAL DAILY
Qty: 90 TAB | Refills: 3 | Status: SHIPPED | OUTPATIENT
Start: 2017-09-13 | End: 2018-10-18 | Stop reason: SDUPTHER

## 2017-09-13 RX ORDER — LANOLIN ALCOHOL/MO/W.PET/CERES
100 CREAM (GRAM) TOPICAL DAILY
Qty: 90 TAB | Refills: 3 | Status: SHIPPED | OUTPATIENT
Start: 2017-09-13 | End: 2019-06-26

## 2017-09-13 RX ORDER — CARVEDILOL 6.25 MG/1
6.25 TABLET ORAL 2 TIMES DAILY WITH MEALS
Qty: 180 TAB | Refills: 3 | Status: SHIPPED | OUTPATIENT
Start: 2017-09-13 | End: 2017-10-11 | Stop reason: SDUPTHER

## 2017-09-13 RX ORDER — FUROSEMIDE 20 MG/1
20 TABLET ORAL 2 TIMES DAILY
COMMUNITY
End: 2017-09-13

## 2017-09-13 RX ORDER — ATORVASTATIN CALCIUM 80 MG/1
80 TABLET, FILM COATED ORAL DAILY
Qty: 90 TAB | Refills: 3 | Status: SHIPPED | OUTPATIENT
Start: 2017-09-13 | End: 2018-11-10 | Stop reason: SDUPTHER

## 2017-09-13 ASSESSMENT — ENCOUNTER SYMPTOMS
HEADACHES: 0
SHORTNESS OF BREATH: 1
EYE DISCHARGE: 0
FEVER: 0
CLAUDICATION: 0
PALPITATIONS: 0
COUGH: 0
ORTHOPNEA: 0
SENSORY CHANGE: 0
BLURRED VISION: 0
ABDOMINAL PAIN: 0
MYALGIAS: 0
DIZZINESS: 0
WEIGHT LOSS: 0
NAUSEA: 0
DEPRESSION: 0
DOUBLE VISION: 0
VOMITING: 0
FALLS: 0
HALLUCINATIONS: 0
SPEECH CHANGE: 0
EYE PAIN: 0
BLOOD IN STOOL: 0
CHILLS: 0
PND: 0
LOSS OF CONSCIOUSNESS: 0
BRUISES/BLEEDS EASILY: 0

## 2017-09-13 NOTE — PROGRESS NOTES
Subjective:   Khai Munoz is a 71 y.o. male who presents today For cardiac care after his recent hospitalization due to non-ST elevation myocardial infarction episode. Patient underwent PCI and stenting of his proximal LAD. He also has a history of peripheral vascular disease for which he underwent AAA repair in February 2017. Patient is doing okay right now. No chest pain. He does get winded with daily living activities. His left ventricular systolic function dropped to 35% after his myocardial infarction episode. He is currently still drinking and smoking.    Past Medical History:   Diagnosis Date   • Cancer (CMS-HCC) 2006    prostate   • Arrhythmia     afib   • Asthma     as a child   • Breath shortness    • Chronic cough    • Dental disorder     dental implants, bottom dentures implanted, upper, click on   • Emphysema of lung (CMS-HCC)    • High cholesterol    • Psychiatric problem     anxiety and depression   • 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CMS-HCC)    • Tinnitus    • Urinary bladder disorder     history of UTI     Past Surgical History:   Procedure Laterality Date   • AAA WITH STENT GRAFT  2/15/2017    Procedure: AAA WITH STENT GRAFT FOR: ENDOVASCULAR REPAIR OF INFRARENAL ABDOMINAL AORTIC ANEURYSM USING TWO DOCKING LIMBS;  Surgeon: Calvin Paulino M.D.;  Location: SURGERY [facility];  Service:    • CYST EXCISION      scrotal   • OTHER      brachy therapy and radiation for prostate ca   • OTHER      dental implants     Family History   Problem Relation Age of Onset   • Heart Disease Mother      History   Smoking Status   • Current Every Day Smoker   • Packs/day: 0.50   • Years: 50.00   • Types: Cigarettes   Smokeless Tobacco   • Never Used     Allergies   Allergen Reactions   • Declomycin      Reaction a long time ago     Outpatient Encounter Prescriptions as of 9/13/2017   Medication Sig Dispense Refill   • prasugrel (EFFIENT) 10 MG Tab Take 1 Tab by mouth every day. 90 Tab 3   • lisinopril (PRINIVIL) 5 MG  Tab Take 1 Tab by mouth every day. 90 Tab 3   • carvedilol (COREG) 6.25 MG Tab Take 1 Tab by mouth 2 times a day, with meals. 180 Tab 3   • atorvastatin (LIPITOR) 80 MG tablet Take 1 Tab by mouth every day. 90 Tab 3   • magnesium oxide (MAG-OX) 400 (241.3 Mg) MG Tab tablet Take 1 Tab by mouth every day. 30 Tab 0   • thiamine (THIAMINE) 100 MG tablet Take 1 Tab by mouth every day. 90 Tab 3   • gabapentin (NEURONTIN) 100 MG Cap Take 1 Cap by mouth 3 times a day. 90 Cap 0   • folic acid (FOLVITE) 1 MG Tab Take 1 Tab by mouth every day. 30 Tab 0   • tamsulosin (FLOMAX) 0.4 MG capsule Take 0.8 mg by mouth every bedtime.     • SPIRIVA HANDIHALER 18 MCG Cap Inhale 1 Cap by mouth every day.  3   • ADVAIR DISKUS 250-50 MCG/DOSE AEROSOL POWDER, BREATH ACTIVATED Take 1 Puff by mouth 2 Times a Day.  0   • pyridoxine 100 MG tablet Take 100 mg by mouth every day. Vitamin B-6     • aspirin 81 MG tablet Take 81 mg by mouth every day.     • [DISCONTINUED] furosemide (LASIX) 20 MG Tab Take 20 mg by mouth 2 times a day.     • [DISCONTINUED] thiamine (THIAMINE) 100 MG tablet Take 1 Tab by mouth every day. 30 Tab 0   • [DISCONTINUED] prasugrel (EFFIENT) 10 MG Tab Take 1 Tab by mouth every day. 30 Tab 0   • [DISCONTINUED] magnesium oxide (MAG-OX) 400 (241.3 Mg) MG Tab tablet Take 1 Tab by mouth every day. 30 Tab 0   • [DISCONTINUED] lisinopril (PRINIVIL) 5 MG Tab Take 1 Tab by mouth every day. 30 Tab 0   • [DISCONTINUED] carvedilol (COREG) 6.25 MG Tab Take 1 Tab by mouth 2 times a day, with meals. 60 Tab 0   • [DISCONTINUED] atorvastatin (LIPITOR) 80 MG tablet Take 80 mg by mouth every day.       No facility-administered encounter medications on file as of 9/13/2017.      Review of Systems   Constitutional: Negative for chills, fever, malaise/fatigue and weight loss.   HENT: Negative for ear discharge, ear pain, hearing loss and nosebleeds.    Eyes: Negative for blurred vision, double vision, pain and discharge.   Respiratory: Positive  "for shortness of breath. Negative for cough.    Cardiovascular: Negative for chest pain, palpitations, orthopnea, claudication, leg swelling and PND.   Gastrointestinal: Negative for abdominal pain, blood in stool, melena, nausea and vomiting.   Genitourinary: Negative for dysuria and hematuria.   Musculoskeletal: Negative for falls, joint pain and myalgias.   Skin: Negative for itching and rash.   Neurological: Negative for dizziness, sensory change, speech change, loss of consciousness and headaches.   Endo/Heme/Allergies: Negative for environmental allergies. Does not bruise/bleed easily.   Psychiatric/Behavioral: Negative for depression, hallucinations and suicidal ideas.        Objective:   BP (!) 90/50   Pulse 76   Ht 1.753 m (5' 9.02\")   Wt 71.2 kg (157 lb)   SpO2 90%   BMI 23.17 kg/m²     Physical Exam   Constitutional: He is oriented to person, place, and time. No distress.   HENT:   Head: Normocephalic and atraumatic.   Eyes: EOM are normal.   Neck: Normal range of motion. No JVD present.   Cardiovascular: Normal rate, regular rhythm, normal heart sounds and intact distal pulses.  Exam reveals no gallop and no friction rub.    No murmur heard.  Bilateral femoral pulses are 2+, bilateral dorsalis pedis pulses are 2+, bilateral posterior tibialis pulses are 2+.   Pulmonary/Chest: No respiratory distress. He has no wheezes. He has no rales. He exhibits no tenderness.   Abdominal: Soft. Bowel sounds are normal. There is no tenderness. There is no rebound and no guarding.   The is no presence of abdominal bruits   Musculoskeletal: Normal range of motion.   Neurological: He is alert and oriented to person, place, and time.   Skin: Skin is warm and dry.   Psychiatric: He has a normal mood and affect.   Nursing note and vitals reviewed.      Assessment:     1. Stented coronary artery  BASIC METABOLIC PANEL    LIPID PANEL   2. ACC/AHA stage C systolic heart failure (CMS-HCC)  BASIC METABOLIC PANEL    LIPID " PANEL   3. Left ventricular systolic dysfunction, NYHA class 3  BASIC METABOLIC PANEL    LIPID PANEL   4. Ischemic cardiomyopathy  BASIC METABOLIC PANEL    LIPID PANEL   5. Alcohol use  BASIC METABOLIC PANEL    LIPID PANEL       Medical Decision Making:  Today's Assessment / Status / Plan:   Today, based on physical examination findings, patient is euvolemic. No JVD, lungs are clear to auscultation, no pitting edema in bilateral lower extremities, no ascites.    At this time patient is borderline hypotensive. I will hold his Lasix therapy.  Continue carvedilol, lisinopril, aspirin, prasugrel.

## 2017-09-13 NOTE — LETTER
[facility]   [address], [address]  [place] [address]  Phone: [phone]  Fax: [phone]              Khai Munoz  1945    Encounter Date: 9/13/2017    James Davila M.D.          PROGRESS NOTE:  Subjective:   Khai Munoz is a 71 y.o. male who presents today For cardiac care after his recent hospitalization due to non-ST elevation myocardial infarction episode. Patient underwent PCI and stenting of his proximal LAD. He also has a history of peripheral vascular disease for which he underwent AAA repair in February 2017. Patient is doing okay right now. No chest pain. He does get winded with daily living activities. His left ventricular systolic function dropped to 35% after his myocardial infarction episode. He is currently still drinking and smoking.    Past Medical History:   Diagnosis Date   • Cancer (CMS-HCC) 2006    prostate   • Arrhythmia     afib   • Asthma     as a child   • Breath shortness    • Chronic cough    • Dental disorder     dental implants, bottom dentures implanted, upper, click on   • Emphysema of lung (CMS-HCC)    • High cholesterol    • Psychiatric problem     anxiety and depression   • 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CMS-HCC)    • Tinnitus    • Urinary bladder disorder     history of UTI     Past Surgical History:   Procedure Laterality Date   • AAA WITH STENT GRAFT  2/15/2017    Procedure: AAA WITH STENT GRAFT FOR: ENDOVASCULAR REPAIR OF INFRARENAL ABDOMINAL AORTIC ANEURYSM USING TWO DOCKING LIMBS;  Surgeon: Calvin Paulino M.D.;  Location: SURGERY [facility];  Service:    • CYST EXCISION      scrotal   • OTHER      brachy therapy and radiation for prostate ca   • OTHER      dental implants     Family History   Problem Relation Age of Onset   • Heart Disease Mother      History   Smoking Status   • Current Every Day Smoker   • Packs/day: 0.50   • Years: 50.00   • Types: Cigarettes   Smokeless Tobacco   • Never Used          Allergies   Allergen Reactions   • Declomycin      Reaction a long time ago     Outpatient Encounter Prescriptions as of 9/13/2017   Medication Sig Dispense Refill   • prasugrel (EFFIENT) 10 MG Tab Take 1 Tab by mouth every day. 90 Tab 3   • lisinopril (PRINIVIL) 5 MG Tab Take 1 Tab by mouth every day. 90 Tab 3   • carvedilol (COREG) 6.25 MG Tab Take 1 Tab by mouth 2 times a day, with meals. 180 Tab 3   • atorvastatin (LIPITOR) 80 MG tablet Take 1 Tab by mouth every day. 90 Tab 3   • magnesium oxide (MAG-OX) 400 (241.3 Mg) MG Tab tablet Take 1 Tab by mouth every day. 30 Tab 0   • thiamine (THIAMINE) 100 MG tablet Take 1 Tab by mouth every day. 90 Tab 3   • gabapentin (NEURONTIN) 100 MG Cap Take 1 Cap by mouth 3 times a day. 90 Cap 0   • folic acid (FOLVITE) 1 MG Tab Take 1 Tab by mouth every day. 30 Tab 0   • tamsulosin (FLOMAX) 0.4 MG capsule Take 0.8 mg by mouth every bedtime.     • SPIRIVA HANDIHALER 18 MCG Cap Inhale 1 Cap by mouth every day.  3   • ADVAIR DISKUS 250-50 MCG/DOSE AEROSOL POWDER, BREATH ACTIVATED Take 1 Puff by mouth 2 Times a Day.  0   • pyridoxine 100 MG tablet Take 100 mg by mouth every day. Vitamin B-6     • aspirin 81 MG tablet Take 81 mg by mouth every day.     • [DISCONTINUED] furosemide (LASIX) 20 MG Tab Take 20 mg by mouth 2 times a day.     • [DISCONTINUED] thiamine (THIAMINE) 100 MG tablet Take 1 Tab by mouth every day. 30 Tab 0   • [DISCONTINUED] prasugrel (EFFIENT) 10 MG Tab Take 1 Tab by mouth every day. 30 Tab 0   • [DISCONTINUED] magnesium oxide (MAG-OX) 400 (241.3 Mg) MG Tab tablet Take 1 Tab by mouth every day. 30 Tab 0   • [DISCONTINUED] lisinopril (PRINIVIL) 5 MG Tab Take 1 Tab by mouth every day. 30 Tab 0   • [DISCONTINUED] carvedilol (COREG) 6.25 MG Tab Take 1 Tab by mouth 2 times a day, with meals. 60 Tab 0   • [DISCONTINUED] atorvastatin (LIPITOR) 80 MG tablet Take 80 mg by mouth every day.       No facility-administered encounter medications on file as of 9/13/2017.   "    Review of Systems   Constitutional: Negative for chills, fever, malaise/fatigue and weight loss.   HENT: Negative for ear discharge, ear pain, hearing loss and nosebleeds.    Eyes: Negative for blurred vision, double vision, pain and discharge.   Respiratory: Positive for shortness of breath. Negative for cough.    Cardiovascular: Negative for chest pain, palpitations, orthopnea, claudication, leg swelling and PND.   Gastrointestinal: Negative for abdominal pain, blood in stool, melena, nausea and vomiting.   Genitourinary: Negative for dysuria and hematuria.   Musculoskeletal: Negative for falls, joint pain and myalgias.   Skin: Negative for itching and rash.   Neurological: Negative for dizziness, sensory change, speech change, loss of consciousness and headaches.   Endo/Heme/Allergies: Negative for environmental allergies. Does not bruise/bleed easily.   Psychiatric/Behavioral: Negative for depression, hallucinations and suicidal ideas.        Objective:   BP (!) 90/50   Pulse 76   Ht 1.753 m (5' 9.02\")   Wt 71.2 kg (157 lb)   SpO2 90%   BMI 23.17 kg/m²      Physical Exam   Constitutional: He is oriented to person, place, and time. No distress.   HENT:   Head: Normocephalic and atraumatic.   Eyes: EOM are normal.   Neck: Normal range of motion. No JVD present.   Cardiovascular: Normal rate, regular rhythm, normal heart sounds and intact distal pulses.  Exam reveals no gallop and no friction rub.    No murmur heard.  Bilateral femoral pulses are 2+, bilateral dorsalis pedis pulses are 2+, bilateral posterior tibialis pulses are 2+.   Pulmonary/Chest: No respiratory distress. He has no wheezes. He has no rales. He exhibits no tenderness.   Abdominal: Soft. Bowel sounds are normal. There is no tenderness. There is no rebound and no guarding.   The is no presence of abdominal bruits   Musculoskeletal: Normal range of motion.   Neurological: He is alert and oriented to person, place, and time.   Skin: Skin is " warm and dry.   Psychiatric: He has a normal mood and affect.   Nursing note and vitals reviewed.      Assessment:     1. Stented coronary artery  BASIC METABOLIC PANEL    LIPID PANEL   2. ACC/AHA stage C systolic heart failure (CMS-HCC)  BASIC METABOLIC PANEL    LIPID PANEL   3. Left ventricular systolic dysfunction, NYHA class 3  BASIC METABOLIC PANEL    LIPID PANEL   4. Ischemic cardiomyopathy  BASIC METABOLIC PANEL    LIPID PANEL   5. Alcohol use  BASIC METABOLIC PANEL    LIPID PANEL       Medical Decision Making:  Today's Assessment / Status / Plan:   Today, based on physical examination findings, patient is euvolemic. No JVD, lungs are clear to auscultation, no pitting edema in bilateral lower extremities, no ascites.    At this time patient is borderline hypotensive. I will hold his Lasix therapy.  Continue carvedilol, lisinopril, aspirin, prasugrel.        Baldo Fajardo M.D.  [address] [facility] [address]  VIA Facsimile: [phone]

## 2017-10-03 DIAGNOSIS — I50.20 ACC/AHA STAGE C SYSTOLIC HEART FAILURE (HCC): ICD-10-CM

## 2017-10-03 DIAGNOSIS — Z78.9 ALCOHOL USE: ICD-10-CM

## 2017-10-03 DIAGNOSIS — I51.89 LEFT VENTRICULAR SYSTOLIC DYSFUNCTION, NYHA CLASS 3: ICD-10-CM

## 2017-10-03 DIAGNOSIS — I71.40 ABDOMINAL AORTIC ANEURYSM WITHOUT RUPTURE (HCC): ICD-10-CM

## 2017-10-03 DIAGNOSIS — I25.5 ISCHEMIC CARDIOMYOPATHY: ICD-10-CM

## 2017-10-03 DIAGNOSIS — Z95.5 STENTED CORONARY ARTERY: ICD-10-CM

## 2017-10-04 ENCOUNTER — TELEPHONE (OUTPATIENT)
Dept: CARDIOLOGY | Facility: MEDICAL CENTER | Age: 72
End: 2017-10-04

## 2017-10-04 NOTE — TELEPHONE ENCOUNTER
"review of meds   Received: Yesterday   Message Contents   Pilar Baeza RDAVONTE.   Phone Number: [phone]                 TT/mary beth     Pt calling to have you review all meds that he is supposed to be taking at this time. He also asks for the purpose of why he is on each med. Please call pt at       Returned call. Patient in bed. I offer to call back. Pts spouse says, \"NO, hold on please.\" She then comes back to the phone and asks if he can call me back. I said of course. She asks, \"What is your phone number anyways.\" I provide it and offer just have him ask for Mary Beth. She says \"Who?\" x 3. \"OK have a good day\" she says and hangs up the phone.       returning your call   Received: Today   Message Contents   Qi Baeza RDAVONTE.             TT/Mary Beth       Patient is returning your call this morning. He can be reached at [phone].      Pt states he has a list of meds, that he is unaware of what he should be taking, he states he saw his PCP yesterday and PCP made changes to medications, did EKG and CXR and pt wants to discuss medications and what he should be taking. Pt states he is confused about his medications. We discuss, effient Rx, mag oxide and folvite, (pt asked to call PCP about folvite and mag oxide), Pt states PCP told him to take Coreg once daily. Pt asks about gabapentin, explained to patient that PCP should advise. Pt states he is confused about why PCP would stop that if he didn't order it. Pt has questions about what position to sleep in. If he can use marijuana to stop smoking. Pt advised to make an appt to come in to see TT and ask remaining questions and get all questions answered. PT states he was told TT was not available. Pt made an appt for 10/11 at 3:45.  "

## 2017-10-04 NOTE — TELEPHONE ENCOUNTER
"[facility]/PHARMACY #[id number] - [facility] [address] [phone] (Phone)     Called. They state Effient not covered at 30 or 90 days. They give me [phone] to call for prior auth. Amy pharmacy management called. No prior auth needed. [employer] states as long as it is processed as a 1/1 it is covered. Meaning 1 tab per day administration. She states pharmacy has not attempted to process since 10/1.    Pharmacy called back and informed of above. Pharmacist states he did try to process today and was denied. He states he will run again but it very obviously frustrated. I asked how will I know if our patient receives his refill or not. He asks if I want to hold while he calls the insurance company.      Pharmacist returns to the line and states \"I talked to the insurance company and it went through now\", I asked if he will call patient and notify, he states he will.      "

## 2017-10-11 ENCOUNTER — OFFICE VISIT (OUTPATIENT)
Dept: CARDIOLOGY | Facility: MEDICAL CENTER | Age: 72
End: 2017-10-11
Payer: MEDICARE

## 2017-10-11 VITALS
SYSTOLIC BLOOD PRESSURE: 116 MMHG | OXYGEN SATURATION: 98 % | BODY MASS INDEX: 23.25 KG/M2 | HEART RATE: 72 BPM | DIASTOLIC BLOOD PRESSURE: 62 MMHG | WEIGHT: 157 LBS | HEIGHT: 69 IN

## 2017-10-11 DIAGNOSIS — I25.5 ISCHEMIC CARDIOMYOPATHY: ICD-10-CM

## 2017-10-11 DIAGNOSIS — I50.20 ACC/AHA STAGE C SYSTOLIC HEART FAILURE (HCC): ICD-10-CM

## 2017-10-11 DIAGNOSIS — F10.10 ETOH ABUSE: ICD-10-CM

## 2017-10-11 DIAGNOSIS — I49.1 PREMATURE ATRIAL COMPLEXES: ICD-10-CM

## 2017-10-11 DIAGNOSIS — I51.89 LEFT VENTRICULAR SYSTOLIC DYSFUNCTION, NYHA CLASS 3: ICD-10-CM

## 2017-10-11 DIAGNOSIS — I71.40 ABDOMINAL ANEURYSM (HCC): ICD-10-CM

## 2017-10-11 PROCEDURE — 99214 OFFICE O/P EST MOD 30 MIN: CPT | Performed by: INTERNAL MEDICINE

## 2017-10-11 RX ORDER — CARVEDILOL 6.25 MG/1
6.25 TABLET ORAL 2 TIMES DAILY WITH MEALS
Qty: 180 TAB | Refills: 3 | Status: SHIPPED | OUTPATIENT
Start: 2017-10-11 | End: 2018-07-06

## 2017-10-11 RX ORDER — FLUTICASONE PROPIONATE 50 MCG
SPRAY, SUSPENSION (ML) NASAL
Refills: 2 | COMMUNITY
Start: 2017-09-19 | End: 2019-06-26

## 2017-10-11 RX ORDER — FUROSEMIDE 20 MG/1
TABLET ORAL
Refills: 0 | COMMUNITY
Start: 2017-09-19 | End: 2018-02-14

## 2017-10-11 ASSESSMENT — ENCOUNTER SYMPTOMS
BRUISES/BLEEDS EASILY: 0
ORTHOPNEA: 0
MYALGIAS: 0
PALPITATIONS: 0
CLAUDICATION: 0
SENSORY CHANGE: 0
DEPRESSION: 0
NAUSEA: 0
FEVER: 0
BLURRED VISION: 0
SPEECH CHANGE: 0
ABDOMINAL PAIN: 0
WEIGHT LOSS: 0
VOMITING: 0
BLOOD IN STOOL: 0
DIZZINESS: 0
COUGH: 0
EYE PAIN: 0
HALLUCINATIONS: 0
CHILLS: 0
FALLS: 0
LOSS OF CONSCIOUSNESS: 0
PND: 0
DOUBLE VISION: 0
HEADACHES: 0
SHORTNESS OF BREATH: 0
EYE DISCHARGE: 0

## 2017-10-11 NOTE — PROGRESS NOTES
Subjective:   Khai Munoz is a 71 y.o. male who presents today For cardiac care after his recent hospitalization due to non-ST elevation myocardial infarction episode. Patient underwent PCI and stenting of his proximal LAD. He also has a history of peripheral vascular disease for which he underwent AAA repair in February 2017. Patient is doing okay right now. No chest pain. He does get winded with daily living activities. His left ventricular systolic function dropped to 35% after his myocardial infarction episode. He is currently still drinking and smoking.    Patient still gets winded with daily living activities and exertion. No symptoms at rest.    Past Medical History:   Diagnosis Date   • Cancer (CMS-HCC) 2006    prostate   • Arrhythmia     afib   • Asthma     as a child   • Breath shortness    • Chronic cough    • Dental disorder     dental implants, bottom dentures implanted, upper, click on   • Emphysema of lung (CMS-HCC)    • High cholesterol    • Psychiatric problem     anxiety and depression   • 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CMS-HCC)    • Tinnitus    • Urinary bladder disorder     history of UTI     Past Surgical History:   Procedure Laterality Date   • AAA WITH STENT GRAFT  2/15/2017    Procedure: AAA WITH STENT GRAFT FOR: ENDOVASCULAR REPAIR OF INFRARENAL ABDOMINAL AORTIC ANEURYSM USING TWO DOCKING LIMBS;  Surgeon: Calvin Paulino M.D.;  Location: SURGERY [facility];  Service:    • CYST EXCISION      scrotal   • OTHER      brachy therapy and radiation for prostate ca   • OTHER      dental implants     Family History   Problem Relation Age of Onset   • Heart Disease Mother      History   Smoking Status   • Current Every Day Smoker   • Packs/day: 0.50   • Years: 50.00   • Types: Cigarettes   Smokeless Tobacco   • Never Used     Allergies   Allergen Reactions   • Declomycin      Reaction a long time ago     Outpatient Encounter Prescriptions as of 10/11/2017   Medication Sig Dispense Refill   •  fluticasone (FLONASE) 50 MCG/ACT nasal spray PLACE 1 SPRAY INTO THE NOSTRIL(S) 2 (TWO) TIMES DAILY  2   • carvedilol (COREG) 6.25 MG Tab Take 1 Tab by mouth 2 times a day, with meals. 180 Tab 3   • prasugrel (EFFIENT) 10 MG Tab Take 1 Tab by mouth every day. 90 Tab 3   • lisinopril (PRINIVIL) 5 MG Tab Take 1 Tab by mouth every day. 90 Tab 3   • atorvastatin (LIPITOR) 80 MG tablet Take 1 Tab by mouth every day. 90 Tab 3   • magnesium oxide (MAG-OX) 400 (241.3 Mg) MG Tab tablet Take 1 Tab by mouth every day. 30 Tab 0   • thiamine (THIAMINE) 100 MG tablet Take 1 Tab by mouth every day. 90 Tab 3   • folic acid (FOLVITE) 1 MG Tab Take 1 Tab by mouth every day. 30 Tab 0   • tamsulosin (FLOMAX) 0.4 MG capsule Take 0.8 mg by mouth every bedtime.     • SPIRIVA HANDIHALER 18 MCG Cap Inhale 1 Cap by mouth every day.  3   • ADVAIR DISKUS 250-50 MCG/DOSE AEROSOL POWDER, BREATH ACTIVATED Take 1 Puff by mouth 2 Times a Day.  0   • aspirin 81 MG tablet Take 81 mg by mouth every day.     • furosemide (LASIX) 20 MG Tab TAKE 0.5 TABS BY MOUTH ONCE DAILY  0   • [DISCONTINUED] carvedilol (COREG) 6.25 MG Tab Take 1 Tab by mouth 2 times a day, with meals. (Patient taking differently: Take 6.25 mg by mouth every day.) 180 Tab 3   • gabapentin (NEURONTIN) 100 MG Cap Take 1 Cap by mouth 3 times a day. 90 Cap 0   • pyridoxine 100 MG tablet Take 100 mg by mouth every day. Vitamin B-6       No facility-administered encounter medications on file as of 10/11/2017.      Review of Systems   Constitutional: Negative for chills, fever, malaise/fatigue and weight loss.   HENT: Negative for ear discharge, ear pain, hearing loss and nosebleeds.    Eyes: Negative for blurred vision, double vision, pain and discharge.   Respiratory: Negative for cough and shortness of breath.    Cardiovascular: Negative for chest pain, palpitations, orthopnea, claudication, leg swelling and PND.   Gastrointestinal: Negative for abdominal pain, blood in stool, melena,  "nausea and vomiting.   Genitourinary: Negative for dysuria and hematuria.   Musculoskeletal: Negative for falls, joint pain and myalgias.   Skin: Negative for itching and rash.   Neurological: Negative for dizziness, sensory change, speech change, loss of consciousness and headaches.   Endo/Heme/Allergies: Negative for environmental allergies. Does not bruise/bleed easily.   Psychiatric/Behavioral: Negative for depression, hallucinations and suicidal ideas.        Objective:   /62   Pulse 72   Ht 1.753 m (5' 9\")   Wt 71.2 kg (157 lb)   SpO2 98%   BMI 23.18 kg/m²     Physical Exam   Constitutional: He is oriented to person, place, and time. No distress.   HENT:   Head: Normocephalic and atraumatic.   Eyes: EOM are normal.   Neck: Normal range of motion. No JVD present.   Cardiovascular: Normal rate, regular rhythm, normal heart sounds and intact distal pulses.  Exam reveals no gallop and no friction rub.    No murmur heard.  Bilateral femoral pulses are 2+, bilateral dorsalis pedis pulses are 2+, bilateral posterior tibialis pulses are 2+.   Pulmonary/Chest: No respiratory distress. He has no wheezes. He has no rales. He exhibits no tenderness.   Abdominal: Soft. Bowel sounds are normal. There is no tenderness. There is no rebound and no guarding.   The is no presence of abdominal bruits   Musculoskeletal: Normal range of motion.   Neurological: He is alert and oriented to person, place, and time.   Skin: Skin is warm and dry.   Psychiatric: He has a normal mood and affect.   Nursing note and vitals reviewed.      Assessment:     1. Premature atrial complexes  carvedilol (COREG) 6.25 MG Tab   2. ACC/AHA stage C systolic heart failure (CMS-HCC)  carvedilol (COREG) 6.25 MG Tab   3. Left ventricular systolic dysfunction, NYHA class 3  carvedilol (COREG) 6.25 MG Tab   4. Ischemic cardiomyopathy     5. ETOH abuse     6. Abdominal aneurysm (CMS-HCC)         Medical Decision Making:  Today's Assessment / Status / " Plan:   Today, based on physical examination findings, patient is euvolemic. No JVD, lungs are clear to auscultation, no pitting edema in bilateral lower extremities, no ascites.    Will increase Coreg to 6.25 mg po bid.  Continue Lisinopril 5 mg po daily.  Continue ASA, Plavix, Atorvastatin.  Will add aldactone once ACE-I and BB therapy are optimized.    Advise on stopping ETOH use, tobacco use.    Not sure he is a good candidate for ICD due to ongoing drinking problem.    I will see patient back in our Heart Failure Clinic with lab tests and studies results in 12 weeks per patient's request due to winter weather.    I thank you Dr. Fajardo for referring patient to our Heart Failure Clinic today.

## 2017-10-11 NOTE — LETTER
[facility]   [address], [address]  [place] [address]  Phone: [phone]  Fax: [phone]              Khai Munoz  1945    Encounter Date: 10/11/2017    James Davila M.D.          PROGRESS NOTE:  Subjective:   Khai Munoz is a 71 y.o. male who presents today For cardiac care after his recent hospitalization due to non-ST elevation myocardial infarction episode. Patient underwent PCI and stenting of his proximal LAD. He also has a history of peripheral vascular disease for which he underwent AAA repair in February 2017. Patient is doing okay right now. No chest pain. He does get winded with daily living activities. His left ventricular systolic function dropped to 35% after his myocardial infarction episode. He is currently still drinking and smoking.    Patient still gets winded with daily living activities and exertion. No symptoms at rest.    Past Medical History:   Diagnosis Date   • Cancer (CMS-HCC) 2006    prostate   • Arrhythmia     afib   • Asthma     as a child   • Breath shortness    • Chronic cough    • Dental disorder     dental implants, bottom dentures implanted, upper, click on   • Emphysema of lung (CMS-HCC)    • High cholesterol    • Psychiatric problem     anxiety and depression   • 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CMS-HCC)    • Tinnitus    • Urinary bladder disorder     history of UTI     Past Surgical History:   Procedure Laterality Date   • AAA WITH STENT GRAFT  2/15/2017    Procedure: AAA WITH STENT GRAFT FOR: ENDOVASCULAR REPAIR OF INFRARENAL ABDOMINAL AORTIC ANEURYSM USING TWO DOCKING LIMBS;  Surgeon: Calvin Paulino M.D.;  Location: SURGERY [facility];  Service:    • CYST EXCISION      scrotal   • OTHER      brachy therapy and radiation for prostate ca   • OTHER      dental implants     Family History   Problem Relation Age of Onset   • Heart Disease Mother      History   Smoking Status   • Current Every Day Smoker   •  Packs/day: 0.50   • Years: 50.00   • Types: Cigarettes   Smokeless Tobacco   • Never Used     Allergies   Allergen Reactions   • Declomycin      Reaction a long time ago     Outpatient Encounter Prescriptions as of 10/11/2017   Medication Sig Dispense Refill   • fluticasone (FLONASE) 50 MCG/ACT nasal spray PLACE 1 SPRAY INTO THE NOSTRIL(S) 2 (TWO) TIMES DAILY  2   • carvedilol (COREG) 6.25 MG Tab Take 1 Tab by mouth 2 times a day, with meals. 180 Tab 3   • prasugrel (EFFIENT) 10 MG Tab Take 1 Tab by mouth every day. 90 Tab 3   • lisinopril (PRINIVIL) 5 MG Tab Take 1 Tab by mouth every day. 90 Tab 3   • atorvastatin (LIPITOR) 80 MG tablet Take 1 Tab by mouth every day. 90 Tab 3   • magnesium oxide (MAG-OX) 400 (241.3 Mg) MG Tab tablet Take 1 Tab by mouth every day. 30 Tab 0   • thiamine (THIAMINE) 100 MG tablet Take 1 Tab by mouth every day. 90 Tab 3   • folic acid (FOLVITE) 1 MG Tab Take 1 Tab by mouth every day. 30 Tab 0   • tamsulosin (FLOMAX) 0.4 MG capsule Take 0.8 mg by mouth every bedtime.     • SPIRIVA HANDIHALER 18 MCG Cap Inhale 1 Cap by mouth every day.  3   • ADVAIR DISKUS 250-50 MCG/DOSE AEROSOL POWDER, BREATH ACTIVATED Take 1 Puff by mouth 2 Times a Day.  0   • aspirin 81 MG tablet Take 81 mg by mouth every day.     • furosemide (LASIX) 20 MG Tab TAKE 0.5 TABS BY MOUTH ONCE DAILY  0   • [DISCONTINUED] carvedilol (COREG) 6.25 MG Tab Take 1 Tab by mouth 2 times a day, with meals. (Patient taking differently: Take 6.25 mg by mouth every day.) 180 Tab 3   • gabapentin (NEURONTIN) 100 MG Cap Take 1 Cap by mouth 3 times a day. 90 Cap 0   • pyridoxine 100 MG tablet Take 100 mg by mouth every day. Vitamin B-6       No facility-administered encounter medications on file as of 10/11/2017.      Review of Systems   Constitutional: Negative for chills, fever, malaise/fatigue and weight loss.   HENT: Negative for ear discharge, ear pain, hearing loss and nosebleeds.    Eyes: Negative for blurred vision, double  "vision, pain and discharge.   Respiratory: Negative for cough and shortness of breath.    Cardiovascular: Negative for chest pain, palpitations, orthopnea, claudication, leg swelling and PND.   Gastrointestinal: Negative for abdominal pain, blood in stool, melena, nausea and vomiting.   Genitourinary: Negative for dysuria and hematuria.   Musculoskeletal: Negative for falls, joint pain and myalgias.   Skin: Negative for itching and rash.   Neurological: Negative for dizziness, sensory change, speech change, loss of consciousness and headaches.   Endo/Heme/Allergies: Negative for environmental allergies. Does not bruise/bleed easily.   Psychiatric/Behavioral: Negative for depression, hallucinations and suicidal ideas.        Objective:   /62   Pulse 72   Ht 1.753 m (5' 9\")   Wt 71.2 kg (157 lb)   SpO2 98%   BMI 23.18 kg/m²      Physical Exam   Constitutional: He is oriented to person, place, and time. No distress.   HENT:   Head: Normocephalic and atraumatic.   Eyes: EOM are normal.   Neck: Normal range of motion. No JVD present.   Cardiovascular: Normal rate, regular rhythm, normal heart sounds and intact distal pulses.  Exam reveals no gallop and no friction rub.    No murmur heard.  Bilateral femoral pulses are 2+, bilateral dorsalis pedis pulses are 2+, bilateral posterior tibialis pulses are 2+.   Pulmonary/Chest: No respiratory distress. He has no wheezes. He has no rales. He exhibits no tenderness.   Abdominal: Soft. Bowel sounds are normal. There is no tenderness. There is no rebound and no guarding.   The is no presence of abdominal bruits   Musculoskeletal: Normal range of motion.   Neurological: He is alert and oriented to person, place, and time.   Skin: Skin is warm and dry.   Psychiatric: He has a normal mood and affect.   Nursing note and vitals reviewed.      Assessment:     1. Premature atrial complexes  carvedilol (COREG) 6.25 MG Tab   2. ACC/AHA stage C systolic heart failure (CMS-HCC)  " carvedilol (COREG) 6.25 MG Tab   3. Left ventricular systolic dysfunction, NYHA class 3  carvedilol (COREG) 6.25 MG Tab   4. Ischemic cardiomyopathy     5. ETOH abuse     6. Abdominal aneurysm (CMS-HCC)         Medical Decision Making:  Today's Assessment / Status / Plan:   Today, based on physical examination findings, patient is euvolemic. No JVD, lungs are clear to auscultation, no pitting edema in bilateral lower extremities, no ascites.    Will increase Coreg to 6.25 mg po bid.  Continue Lisinopril 5 mg po daily.  Continue ASA, Plavix, Atorvastatin.  Will add aldactone once ACE-I and BB therapy are optimized.    Advise on stopping ETOH use, tobacco use.    Not sure he is a good candidate for ICD due to ongoing drinking problem.    I will see patient back in our Heart Failure Clinic with lab tests and studies results in 12 weeks per patient's request due to winter weather.    I thank you Dr. Fajardo for referring patient to our Heart Failure Clinic today.        Baldo Fajardo M.D.  [address] [facility] [address]  VIA Facsimile: [phone]

## 2017-10-30 DIAGNOSIS — I25.5 ISCHEMIC CARDIOMYOPATHY: ICD-10-CM

## 2018-01-29 ENCOUNTER — TELEPHONE (OUTPATIENT)
Dept: CARDIOLOGY | Facility: MEDICAL CENTER | Age: 73
End: 2018-01-29

## 2018-01-29 NOTE — TELEPHONE ENCOUNTER
Called spoke w/ pt. To remind to have labs done prior to appt with TT.  Pt. [facility]es to [facility] to [facility]. Mailed req to pt.

## 2018-02-07 ENCOUNTER — TELEPHONE (OUTPATIENT)
Dept: CARDIOLOGY | Facility: MEDICAL CENTER | Age: 73
End: 2018-02-07

## 2018-02-07 NOTE — TELEPHONE ENCOUNTER
Question about where to go for lab work   Received: Today   Message Contents   Dot Baeza R.N.             TT/Chelsie     Patient has a lab order, but wants to find out where he's suppose to go? He wants a call back at [phone].      Returned call. Pt reassured he can take order to any lab-pt asks if he can go to [facility]. Pt reassured that it is non-fasting and blood work only. Pt appreciates call back.

## 2018-02-07 NOTE — TELEPHONE ENCOUNTER
Question about different medication to take   Received: Today   Message Contents   Dot Baeza R.N.             TT/Chelsie     Patient said that he can no longer take Effient and wants to find out what other medication he can take. He can be reached at [phone].      Pts pharmacist called. Pt has $243 deductible on this Rx this month.    Pt called and informed. Pt states this doesn't make sense. Pt encouraged to call his pharmacist for further explanation. Attempted to explain Medicare deductible, pt still not accepting of explanation.  Patient question   Received: Today   Message Contents   Jerald Mir, Med Ass't  Chelsie Baeza R.N.             Hi Chelsie,     TT Pt wanted to know if it is ok if he waits till 3/28 to be seen or if he needs to be seen sooner. He is unable to make it in the mornings as he is coming from [place].     Thanks,   Jerald [phone] Ednae [phone]      Pt advised that he was due back 1/11 and pushing appt back to 3/28 is not advised. Pt asked if he would be open to seeing APRN, pt states that is fine. Pt transferred to . Per schedule, pt scheduled for 2/14 with APRN.

## 2018-02-14 ENCOUNTER — OFFICE VISIT (OUTPATIENT)
Dept: CARDIOLOGY | Facility: MEDICAL CENTER | Age: 73
End: 2018-02-14
Payer: MEDICARE

## 2018-02-14 VITALS
BODY MASS INDEX: 22.22 KG/M2 | WEIGHT: 150 LBS | OXYGEN SATURATION: 97 % | DIASTOLIC BLOOD PRESSURE: 64 MMHG | SYSTOLIC BLOOD PRESSURE: 122 MMHG | HEART RATE: 82 BPM | HEIGHT: 69 IN

## 2018-02-14 DIAGNOSIS — Z95.5 STENTED CORONARY ARTERY: ICD-10-CM

## 2018-02-14 DIAGNOSIS — I50.20 ACC/AHA STAGE C SYSTOLIC HEART FAILURE (HCC): ICD-10-CM

## 2018-02-14 DIAGNOSIS — I51.89 LEFT VENTRICULAR SYSTOLIC DYSFUNCTION, NYHA CLASS 3: ICD-10-CM

## 2018-02-14 DIAGNOSIS — I25.5 ISCHEMIC CARDIOMYOPATHY: ICD-10-CM

## 2018-02-14 DIAGNOSIS — Z72.0 TOBACCO ABUSE: ICD-10-CM

## 2018-02-14 DIAGNOSIS — Z78.9 ALCOHOL USE: ICD-10-CM

## 2018-02-14 DIAGNOSIS — E78.2 MIXED HYPERLIPIDEMIA: ICD-10-CM

## 2018-02-14 PROCEDURE — 99214 OFFICE O/P EST MOD 30 MIN: CPT | Performed by: NURSE PRACTITIONER

## 2018-02-14 ASSESSMENT — ENCOUNTER SYMPTOMS
ABDOMINAL PAIN: 0
SHORTNESS OF BREATH: 1
MYALGIAS: 0
PND: 0
DIZZINESS: 1
ORTHOPNEA: 0
COUGH: 0
CLAUDICATION: 0
FEVER: 0
PALPITATIONS: 0

## 2018-02-14 NOTE — LETTER
[facility]   [address] [place] [address]  SINDY Rajan [address]  Phone: [phone]  Fax: [phone]              Khai Munoz  1945    Encounter Date: 2/14/2018    EULALIA Castillo          PROGRESS NOTE:  Subjective:   Khai Munoz is a 72 y.o. male who presents today for follow-up on his heart failure.    Patient of Dr. Davila. Patient was last seen in clinic on 10/11/17. During that visit, patient was instructed to increase his carvedilol to 6.25 mg twice a day. Patient comes in to the clinic reporting that he did not increase his dose and he must have missed the instructions. Patient also reports his PCP discontinued his lisinopril because patient was complaining that he was on too many medications.    Patient continues to drink about 2 shots every few days and he is cutting back on his smoking smoke about 5 cigarettes per day.    For symptoms, patient does report feeling winded with exertion. Patient denies shortness of breath with ADLs, because he reports he doesn't do much at home. Patient does have occasional dizziness. Patient denies chest pain, shortness of breath at rest, palpitations, orthopnea, PND or Edema.     Patient doesn't weight himself regularly at home.    Patient has ischemic cardiomyopathy with an EF of 35% with a Stent to the LAD on 9/1/17    Pt also has some CKD  Past Medical History:   Diagnosis Date   • Arrhythmia     afib   • Asthma     as a child   • Breath shortness    • Cancer (CMS-HCC) 2006    prostate   • Chronic cough    • Dental disorder     dental implants, bottom dentures implanted, upper, click on   • Emphysema of lung (CMS-HCC)    • High cholesterol    • Psychiatric problem     anxiety and depression   • 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CMS-HCC)    • Tinnitus    • Urinary bladder disorder     history of UTI     Past Surgical History:   Procedure Laterality Date   • ANGIOPLASTY  09/2017   • AAA WITH STENT GRAFT  2/15/2017    Procedure: AAA WITH STENT GRAFT FOR: ENDOVASCULAR REPAIR OF INFRARENAL ABDOMINAL AORTIC ANEURYSM USING TWO DOCKING LIMBS;  Surgeon: Calvin Paulino M.D.;  Location: SURGERY [facility];  Service:    • CYST EXCISION      scrotal   • OTHER      brachy therapy and radiation for prostate ca   • OTHER      dental implants     Family History   Problem Relation Age of Onset   • Heart Disease Mother    • GI Sister    • Diabetes Sister      History   Smoking Status   • Current Every Day Smoker   • Packs/day: 0.25   • Years: 50.00   • Types: Cigarettes   Smokeless Tobacco   • Never Used     Allergies   Allergen Reactions   • Declomycin      Reaction a long time ago     Outpatient Encounter Prescriptions as of 2/14/2018   Medication Sig Dispense Refill   • fluticasone (FLONASE) 50 MCG/ACT nasal spray PLACE 1 SPRAY INTO THE NOSTRIL(S) 2 (TWO) TIMES DAILY  2   • carvedilol (COREG) 6.25 MG Tab Take 1 Tab by mouth 2 times a day, with meals. 180 Tab 3   • prasugrel (EFFIENT) 10 MG Tab Take 1 Tab by mouth every day. 90 Tab 3   • lisinopril (PRINIVIL) 5 MG Tab Take 1 Tab by mouth every day. 90 Tab 3   • atorvastatin (LIPITOR) 80 MG tablet Take 1 Tab by mouth every day. 90 Tab 3   • thiamine (THIAMINE) 100 MG tablet Take 1 Tab by mouth every day. 90 Tab 3   • tamsulosin (FLOMAX) 0.4 MG capsule Take 0.8 mg by mouth every bedtime.     • SPIRIVA HANDIHALER 18 MCG Cap Inhale 1 Cap by mouth every day.  3   • ADVAIR DISKUS 250-50 MCG/DOSE AEROSOL POWDER, BREATH ACTIVATED Take 1 Puff by mouth 2 Times a Day.  0   • pyridoxine 100 MG tablet Take 100 mg by mouth every day. Vitamin B-6     • aspirin 81 MG tablet Take 81 mg by mouth every day.     • [DISCONTINUED] furosemide (LASIX) 20 MG Tab TAKE 0.5 TABS BY MOUTH ONCE DAILY  0   • [DISCONTINUED] magnesium oxide (MAG-OX) 400 (241.3 Mg) MG Tab tablet Take 1 Tab by mouth every day. 30 Tab 0   • [DISCONTINUED] gabapentin (NEURONTIN) 100 MG Cap Take 1 Cap by mouth 3 times a day. 90 Cap 0   •  "[DISCONTINUED] folic acid (FOLVITE) 1 MG Tab Take 1 Tab by mouth every day. 30 Tab 0     No facility-administered encounter medications on file as of 2/14/2018.      Review of Systems   Constitutional: Negative for fever and malaise/fatigue.   Respiratory: Positive for shortness of breath. Negative for cough.    Cardiovascular: Negative for chest pain, palpitations, orthopnea, claudication, leg swelling and PND.   Gastrointestinal: Negative for abdominal pain.   Musculoskeletal: Positive for joint pain (hip pain). Negative for myalgias.   Neurological: Positive for dizziness (occ).   All other systems reviewed and are negative.       Objective:   /64   Pulse 82   Ht 1.753 m (5' 9\")   Wt 68 kg (150 lb)   SpO2 97%   BMI 22.15 kg/m²      Physical Exam   Constitutional: He is oriented to person, place, and time. He appears well-developed and well-nourished.   HENT:   Head: Normocephalic and atraumatic.   Eyes: EOM are normal.   Neck: Normal range of motion. Neck supple. No JVD present.   Cardiovascular: Normal rate, regular rhythm, normal heart sounds and intact distal pulses.    No murmur heard.  Pulmonary/Chest: Effort normal and breath sounds normal. No respiratory distress. He has no wheezes. He has no rales.   Abdominal: Soft. Bowel sounds are normal.   Musculoskeletal: Normal range of motion. He exhibits no edema.   Neurological: He is alert and oriented to person, place, and time.   Skin: Skin is warm and dry.   Psychiatric: He has a normal mood and affect.   Nursing note and vitals reviewed.    No results found for: CHOLSTRLTOT, LDL, HDL, TRIGLYCERIDE    Lab Results   Component Value Date/Time    SODIUM 136 09/05/2017 02:26 AM    POTASSIUM 3.7 09/05/2017 02:26 AM    CHLORIDE 107 09/05/2017 02:26 AM    CO2 22 09/05/2017 02:26 AM    GLUCOSE 98 09/05/2017 02:26 AM    BUN 21 09/05/2017 02:26 AM    CREATININE 1.91 (H) 09/05/2017 02:26 AM     Lab Results   Component Value Date/Time    ALKPHOSPHAT 17 (L) " "09/04/2017 01:46 AM    ASTSGOT 43 09/04/2017 01:46 AM    ALTSGPT 23 09/04/2017 01:46 AM    TBILIRUBIN 0.7 09/04/2017 01:46 AM      Transthoracic Echo Report 1/23/17  No prior study is available for comparison.   Mitral annular calcification. Heavily calcified anterior mitral   leaflet. No mitral stenosis. Moderate eccentric mitral regurgitation.  Structurally normal aortic valve. Aortic sclerosis without stenosis.   Calcified aortic valve leaflets. No aortic insufficiency.  Normal pericardium without effusion.    Transthoracic Echo Report 9/1/17  Left ventricular ejection fraction is visually estimated to be 35%.    Wall motion abnormalities as described.  There is eccentric, posteriorly directed mitral regurgitation which is   moderate to severe.  Moderate tricuspid regurgitation.  Right ventricular systolic pressure is estimated to be 60 mmHg.      Heart cath 9/1/17  IMPRESSION:  1.  Successful stenting of subtotal stenosis of the proximal left anterior descending with a 3.5x18 mm Xience Alpine stent.  2.  A 40% lesion of the proximal right coronary artery.  3.  A 40% lesion in the mid obtuse marginal artery.  4.  Stenosis at the origin of a \"jailed\" diagonal artery.  This lesion could not be angioplastied as it would not pass through the side of the stent.  5.  Elevated LVEDP.  6.  LV dysfunction with EF in the 45% range.    Labs in Media Reviewed with patient from 2/8/18    Assessment:     1. ACC/AHA stage C systolic heart failure (CMS-HCC)  BASIC METABOLIC PANEL   2. Left ventricular systolic dysfunction, NYHA class 3  BASIC METABOLIC PANEL   3. Ischemic cardiomyopathy     4. Stented coronary artery     5. Mixed hyperlipidemia     6. Alcohol use     7. Tobacco abuse       Medical Decision Making:  Today's Assessment / Status / Plan:   1. HFrEF, stage C, class 2-3: Based on physical examination findings, patient is euvolemic. No JVD, lungs are clear to auscultation, no pitting edema in bilateral lower " extremities, no ascites.  -Increase carvedilol to 6.25 mg twice a day  -Restart lisinopril 5 mg daily  -BMP in 1 week ( will monitor kidney function, Crt 1.39 on 2/8/18 labs in Media tab)  -Monitor blood pressure and heart rate if problems or concerns or symptoms please contact our office or go to the emergency room  -Will add Aldactone once ACE-I and BB are optimized  -Reinforced s/sx of worsening heart failure with patient and weight monitoring. Pt verbalizes understanding. Pt to call office or RTC if present.     2. CAD, s/p Stent to LAD/ HLD:   -Continue aspirin 81 mg daily  -Continue atorvastatin 80 mg nightly  -Continue Effient 10 mg daily    3. Alcohol and tobacco abuse  -Encourage patient to stop drinking and to stop smoking.  -Discussed the effects of alcohol and smoking on his heart    FU in clinic in 2 weeks with labs. Sooner if needed.    Patient verbalizes understanding and agrees with the plan of care.     Collaborating MD: MD Baldo Manriquez M.D.  [address] [address] [facility] [address]  VIA Facsimile: [phone]

## 2018-02-15 NOTE — PROGRESS NOTES
Subjective:   Khai Munoz is a 72 y.o. male who presents today for follow-up on his heart failure.    Patient of Dr. Davila. Patient was last seen in clinic on 10/11/17. During that visit, patient was instructed to increase his carvedilol to 6.25 mg twice a day. Patient comes in to the clinic reporting that he did not increase his dose and he must have missed the instructions. Patient also reports his PCP discontinued his lisinopril because patient was complaining that he was on too many medications.    Patient continues to drink about 2 shots every few days and he is cutting back on his smoking smoke about 5 cigarettes per day.    For symptoms, patient does report feeling winded with exertion. Patient denies shortness of breath with ADLs, because he reports he doesn't do much at home. Patient does have occasional dizziness. Patient denies chest pain, shortness of breath at rest, palpitations, orthopnea, PND or Edema.     Patient doesn't weight himself regularly at home.    Patient has ischemic cardiomyopathy with an EF of 35% with a Stent to the LAD on 9/1/17    Pt also has some CKD  Past Medical History:   Diagnosis Date   • Arrhythmia     afib   • Asthma     as a child   • Breath shortness    • Cancer (CMS-[facility]) 2006    prostate   • Chronic cough    • Dental disorder     dental implants, bottom dentures implanted, upper, click on   • Emphysema of lung (CMS-HCC)    • High cholesterol    • Psychiatric problem     anxiety and depression   • 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CMS-HCC)    • Tinnitus    • Urinary bladder disorder     history of UTI     Past Surgical History:   Procedure Laterality Date   • ANGIOPLASTY  09/2017   • AAA WITH STENT GRAFT  2/15/2017    Procedure: AAA WITH STENT GRAFT FOR: ENDOVASCULAR REPAIR OF INFRARENAL ABDOMINAL AORTIC ANEURYSM USING TWO DOCKING LIMBS;  Surgeon: Calvin Paulino M.D.;  Location: SURGERY [facility];  Service:    • CYST EXCISION      scrotal   • OTHER      brachy therapy and  radiation for prostate ca   • OTHER      dental implants     Family History   Problem Relation Age of Onset   • Heart Disease Mother    • GI Sister    • Diabetes Sister      History   Smoking Status   • Current Every Day Smoker   • Packs/day: 0.25   • Years: 50.00   • Types: Cigarettes   Smokeless Tobacco   • Never Used     Allergies   Allergen Reactions   • Declomycin      Reaction a long time ago     Outpatient Encounter Prescriptions as of 2/14/2018   Medication Sig Dispense Refill   • fluticasone (FLONASE) 50 MCG/ACT nasal spray PLACE 1 SPRAY INTO THE NOSTRIL(S) 2 (TWO) TIMES DAILY  2   • carvedilol (COREG) 6.25 MG Tab Take 1 Tab by mouth 2 times a day, with meals. 180 Tab 3   • prasugrel (EFFIENT) 10 MG Tab Take 1 Tab by mouth every day. 90 Tab 3   • lisinopril (PRINIVIL) 5 MG Tab Take 1 Tab by mouth every day. 90 Tab 3   • atorvastatin (LIPITOR) 80 MG tablet Take 1 Tab by mouth every day. 90 Tab 3   • thiamine (THIAMINE) 100 MG tablet Take 1 Tab by mouth every day. 90 Tab 3   • tamsulosin (FLOMAX) 0.4 MG capsule Take 0.8 mg by mouth every bedtime.     • SPIRIVA HANDIHALER 18 MCG Cap Inhale 1 Cap by mouth every day.  3   • ADVAIR DISKUS 250-50 MCG/DOSE AEROSOL POWDER, BREATH ACTIVATED Take 1 Puff by mouth 2 Times a Day.  0   • pyridoxine 100 MG tablet Take 100 mg by mouth every day. Vitamin B-6     • aspirin 81 MG tablet Take 81 mg by mouth every day.     • [DISCONTINUED] furosemide (LASIX) 20 MG Tab TAKE 0.5 TABS BY MOUTH ONCE DAILY  0   • [DISCONTINUED] magnesium oxide (MAG-OX) 400 (241.3 Mg) MG Tab tablet Take 1 Tab by mouth every day. 30 Tab 0   • [DISCONTINUED] gabapentin (NEURONTIN) 100 MG Cap Take 1 Cap by mouth 3 times a day. 90 Cap 0   • [DISCONTINUED] folic acid (FOLVITE) 1 MG Tab Take 1 Tab by mouth every day. 30 Tab 0     No facility-administered encounter medications on file as of 2/14/2018.      Review of Systems   Constitutional: Negative for fever and malaise/fatigue.   Respiratory: Positive for  "shortness of breath. Negative for cough.    Cardiovascular: Negative for chest pain, palpitations, orthopnea, claudication, leg swelling and PND.   Gastrointestinal: Negative for abdominal pain.   Musculoskeletal: Positive for joint pain (hip pain). Negative for myalgias.   Neurological: Positive for dizziness (occ).   All other systems reviewed and are negative.       Objective:   /64   Pulse 82   Ht 1.753 m (5' 9\")   Wt 68 kg (150 lb)   SpO2 97%   BMI 22.15 kg/m²     Physical Exam   Constitutional: He is oriented to person, place, and time. He appears well-developed and well-nourished.   HENT:   Head: Normocephalic and atraumatic.   Eyes: EOM are normal.   Neck: Normal range of motion. Neck supple. No JVD present.   Cardiovascular: Normal rate, regular rhythm, normal heart sounds and intact distal pulses.    No murmur heard.  Pulmonary/Chest: Effort normal and breath sounds normal. No respiratory distress. He has no wheezes. He has no rales.   Abdominal: Soft. Bowel sounds are normal.   Musculoskeletal: Normal range of motion. He exhibits no edema.   Neurological: He is alert and oriented to person, place, and time.   Skin: Skin is warm and dry.   Psychiatric: He has a normal mood and affect.   Nursing note and vitals reviewed.    No results found for: CHOLSTRLTOT, LDL, HDL, TRIGLYCERIDE    Lab Results   Component Value Date/Time    SODIUM 136 09/05/2017 02:26 AM    POTASSIUM 3.7 09/05/2017 02:26 AM    CHLORIDE 107 09/05/2017 02:26 AM    CO2 22 09/05/2017 02:26 AM    GLUCOSE 98 09/05/2017 02:26 AM    BUN 21 09/05/2017 02:26 AM    CREATININE 1.91 (H) 09/05/2017 02:26 AM     Lab Results   Component Value Date/Time    ALKPHOSPHAT 17 (L) 09/04/2017 01:46 AM    ASTSGOT 43 09/04/2017 01:46 AM    ALTSGPT 23 09/04/2017 01:46 AM    TBILIRUBIN 0.7 09/04/2017 01:46 AM      Transthoracic Echo Report 1/23/17  No prior study is available for comparison.   Mitral annular calcification. Heavily calcified anterior " "mitral   leaflet. No mitral stenosis. Moderate eccentric mitral regurgitation.  Structurally normal aortic valve. Aortic sclerosis without stenosis.   Calcified aortic valve leaflets. No aortic insufficiency.  Normal pericardium without effusion.    Transthoracic Echo Report 9/1/17  Left ventricular ejection fraction is visually estimated to be 35%.    Wall motion abnormalities as described.  There is eccentric, posteriorly directed mitral regurgitation which is   moderate to severe.  Moderate tricuspid regurgitation.  Right ventricular systolic pressure is estimated to be 60 mmHg.      Heart cath 9/1/17  IMPRESSION:  1.  Successful stenting of subtotal stenosis of the proximal left anterior descending with a 3.5x18 mm Xience Alpine stent.  2.  A 40% lesion of the proximal right coronary artery.  3.  A 40% lesion in the mid obtuse marginal artery.  4.  Stenosis at the origin of a \"jailed\" diagonal artery.  This lesion could not be angioplastied as it would not pass through the side of the stent.  5.  Elevated LVEDP.  6.  LV dysfunction with EF in the 45% range.    Labs in Media Reviewed with patient from 2/8/18    Assessment:     1. ACC/AHA stage C systolic heart failure (CMS-HCC)  BASIC METABOLIC PANEL   2. Left ventricular systolic dysfunction, NYHA class 3  BASIC METABOLIC PANEL   3. Ischemic cardiomyopathy     4. Stented coronary artery     5. Mixed hyperlipidemia     6. Alcohol use     7. Tobacco abuse       Medical Decision Making:  Today's Assessment / Status / Plan:   1. HFrEF, stage C, class 2-3: Based on physical examination findings, patient is euvolemic. No JVD, lungs are clear to auscultation, no pitting edema in bilateral lower extremities, no ascites.  -Increase carvedilol to 6.25 mg twice a day  -Restart lisinopril 5 mg daily  -BMP in 1 week ( will monitor kidney function, Crt 1.39 on 2/8/18 labs in Media tab)  -Monitor blood pressure and heart rate if problems or concerns or symptoms please contact " our office or go to the emergency room  -Will add Aldactone once ACE-I and BB are optimized  -Reinforced s/sx of worsening heart failure with patient and weight monitoring. Pt verbalizes understanding. Pt to call office or RTC if present.     2. CAD, s/p Stent to LAD/ HLD:   -Continue aspirin 81 mg daily  -Continue atorvastatin 80 mg nightly  -Continue Effient 10 mg daily    3. Alcohol and tobacco abuse  -Encourage patient to stop drinking and to stop smoking.  -Discussed the effects of alcohol and smoking on his heart    FU in clinic in 2 weeks with labs. Sooner if needed.    Patient verbalizes understanding and agrees with the plan of care.     Collaborating MD: VLADISLAV Killian MD

## 2018-02-23 DIAGNOSIS — I50.20 ACC/AHA STAGE C SYSTOLIC HEART FAILURE (HCC): ICD-10-CM

## 2018-02-23 DIAGNOSIS — I51.89 LEFT VENTRICULAR SYSTOLIC DYSFUNCTION, NYHA CLASS 3: ICD-10-CM

## 2018-03-01 ENCOUNTER — OFFICE VISIT (OUTPATIENT)
Dept: CARDIOLOGY | Facility: MEDICAL CENTER | Age: 73
End: 2018-03-01
Payer: MEDICARE

## 2018-03-01 VITALS
WEIGHT: 146 LBS | HEART RATE: 84 BPM | SYSTOLIC BLOOD PRESSURE: 100 MMHG | BODY MASS INDEX: 21.62 KG/M2 | DIASTOLIC BLOOD PRESSURE: 62 MMHG | HEIGHT: 69 IN

## 2018-03-01 DIAGNOSIS — Z95.5 STENTED CORONARY ARTERY: ICD-10-CM

## 2018-03-01 DIAGNOSIS — I50.20 ACC/AHA STAGE C SYSTOLIC HEART FAILURE (HCC): ICD-10-CM

## 2018-03-01 DIAGNOSIS — Z72.0 TOBACCO ABUSE: ICD-10-CM

## 2018-03-01 DIAGNOSIS — Z78.9 ALCOHOL USE: ICD-10-CM

## 2018-03-01 DIAGNOSIS — E78.2 MIXED HYPERLIPIDEMIA: ICD-10-CM

## 2018-03-01 DIAGNOSIS — I50.9 HEART FAILURE, NYHA CLASS 2 (HCC): ICD-10-CM

## 2018-03-01 DIAGNOSIS — I25.5 ISCHEMIC CARDIOMYOPATHY: ICD-10-CM

## 2018-03-01 PROCEDURE — 99214 OFFICE O/P EST MOD 30 MIN: CPT | Performed by: NURSE PRACTITIONER

## 2018-03-01 RX ORDER — FLUTICASONE PROPIONATE 50 MCG
1 SPRAY, SUSPENSION (ML) NASAL DAILY
COMMUNITY
Start: 2017-09-19 | End: 2018-03-01

## 2018-03-01 RX ORDER — TAMSULOSIN HYDROCHLORIDE 0.4 MG/1
0.8 CAPSULE ORAL DAILY
COMMUNITY
End: 2018-03-01

## 2018-03-01 RX ORDER — SPIRONOLACTONE 25 MG/1
25 TABLET ORAL DAILY
Qty: 30 TAB | Refills: 3 | Status: SHIPPED | OUTPATIENT
Start: 2018-03-01 | End: 2018-04-30 | Stop reason: SDUPTHER

## 2018-03-01 RX ORDER — TIOTROPIUM BROMIDE 18 UG/1
18 CAPSULE ORAL; RESPIRATORY (INHALATION) DAILY
COMMUNITY
Start: 2017-05-02 | End: 2018-03-01

## 2018-03-01 RX ORDER — BUPROPION HYDROCHLORIDE 150 MG/1
150 TABLET ORAL DAILY
COMMUNITY
Start: 2017-10-12 | End: 2019-06-26

## 2018-03-01 RX ORDER — LANOLIN ALCOHOL/MO/W.PET/CERES
100 CREAM (GRAM) TOPICAL
COMMUNITY
Start: 2017-09-05 | End: 2018-03-01

## 2018-03-01 ASSESSMENT — ENCOUNTER SYMPTOMS
ORTHOPNEA: 0
PND: 0
CLAUDICATION: 0
COUGH: 0
DIZZINESS: 0
SHORTNESS OF BREATH: 1
ABDOMINAL PAIN: 0
MYALGIAS: 0
FEVER: 0
PALPITATIONS: 0

## 2018-03-01 NOTE — LETTER
[facility]   [address] [place] [address] [address]  [place] [address]  Phone: [phone]  Fax: [phone]              Khai Munoz  1945    Encounter Date: 3/1/2018    EULALIA Castillo          PROGRESS NOTE:  Subjective:   Khai Munoz is a 72 y.o. male who presents today for follow-up on his heart failure with his Wife, Ynes.    Patient of Dr. Davila. Patient was last seen in clinic on 2/14/18. During that visit, his medications were adjusted. Carvedilol was increased to 6.25 mg twice a day and restarted on Lisinopril 5 mg daily. Patient reports no problems with the medication changes.    Patient continues to drink about 2 shots every few days and he is cutting back on his smoking smoke and is down to 4 cigarettes per day.    For symptoms, patient continues to feeling winded with exertion and fatigue. Patient denies chest pain, shortness of breath at rest, ADLs, palpitations, dizziness/lightheadedness, orthopnea, PND or Edema.     Patient has been weighing most days and stays around 150 lbs.     Patient has ischemic cardiomyopathy with an EF of 35% with a Stent to the LAD on 9/1/17.    Pt also has some CKD  Past Medical History:   Diagnosis Date   • Arrhythmia     afib   • Asthma     as a child   • Breath shortness    • Cancer (CMS-HCC) 2006    prostate   • Chronic cough    • Dental disorder     dental implants, bottom dentures implanted, upper, click on   • Emphysema of lung (CMS-HCC)    • High cholesterol    • Psychiatric problem     anxiety and depression   • 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CMS-HCC)    • Tinnitus    • Urinary bladder disorder     history of UTI     Past Surgical History:   Procedure Laterality Date   • ANGIOPLASTY  09/2017   • AAA WITH STENT GRAFT  2/15/2017    Procedure: AAA WITH STENT GRAFT FOR: ENDOVASCULAR REPAIR OF INFRARENAL ABDOMINAL AORTIC ANEURYSM USING TWO DOCKING LIMBS;  Surgeon: Calvin Paulino M.D.;  Location: SURGERY [facility]  [facility];  Service:    • CYST EXCISION      scrotal   • OTHER      brachy therapy and radiation for prostate ca   • OTHER      dental implants     Family History   Problem Relation Age of Onset   • Heart Disease Mother    • GI Sister    • Diabetes Sister      History   Smoking Status   • Current Every Day Smoker   • Packs/day: 0.25   • Years: 50.00   • Types: Cigarettes   Smokeless Tobacco   • Never Used     Allergies   Allergen Reactions   • Declomycin      Reaction a long time ago     Outpatient Encounter Prescriptions as of 3/1/2018   Medication Sig Dispense Refill   • fluticasone (FLONASE) 50 MCG/ACT nasal spray PLACE 1 SPRAY INTO THE NOSTRIL(S) 2 (TWO) TIMES DAILY  2   • carvedilol (COREG) 6.25 MG Tab Take 1 Tab by mouth 2 times a day, with meals. 180 Tab 3   • prasugrel (EFFIENT) 10 MG Tab Take 1 Tab by mouth every day. 90 Tab 3   • lisinopril (PRINIVIL) 5 MG Tab Take 1 Tab by mouth every day. 90 Tab 3   • atorvastatin (LIPITOR) 80 MG tablet Take 1 Tab by mouth every day. 90 Tab 3   • thiamine (THIAMINE) 100 MG tablet Take 1 Tab by mouth every day. 90 Tab 3   • tamsulosin (FLOMAX) 0.4 MG capsule Take 0.8 mg by mouth every bedtime.     • SPIRIVA HANDIHALER 18 MCG Cap Inhale 1 Cap by mouth every day.  3   • ADVAIR DISKUS 250-50 MCG/DOSE AEROSOL POWDER, BREATH ACTIVATED Take 1 Puff by mouth 2 Times a Day.  0   • aspirin 81 MG tablet Take 81 mg by mouth every day.     • pyridoxine 100 MG tablet Take 100 mg by mouth every day. Vitamin B-6       No facility-administered encounter medications on file as of 3/1/2018.      Review of Systems   Constitutional: Positive for malaise/fatigue. Negative for fever.   Respiratory: Positive for shortness of breath. Negative for cough.    Cardiovascular: Negative for chest pain, palpitations, orthopnea, claudication, leg swelling and PND.   Gastrointestinal: Negative for abdominal pain.   Musculoskeletal: Positive for joint pain (hip pain). Negative for myalgias.   Neurological:  "Negative for dizziness.   All other systems reviewed and are negative.       Objective:   /62   Pulse 84   Ht 1.753 m (5' 9\")   Wt 66.2 kg (146 lb)   BMI 21.56 kg/m²      Physical Exam   Constitutional: He is oriented to person, place, and time. He appears well-developed and well-nourished.   HENT:   Head: Normocephalic and atraumatic.   Eyes: EOM are normal.   Neck: Normal range of motion. Neck supple. No JVD present.   Cardiovascular: Normal rate, regular rhythm, normal heart sounds and intact distal pulses.    No murmur heard.  Pulmonary/Chest: Effort normal and breath sounds normal. No respiratory distress. He has no wheezes. He has no rales.   Abdominal: Soft. Bowel sounds are normal.   Musculoskeletal: Normal range of motion. He exhibits no edema.   Neurological: He is alert and oriented to person, place, and time.   Skin: Skin is warm and dry.   Psychiatric: He has a normal mood and affect.   Nursing note and vitals reviewed.    No results found for: CHOLSTRLTOT, LDL, HDL, TRIGLYCERIDE    Lab Results   Component Value Date/Time    SODIUM 136 09/05/2017 02:26 AM    POTASSIUM 3.7 09/05/2017 02:26 AM    CHLORIDE 107 09/05/2017 02:26 AM    CO2 22 09/05/2017 02:26 AM    GLUCOSE 98 09/05/2017 02:26 AM    BUN 21 09/05/2017 02:26 AM    CREATININE 1.91 (H) 09/05/2017 02:26 AM     Lab Results   Component Value Date/Time    ALKPHOSPHAT 17 (L) 09/04/2017 01:46 AM    ASTSGOT 43 09/04/2017 01:46 AM    ALTSGPT 23 09/04/2017 01:46 AM    TBILIRUBIN 0.7 09/04/2017 01:46 AM      Transthoracic Echo Report 1/23/17  No prior study is available for comparison.   Mitral annular calcification. Heavily calcified anterior mitral   leaflet. No mitral stenosis. Moderate eccentric mitral regurgitation.  Structurally normal aortic valve. Aortic sclerosis without stenosis.   Calcified aortic valve leaflets. No aortic insufficiency.  Normal pericardium without effusion.    Transthoracic Echo Report 9/1/17  Left ventricular ejection " "fraction is visually estimated to be 35%.    Wall motion abnormalities as described.  There is eccentric, posteriorly directed mitral regurgitation which is   moderate to severe.  Moderate tricuspid regurgitation.  Right ventricular systolic pressure is estimated to be 60 mmHg.      Heart cath 9/1/17  IMPRESSION:  1.  Successful stenting of subtotal stenosis of the proximal left anterior descending with a 3.5x18 mm Xience Alpine stent.  2.  A 40% lesion of the proximal right coronary artery.  3.  A 40% lesion in the mid obtuse marginal artery.  4.  Stenosis at the origin of a \"jailed\" diagonal artery.  This lesion could not be angioplastied as it would not pass through the side of the stent.  5.  Elevated LVEDP.  6.  LV dysfunction with EF in the 45% range.    Labs in Media Reviewed with patient from 2/8/18    Assessment:     1. ACC/AHA stage C systolic heart failure (CMS-HCC)  spironolactone (ALDACTONE) 25 MG Tab    BASIC METABOLIC PANEL   2. Heart failure, NYHA class 2 (CMS-HCC)  spironolactone (ALDACTONE) 25 MG Tab    BASIC METABOLIC PANEL   3. Ischemic cardiomyopathy  spironolactone (ALDACTONE) 25 MG Tab    BASIC METABOLIC PANEL   4. Stented coronary artery     5. Mixed hyperlipidemia     6. Alcohol use     7. Tobacco abuse       Medical Decision Making:  Today's Assessment / Status / Plan:   1. HFrEF, stage C, class 2: Based on physical examination findings, patient is euvolemic. No JVD, lungs are clear to auscultation, no pitting edema in bilateral lower extremities, no ascites. BP is Borderline today  -Start Spironolactone 25 mg Daily (pt to monitor vitals and to call office with problems or concerns)  -Continue carvedilol to 6.25 mg twice a day  -Continue lisinopril 5 mg daily  -BMP in 1 week ( will monitor kidney function, Crt 1.5 on 2/22/18 labs in Media tab)  -Monitor blood pressure and heart rate if problems or concerns or symptoms please contact our office or go to the emergency room  -Reinforced s/sx " of worsening heart failure with patient and weight monitoring. Pt verbalizes understanding. Pt to call office or RTC if present.     2. CAD, s/p Stent to LAD/ HLD:   -Continue aspirin 81 mg daily  -Continue atorvastatin 80 mg nightly  -Continue Effient 10 mg daily    3. Alcohol and tobacco abuse  -Encourage patient to stop drinking and to stop smoking.  -Discussed the effects of alcohol and smoking on his heart    FU in clinic in 2 weeks with Dr. Davila. Sooner if needed.    Patient verbalizes understanding and agrees with the plan of care.     Collaborating MD: MD Baldo Ornelas M.D.  [address] [facility] [address]  VIA Facsimile: [phone]

## 2018-03-01 NOTE — PATIENT INSTRUCTIONS
Start Spironolactone 25 mg daily  BMP/ Lab in 1 week  Call office if problems or concerns with new medication

## 2018-03-01 NOTE — PROGRESS NOTES
Subjective:   Khai Munoz is a 72 y.o. male who presents today for follow-up on his heart failure with his Wife, Ynes.    Patient of Dr. Davila. Patient was last seen in clinic on 2/14/18. During that visit, his medications were adjusted. Carvedilol was increased to 6.25 mg twice a day and restarted on Lisinopril 5 mg daily. Patient reports no problems with the medication changes.    Patient continues to drink about 2 shots every few days and he is cutting back on his smoking smoke and is down to 4 cigarettes per day.    For symptoms, patient continues to feeling winded with exertion and fatigue. Patient denies chest pain, shortness of breath at rest, ADLs, palpitations, dizziness/lightheadedness, orthopnea, PND or Edema.     Patient has been weighing most days and stays around 150 lbs.     Patient has ischemic cardiomyopathy with an EF of 35% with a Stent to the LAD on 9/1/17.    Pt also has some CKD  Past Medical History:   Diagnosis Date   • Arrhythmia     afib   • Asthma     as a child   • Breath shortness    • Cancer (CMS-HCC) 2006    prostate   • Chronic cough    • Dental disorder     dental implants, bottom dentures implanted, upper, click on   • Emphysema of lung (CMS-HCC)    • High cholesterol    • Psychiatric problem     anxiety and depression   • 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CMS-HCC)    • Tinnitus    • Urinary bladder disorder     history of UTI     Past Surgical History:   Procedure Laterality Date   • ANGIOPLASTY  09/2017   • AAA WITH STENT GRAFT  2/15/2017    Procedure: AAA WITH STENT GRAFT FOR: ENDOVASCULAR REPAIR OF INFRARENAL ABDOMINAL AORTIC ANEURYSM USING TWO DOCKING LIMBS;  Surgeon: Calvin Paulino M.D.;  Location: SURGERY [facility];  Service:    • CYST EXCISION      scrotal   • OTHER      brachy therapy and radiation for prostate ca   • OTHER      dental implants     Family History   Problem Relation Age of Onset   • Heart Disease Mother    • GI Sister    • Diabetes Sister      History  "  Smoking Status   • Current Every Day Smoker   • Packs/day: 0.25   • Years: 50.00   • Types: Cigarettes   Smokeless Tobacco   • Never Used     Allergies   Allergen Reactions   • Declomycin      Reaction a long time ago     Outpatient Encounter Prescriptions as of 3/1/2018   Medication Sig Dispense Refill   • fluticasone (FLONASE) 50 MCG/ACT nasal spray PLACE 1 SPRAY INTO THE NOSTRIL(S) 2 (TWO) TIMES DAILY  2   • carvedilol (COREG) 6.25 MG Tab Take 1 Tab by mouth 2 times a day, with meals. 180 Tab 3   • prasugrel (EFFIENT) 10 MG Tab Take 1 Tab by mouth every day. 90 Tab 3   • lisinopril (PRINIVIL) 5 MG Tab Take 1 Tab by mouth every day. 90 Tab 3   • atorvastatin (LIPITOR) 80 MG tablet Take 1 Tab by mouth every day. 90 Tab 3   • thiamine (THIAMINE) 100 MG tablet Take 1 Tab by mouth every day. 90 Tab 3   • tamsulosin (FLOMAX) 0.4 MG capsule Take 0.8 mg by mouth every bedtime.     • SPIRIVA HANDIHALER 18 MCG Cap Inhale 1 Cap by mouth every day.  3   • ADVAIR DISKUS 250-50 MCG/DOSE AEROSOL POWDER, BREATH ACTIVATED Take 1 Puff by mouth 2 Times a Day.  0   • aspirin 81 MG tablet Take 81 mg by mouth every day.     • pyridoxine 100 MG tablet Take 100 mg by mouth every day. Vitamin B-6       No facility-administered encounter medications on file as of 3/1/2018.      Review of Systems   Constitutional: Positive for malaise/fatigue. Negative for fever.   Respiratory: Positive for shortness of breath. Negative for cough.    Cardiovascular: Negative for chest pain, palpitations, orthopnea, claudication, leg swelling and PND.   Gastrointestinal: Negative for abdominal pain.   Musculoskeletal: Positive for joint pain (hip pain). Negative for myalgias.   Neurological: Negative for dizziness.   All other systems reviewed and are negative.       Objective:   /62   Pulse 84   Ht 1.753 m (5' 9\")   Wt 66.2 kg (146 lb)   BMI 21.56 kg/m²     Physical Exam   Constitutional: He is oriented to person, place, and time. He appears " well-developed and well-nourished.   HENT:   Head: Normocephalic and atraumatic.   Eyes: EOM are normal.   Neck: Normal range of motion. Neck supple. No JVD present.   Cardiovascular: Normal rate, regular rhythm, normal heart sounds and intact distal pulses.    No murmur heard.  Pulmonary/Chest: Effort normal and breath sounds normal. No respiratory distress. He has no wheezes. He has no rales.   Abdominal: Soft. Bowel sounds are normal.   Musculoskeletal: Normal range of motion. He exhibits no edema.   Neurological: He is alert and oriented to person, place, and time.   Skin: Skin is warm and dry.   Psychiatric: He has a normal mood and affect.   Nursing note and vitals reviewed.    No results found for: CHOLSTRLTOT, LDL, HDL, TRIGLYCERIDE    Lab Results   Component Value Date/Time    SODIUM 136 09/05/2017 02:26 AM    POTASSIUM 3.7 09/05/2017 02:26 AM    CHLORIDE 107 09/05/2017 02:26 AM    CO2 22 09/05/2017 02:26 AM    GLUCOSE 98 09/05/2017 02:26 AM    BUN 21 09/05/2017 02:26 AM    CREATININE 1.91 (H) 09/05/2017 02:26 AM     Lab Results   Component Value Date/Time    ALKPHOSPHAT 17 (L) 09/04/2017 01:46 AM    ASTSGOT 43 09/04/2017 01:46 AM    ALTSGPT 23 09/04/2017 01:46 AM    TBILIRUBIN 0.7 09/04/2017 01:46 AM      Transthoracic Echo Report 1/23/17  No prior study is available for comparison.   Mitral annular calcification. Heavily calcified anterior mitral   leaflet. No mitral stenosis. Moderate eccentric mitral regurgitation.  Structurally normal aortic valve. Aortic sclerosis without stenosis.   Calcified aortic valve leaflets. No aortic insufficiency.  Normal pericardium without effusion.    Transthoracic Echo Report 9/1/17  Left ventricular ejection fraction is visually estimated to be 35%.    Wall motion abnormalities as described.  There is eccentric, posteriorly directed mitral regurgitation which is   moderate to severe.  Moderate tricuspid regurgitation.  Right ventricular systolic pressure is estimated to  "be 60 mmHg.      Heart cath 9/1/17  IMPRESSION:  1.  Successful stenting of subtotal stenosis of the proximal left anterior descending with a 3.5x18 mm Xience Alpine stent.  2.  A 40% lesion of the proximal right coronary artery.  3.  A 40% lesion in the mid obtuse marginal artery.  4.  Stenosis at the origin of a \"jailed\" diagonal artery.  This lesion could not be angioplastied as it would not pass through the side of the stent.  5.  Elevated LVEDP.  6.  LV dysfunction with EF in the 45% range.    Labs in Media Reviewed with patient from 2/8/18    Assessment:     1. ACC/AHA stage C systolic heart failure (CMS-HCC)  spironolactone (ALDACTONE) 25 MG Tab    BASIC METABOLIC PANEL   2. Heart failure, NYHA class 2 (CMS-HCC)  spironolactone (ALDACTONE) 25 MG Tab    BASIC METABOLIC PANEL   3. Ischemic cardiomyopathy  spironolactone (ALDACTONE) 25 MG Tab    BASIC METABOLIC PANEL   4. Stented coronary artery     5. Mixed hyperlipidemia     6. Alcohol use     7. Tobacco abuse       Medical Decision Making:  Today's Assessment / Status / Plan:   1. HFrEF, stage C, class 2: Based on physical examination findings, patient is euvolemic. No JVD, lungs are clear to auscultation, no pitting edema in bilateral lower extremities, no ascites. BP is Borderline today  -Start Spironolactone 25 mg Daily (pt to monitor vitals and to call office with problems or concerns)  -Continue carvedilol to 6.25 mg twice a day  -Continue lisinopril 5 mg daily  -BMP in 1 week ( will monitor kidney function, Crt 1.5 on 2/22/18 labs in Media tab)  -Monitor blood pressure and heart rate if problems or concerns or symptoms please contact our office or go to the emergency room  -Reinforced s/sx of worsening heart failure with patient and weight monitoring. Pt verbalizes understanding. Pt to call office or RTC if present.     2. CAD, s/p Stent to LAD/ HLD:   -Continue aspirin 81 mg daily  -Continue atorvastatin 80 mg nightly  -Continue Effient 10 mg " daily    3. Alcohol and tobacco abuse  -Encourage patient to stop drinking and to stop smoking.  -Discussed the effects of alcohol and smoking on his heart    FU in clinic in 2 weeks with Dr. Davila. Sooner if needed.    Patient verbalizes understanding and agrees with the plan of care.     Collaborating MD: Antonio Davila MD

## 2018-03-12 DIAGNOSIS — I25.5 ISCHEMIC CARDIOMYOPATHY: ICD-10-CM

## 2018-03-12 DIAGNOSIS — I50.9 HEART FAILURE, NYHA CLASS 2 (HCC): ICD-10-CM

## 2018-03-12 DIAGNOSIS — I50.20 ACC/AHA STAGE C SYSTOLIC HEART FAILURE (HCC): ICD-10-CM

## 2018-03-15 ENCOUNTER — OFFICE VISIT (OUTPATIENT)
Dept: CARDIOLOGY | Facility: MEDICAL CENTER | Age: 73
End: 2018-03-15
Payer: MEDICARE

## 2018-03-15 VITALS
HEART RATE: 92 BPM | BODY MASS INDEX: 21.89 KG/M2 | WEIGHT: 147.8 LBS | SYSTOLIC BLOOD PRESSURE: 116 MMHG | DIASTOLIC BLOOD PRESSURE: 56 MMHG | OXYGEN SATURATION: 98 % | HEIGHT: 69 IN

## 2018-03-15 DIAGNOSIS — Z95.5 STENTED CORONARY ARTERY: ICD-10-CM

## 2018-03-15 DIAGNOSIS — Z72.0 TOBACCO ABUSE: ICD-10-CM

## 2018-03-15 DIAGNOSIS — I71.40 ABDOMINAL ANEURYSM (HCC): ICD-10-CM

## 2018-03-15 DIAGNOSIS — I50.9 HEART FAILURE, NYHA CLASS 2 (HCC): ICD-10-CM

## 2018-03-15 DIAGNOSIS — E78.2 MIXED HYPERLIPIDEMIA: ICD-10-CM

## 2018-03-15 DIAGNOSIS — I49.1 PREMATURE ATRIAL COMPLEXES: ICD-10-CM

## 2018-03-15 DIAGNOSIS — I50.20 ACC/AHA STAGE C SYSTOLIC HEART FAILURE (HCC): ICD-10-CM

## 2018-03-15 DIAGNOSIS — Z78.9 ALCOHOL USE: ICD-10-CM

## 2018-03-15 DIAGNOSIS — Z79.899 HIGH RISK MEDICATION USE: ICD-10-CM

## 2018-03-15 DIAGNOSIS — I25.5 ISCHEMIC CARDIOMYOPATHY: ICD-10-CM

## 2018-03-15 PROCEDURE — 99214 OFFICE O/P EST MOD 30 MIN: CPT | Mod: 25 | Performed by: INTERNAL MEDICINE

## 2018-03-15 PROCEDURE — 99406 BEHAV CHNG SMOKING 3-10 MIN: CPT | Performed by: INTERNAL MEDICINE

## 2018-03-15 ASSESSMENT — ENCOUNTER SYMPTOMS
DEPRESSION: 0
COUGH: 0
BLURRED VISION: 0
ABDOMINAL PAIN: 0
FALLS: 0
MYALGIAS: 0
DIAPHORESIS: 0
FEVER: 0
DOUBLE VISION: 0
DIZZINESS: 0
MEMORY LOSS: 0
SHORTNESS OF BREATH: 1
SENSORY CHANGE: 0
PALPITATIONS: 0
BRUISES/BLEEDS EASILY: 0
HEADACHES: 0

## 2018-03-15 NOTE — LETTER
[facility]   [address] [facility] [address] [address]  [place] [address]  Phone: [phone]  Fax: [phone]              Khai Munoz  1945    Encounter Date: 3/15/2018    James Davila M.D.          PROGRESS NOTE:  Subjective:   Khai Munoz is a 71 y.o. male who presents today For cardiac care after his recent hospitalization due to non-ST elevation myocardial infarction episode. Patient underwent PCI and stenting of his proximal LAD. He also has a history of peripheral vascular disease for which he underwent AAA repair in February 2017. Patient is doing okay right now. No chest pain. He does get winded with daily living activities. His left ventricular systolic function dropped to 35% after his myocardial infarction episode. He is currently still drinking and smoking.     Patient still gets winded with daily living activities and exertion. No symptoms at rest.    Chief Complaint: dyspnea.    Past Medical History:   Diagnosis Date   • Arrhythmia     afib   • Asthma     as a child   • Breath shortness    • Cancer (CMS-[facility]) 2006    prostate   • Chronic cough    • Dental disorder     dental implants, bottom dentures implanted, upper, click on   • Emphysema of lung (CMS-[facility])    • High cholesterol    • Psychiatric problem     anxiety and depression   • 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CMS-[facility])    • Tinnitus    • Urinary bladder disorder     history of UTI     Past Surgical History:   Procedure Laterality Date   • ANGIOPLASTY  09/2017   • AAA WITH STENT GRAFT  2/15/2017    Procedure: AAA WITH STENT GRAFT FOR: ENDOVASCULAR REPAIR OF INFRARENAL ABDOMINAL AORTIC ANEURYSM USING TWO DOCKING LIMBS;  Surgeon: Calvin Paulino M.D.;  Location: SURGERY [facility];  Service:    • CYST EXCISION      scrotal   • OTHER      brachy therapy and radiation for prostate ca   • OTHER      dental implants     Family History   Problem Relation Age of Onset   • Heart Disease Mother    • GI  Sister    • Diabetes Sister      History   Smoking Status   • Current Every Day Smoker   • Packs/day: 0.25   • Years: 50.00   • Types: Cigarettes   Smokeless Tobacco   • Never Used     Allergies   Allergen Reactions   • Declomycin      Reaction a long time ago     Outpatient Encounter Prescriptions as of 3/15/2018   Medication Sig Dispense Refill   • ipratropium (ATROVENT HFA) 17 MCG/ACT Aero Soln Inhale 2 Puffs by mouth every 6 hours.     • buPROPion (WELLBUTRIN XL) 150 MG XL tablet Take 150 mg by mouth every day.     • spironolactone (ALDACTONE) 25 MG Tab Take 1 Tab by mouth every day. 30 Tab 3   • fluticasone (FLONASE) 50 MCG/ACT nasal spray PLACE 1 SPRAY INTO THE NOSTRIL(S) 2 (TWO) TIMES DAILY  2   • carvedilol (COREG) 6.25 MG Tab Take 1 Tab by mouth 2 times a day, with meals. 180 Tab 3   • prasugrel (EFFIENT) 10 MG Tab Take 1 Tab by mouth every day. 90 Tab 3   • lisinopril (PRINIVIL) 5 MG Tab Take 1 Tab by mouth every day. 90 Tab 3   • atorvastatin (LIPITOR) 80 MG tablet Take 1 Tab by mouth every day. 90 Tab 3   • thiamine (THIAMINE) 100 MG tablet Take 1 Tab by mouth every day. 90 Tab 3   • tamsulosin (FLOMAX) 0.4 MG capsule Take 0.8 mg by mouth every bedtime.     • ADVAIR DISKUS 250-50 MCG/DOSE AEROSOL POWDER, BREATH ACTIVATED Take 1 Puff by mouth 2 Times a Day.  0   • pyridoxine 100 MG tablet Take 100 mg by mouth every day. Vitamin B-6     • aspirin 81 MG tablet Take 81 mg by mouth every day.     • SPIRIVA HANDIHALER 18 MCG Cap Inhale 1 Cap by mouth every day.  3     No facility-administered encounter medications on file as of 3/15/2018.      Review of Systems   Constitutional: Negative for diaphoresis and fever.   HENT: Negative for nosebleeds.    Eyes: Negative for blurred vision and double vision.   Respiratory: Positive for shortness of breath. Negative for cough.    Cardiovascular: Negative for chest pain and palpitations.   Gastrointestinal: Negative for abdominal pain.   Genitourinary: Negative for  "dysuria and frequency.   Musculoskeletal: Negative for falls and myalgias.   Skin: Negative for rash.   Neurological: Negative for dizziness, sensory change and headaches.   Endo/Heme/Allergies: Does not bruise/bleed easily.   Psychiatric/Behavioral: Negative for depression and memory loss.        Objective:   /56   Pulse 92   Ht 1.753 m (5' 9\")   Wt 67 kg (147 lb 12.8 oz)   SpO2 98%   BMI 21.83 kg/m²      Physical Exam   Constitutional: He is oriented to person, place, and time. He appears well-developed and well-nourished.   HENT:   Head: Normocephalic and atraumatic.   Eyes: EOM are normal.   Neck: Normal range of motion. No JVD present.   Cardiovascular: Normal rate, regular rhythm, normal heart sounds and intact distal pulses.  Exam reveals no gallop and no friction rub.    No murmur heard.  Bilateral femoral pulses are 2+, bilateral dorsalis pedis pulses are 2+, bilateral posterior tibialis pulses are 2+.   Pulmonary/Chest: No respiratory distress. He has no wheezes. He has no rales. He exhibits no tenderness.   Abdominal: Soft. Bowel sounds are normal. There is no tenderness. There is no rebound and no guarding.   The is no presence of abdominal bruits   Musculoskeletal: Normal range of motion.   Neurological: He is alert and oriented to person, place, and time.   Skin: Skin is warm and dry.   Psychiatric: He has a normal mood and affect.   Nursing note and vitals reviewed.      Assessment:     1. ACC/AHA stage C systolic heart failure (CMS-HCC)     2. Heart failure, NYHA class 2 (CMS-HCC)     3. Ischemic cardiomyopathy     4. Stented coronary artery     5. Mixed hyperlipidemia     6. Alcohol use     7. Tobacco abuse     8. Premature atrial complexes     9. Abdominal aneurysm (CMS-HCC)     10. High risk medication use         Medical Decision Making:  Today's Assessment / Status / Plan:   Today, based on physical examination findings, patient is euvolemic. No JVD, lungs are clear to auscultation, " no pitting edema in bilateral lower extremities, no ascites.    Dry weight is 147 lbs.    Cont current medications at current dose.     Coreg to 6.25 mg po bid.  Continue Lisinopril 5 mg po daily.  Continue ASA, Plavix, Atorvastatin.    Creatinine is elevated therefore, use of Spironolactone might be restricted.     Advise on stopping ETOH use, tobacco use.  I spent 5 minutes advising patient on the importance of smoking cessation.     Not sure he is a good candidate for ICD due to ongoing drinking problem.     I will see patient back in our Heart Failure Clinic with lab tests and studies results in 12 weeks per patient's request due to winter weather. Will check TTE at that time after ETOH cessation.     I thank you Dr. Fajardo for referring patient to our Heart Failure Clinic today.          Baldo Fajardo M.D.  [address] [facility] [address]  VIA Facsimile: [phone]

## 2018-04-30 DIAGNOSIS — I50.9 HEART FAILURE, NYHA CLASS 2 (HCC): ICD-10-CM

## 2018-04-30 DIAGNOSIS — I25.5 ISCHEMIC CARDIOMYOPATHY: ICD-10-CM

## 2018-04-30 DIAGNOSIS — I50.20 ACC/AHA STAGE C SYSTOLIC HEART FAILURE (HCC): ICD-10-CM

## 2018-05-04 RX ORDER — SPIRONOLACTONE 25 MG/1
25 TABLET ORAL DAILY
Qty: 30 TAB | Refills: 3 | Status: SHIPPED | OUTPATIENT
Start: 2018-05-04 | End: 2018-09-26 | Stop reason: SDUPTHER

## 2018-07-06 ENCOUNTER — OFFICE VISIT (OUTPATIENT)
Dept: CARDIOLOGY | Facility: MEDICAL CENTER | Age: 73
End: 2018-07-06
Payer: MEDICARE

## 2018-07-06 VITALS
HEART RATE: 88 BPM | HEIGHT: 69 IN | BODY MASS INDEX: 20.88 KG/M2 | WEIGHT: 141 LBS | SYSTOLIC BLOOD PRESSURE: 110 MMHG | DIASTOLIC BLOOD PRESSURE: 52 MMHG | OXYGEN SATURATION: 96 %

## 2018-07-06 DIAGNOSIS — Z72.0 TOBACCO ABUSE: ICD-10-CM

## 2018-07-06 DIAGNOSIS — I25.5 ISCHEMIC CARDIOMYOPATHY: ICD-10-CM

## 2018-07-06 DIAGNOSIS — I49.1 PREMATURE ATRIAL COMPLEXES: ICD-10-CM

## 2018-07-06 DIAGNOSIS — I71.40 ABDOMINAL ANEURYSM (HCC): ICD-10-CM

## 2018-07-06 DIAGNOSIS — Z79.899 HIGH RISK MEDICATION USE: ICD-10-CM

## 2018-07-06 DIAGNOSIS — I50.9 HEART FAILURE, NYHA CLASS 2 (HCC): ICD-10-CM

## 2018-07-06 DIAGNOSIS — Z78.9 ALCOHOL USE: ICD-10-CM

## 2018-07-06 DIAGNOSIS — E78.2 MIXED HYPERLIPIDEMIA: ICD-10-CM

## 2018-07-06 DIAGNOSIS — I50.20 ACC/AHA STAGE C SYSTOLIC HEART FAILURE (HCC): ICD-10-CM

## 2018-07-06 DIAGNOSIS — Z95.5 STENTED CORONARY ARTERY: ICD-10-CM

## 2018-07-06 PROCEDURE — 99214 OFFICE O/P EST MOD 30 MIN: CPT | Performed by: INTERNAL MEDICINE

## 2018-07-06 RX ORDER — CARVEDILOL 3.12 MG/1
6.25 TABLET ORAL 2 TIMES DAILY
COMMUNITY
Start: 2018-07-03 | End: 2018-09-24

## 2018-07-06 ASSESSMENT — ENCOUNTER SYMPTOMS
CLAUDICATION: 0
PALPITATIONS: 0
BLURRED VISION: 0
DIZZINESS: 0
HALLUCINATIONS: 0
LOSS OF CONSCIOUSNESS: 0
ABDOMINAL PAIN: 0
SPEECH CHANGE: 0
BRUISES/BLEEDS EASILY: 0
MEMORY LOSS: 1
DEPRESSION: 0
SENSORY CHANGE: 0
MYALGIAS: 0
ORTHOPNEA: 0
EYE DISCHARGE: 0
COUGH: 0
BLOOD IN STOOL: 0
NAUSEA: 0
FALLS: 0
VOMITING: 0
EYE PAIN: 0
DOUBLE VISION: 0
WEIGHT LOSS: 0
FEVER: 0
HEADACHES: 0
CHILLS: 0
SHORTNESS OF BREATH: 1
PND: 0

## 2018-07-06 NOTE — PROGRESS NOTES
Chief Complaint   Patient presents with   • MI (Non ST Segment Elevation MI)       Subjective:   Khai Munoz is a 72 y.o. male who presents today For cardiac care after his recent hospitalization due to non-ST elevation myocardial infarction episode. Patient underwent PCI and stenting of his proximal LAD. He also has a history of peripheral vascular disease for which he underwent AAA repair in February 2017. Patient is doing okay right now. No chest pain. He does get winded with daily living activities. His left ventricular systolic function dropped to 35% after his myocardial infarction episode. He is currently still drinking and smoking.     Patient still gets winded with daily living activities and exertion. No symptoms at rest.    No clinical change since last visit. On minimal meds.    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[facility])    • Tinnitus    • Urinary bladder disorder     history of UTI     Past Surgical History:   Procedure Laterality Date   • ANGIOPLASTY  09/2017   • AAA WITH STENT GRAFT  2/15/2017    Procedure: AAA WITH STENT GRAFT FOR: ENDOVASCULAR REPAIR OF INFRARENAL ABDOMINAL AORTIC ANEURYSM USING TWO DOCKING LIMBS;  Surgeon: Calvin Paulino M.D.;  Location: SURGERY [facility];  Service:    • CYST EXCISION      scrotal   • OTHER      brachy therapy and radiation for prostate ca   • OTHER      dental implants     Family History   Problem Relation Age of Onset   • Heart Disease Mother    • GI Sister    • Diabetes Sister      Social History     Social History   • Marital status:      Spouse name: N/A   • Number of children: N/A   • Years of education: N/A      Occupational History   • Not on file.     Social History Main Topics   • Smoking status: Current Every Day Smoker     Packs/day: 0.25     Years: 50.00     Types: Cigarettes   • Smokeless tobacco: Never Used   • Alcohol use 3.6 - 4.8 oz/week     6 - 8 Shots of liquor per week      Comment: 375ml per day fo [place]   • Drug use: Yes      Comment: daily marijuana   • Sexual activity: Not on file     Other Topics Concern   • Not on file     Social History Narrative   • No narrative on file     Allergies   Allergen Reactions   • Declomycin      Reaction a long time ago     Outpatient Encounter Prescriptions as of 7/6/2018   Medication Sig Dispense Refill   • carvedilol (COREG) 3.125 MG Tab      • spironolactone (ALDACTONE) 25 MG Tab Take 1 Tab by mouth every day. 30 Tab 3   • ipratropium (ATROVENT HFA) 17 MCG/ACT Aero Soln Inhale 2 Puffs by mouth every 6 hours.     • buPROPion (WELLBUTRIN XL) 150 MG XL tablet Take 150 mg by mouth every day.     • prasugrel (EFFIENT) 10 MG Tab Take 1 Tab by mouth every day. 90 Tab 3   • lisinopril (PRINIVIL) 5 MG Tab Take 1 Tab by mouth every day. 90 Tab 3   • atorvastatin (LIPITOR) 80 MG tablet Take 1 Tab by mouth every day. 90 Tab 3   • thiamine (THIAMINE) 100 MG tablet Take 1 Tab by mouth every day. 90 Tab 3   • tamsulosin (FLOMAX) 0.4 MG capsule Take 0.8 mg by mouth every bedtime.     • ADVAIR DISKUS 250-50 MCG/DOSE AEROSOL POWDER, BREATH ACTIVATED Take 1 Puff by mouth 2 Times a Day.  0   • pyridoxine 100 MG tablet Take 100 mg by mouth every day. Vitamin B-6     • aspirin 81 MG tablet Take 81 mg by mouth every day.     • fluticasone (FLONASE) 50 MCG/ACT nasal spray PLACE 1 SPRAY INTO THE NOSTRIL(S) 2 (TWO) TIMES DAILY  2   • carvedilol (COREG) 6.25 MG Tab Take 1 Tab by mouth 2 times a day, with meals. 180 Tab 3   • SPIRIVA HANDIHALER 18 MCG Cap Inhale 1 Cap by mouth every day.  3     No facility-administered encounter medications on file as of 7/6/2018.      Review of  "Systems   Constitutional: Negative for chills, fever, malaise/fatigue and weight loss.   HENT: Negative for ear discharge, ear pain, hearing loss and nosebleeds.    Eyes: Negative for blurred vision, double vision, pain and discharge.   Respiratory: Positive for shortness of breath. Negative for cough.    Cardiovascular: Negative for chest pain, palpitations, orthopnea, claudication, leg swelling and PND.   Gastrointestinal: Negative for abdominal pain, blood in stool, melena, nausea and vomiting.   Genitourinary: Negative for dysuria and hematuria.   Musculoskeletal: Negative for falls, joint pain and myalgias.   Skin: Negative for itching and rash.   Neurological: Negative for dizziness, sensory change, speech change, loss of consciousness and headaches.   Endo/Heme/Allergies: Negative for environmental allergies. Does not bruise/bleed easily.   Psychiatric/Behavioral: Positive for memory loss. Negative for depression, hallucinations and suicidal ideas.        Objective:   /52   Pulse 88   Ht 1.753 m (5' 9\")   Wt 64 kg (141 lb)   SpO2 96%   BMI 20.82 kg/m²     Physical Exam   Constitutional: He is oriented to person, place, and time. No distress.   HENT:   Head: Normocephalic and atraumatic.   Right Ear: External ear normal.   Left Ear: External ear normal.   Eyes: Right eye exhibits no discharge. Left eye exhibits no discharge.   Neck: No JVD present. No thyromegaly present.   Cardiovascular: Normal rate, regular rhythm, normal heart sounds and intact distal pulses.  Exam reveals no gallop and no friction rub.    No murmur heard.  Pulmonary/Chest: Breath sounds normal. No respiratory distress.   Abdominal: Bowel sounds are normal. He exhibits no distension. There is no tenderness.   Musculoskeletal: He exhibits no edema or tenderness.   Neurological: He is alert and oriented to person, place, and time. No cranial nerve deficit.   Skin: Skin is warm and dry. He is not diaphoretic.   Psychiatric: He has " a normal mood and affect. His behavior is normal.   Nursing note and vitals reviewed.      Assessment:     1. ACC/AHA stage C systolic heart failure (HCC)     2. Heart failure, NYHA class 2 (HCC)     3. Ischemic cardiomyopathy     4. Stented coronary artery     5. Mixed hyperlipidemia     6. Alcohol use     7. Tobacco abuse     8. Premature atrial complexes     9. Abdominal aneurysm (HCC)     10. High risk medication use         Medical Decision Making:  Today's Assessment / Status / Plan:   At this time patient is clinically stable in terms of his cardiac standpoint.  Cont current medications at current dose.     I suspect that there is some baseline dementia in this patient.  Patient repeatedly asked me about the nature of his medication and why he is taking the medications.  He does not remember that I went over all of the details with him in prior visits.    I spent 5 minutes talking to patient about the danger of smoking. I advised patient and counseled patient on smoking cessation. Patient has promised to achieve goal of zero cigarettes per day.

## 2018-07-06 NOTE — LETTER
[facility]   [address], [address]  [place] [address]  Phone: [phone]  Fax: [phone]              Khai Munoz  1945    Encounter Date: 7/6/2018    James Davila M.D.          PROGRESS NOTE:  Chief Complaint   Patient presents with   • MI (Non ST Segment Elevation MI)       Subjective:   Khai Munoz is a 72 y.o. male who presents today For cardiac care after his recent hospitalization due to non-ST elevation myocardial infarction episode. Patient underwent PCI and stenting of his proximal LAD. He also has a history of peripheral vascular disease for which he underwent AAA repair in February 2017. Patient is doing okay right now. No chest pain. He does get winded with daily living activities. His left ventricular systolic function dropped to 35% after his myocardial infarction episode. He is currently still drinking and smoking.     Patient still gets winded with daily living activities and exertion. No symptoms at rest.    No clinical change since last visit. On minimal meds.    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HCC)    • Tinnitus    • Urinary bladder disorder     history of UTI     Past Surgical History:   Procedure Laterality Date   • ANGIOPLASTY  09/2017   • AAA WITH STENT GRAFT  2/15/2017    Procedure: AAA WITH STENT GRAFT FOR: ENDOVASCULAR REPAIR OF INFRARENAL ABDOMINAL AORTIC ANEURYSM USING TWO DOCKING LIMBS;  Surgeon: Calvin Paulino M.D.;  Location: SURGERY [facility];  Service:    • CYST EXCISION      scrotal   • OTHER      brachy therapy and radiation for prostate ca   • OTHER      dental implants     Family History   Problem Relation Age of Onset   • Heart Disease Mother    • GI Sister    • Diabetes Sister      Social History     Social History   • Marital status:      Spouse name: N/A   • Number of children: N/A   • Years of education: N/A     Occupational History   • Not on file.     Social History Main Topics   • Smoking status: Current Every Day Smoker     Packs/day: 0.25     Years: 50.00     Types: Cigarettes   • Smokeless tobacco: Never Used   • Alcohol use 3.6 - 4.8 oz/week     6 - 8 Shots of liquor per week      Comment: 375ml per day fo [place]   • Drug use: Yes      Comment: daily marijuana   • Sexual activity: Not on file     Other Topics Concern   • Not on file     Social History Narrative   • No narrative on file     Allergies   Allergen Reactions   • Declomycin      Reaction a long time ago     Outpatient Encounter Prescriptions as of 7/6/2018   Medication Sig Dispense Refill   • carvedilol (COREG) 3.125 MG Tab      • spironolactone (ALDACTONE) 25 MG Tab Take 1 Tab by mouth every day. 30 Tab 3   • ipratropium (ATROVENT HFA) 17 MCG/ACT Aero Soln Inhale 2 Puffs by mouth every 6 hours.     • buPROPion (WELLBUTRIN XL) 150 MG XL tablet Take 150 mg by mouth every day.     • prasugrel (EFFIENT) 10 MG Tab Take 1 Tab by mouth every day. 90 Tab 3   • lisinopril (PRINIVIL) 5 MG Tab Take 1 Tab by mouth every day. 90 Tab 3   • atorvastatin (LIPITOR) 80 MG tablet Take 1 Tab by mouth every day. 90 Tab 3   • thiamine (THIAMINE) 100 MG tablet Take 1 Tab by mouth every day. 90 Tab 3   • tamsulosin (FLOMAX) 0.4 MG capsule Take 0.8 mg by mouth every bedtime.     • ADVAIR DISKUS 250-50 MCG/DOSE AEROSOL POWDER, BREATH ACTIVATED Take 1 Puff by mouth 2 Times a Day.  0   • pyridoxine 100 MG tablet Take 100 mg by mouth every day. Vitamin B-6     • aspirin 81 MG tablet Take 81 mg by mouth every day.     • fluticasone (FLONASE) 50 MCG/ACT nasal spray PLACE 1 SPRAY INTO THE NOSTRIL(S) 2 (TWO)  "TIMES DAILY  2   • carvedilol (COREG) 6.25 MG Tab Take 1 Tab by mouth 2 times a day, with meals. 180 Tab 3   • SPIRIVA HANDIHALER 18 MCG Cap Inhale 1 Cap by mouth every day.  3     No facility-administered encounter medications on file as of 7/6/2018.      Review of Systems   Constitutional: Negative for chills, fever, malaise/fatigue and weight loss.   HENT: Negative for ear discharge, ear pain, hearing loss and nosebleeds.    Eyes: Negative for blurred vision, double vision, pain and discharge.   Respiratory: Positive for shortness of breath. Negative for cough.    Cardiovascular: Negative for chest pain, palpitations, orthopnea, claudication, leg swelling and PND.   Gastrointestinal: Negative for abdominal pain, blood in stool, melena, nausea and vomiting.   Genitourinary: Negative for dysuria and hematuria.   Musculoskeletal: Negative for falls, joint pain and myalgias.   Skin: Negative for itching and rash.   Neurological: Negative for dizziness, sensory change, speech change, loss of consciousness and headaches.   Endo/Heme/Allergies: Negative for environmental allergies. Does not bruise/bleed easily.   Psychiatric/Behavioral: Positive for memory loss. Negative for depression, hallucinations and suicidal ideas.        Objective:   /52   Pulse 88   Ht 1.753 m (5' 9\")   Wt 64 kg (141 lb)   SpO2 96%   BMI 20.82 kg/m²      Physical Exam   Constitutional: He is oriented to person, place, and time. No distress.   HENT:   Head: Normocephalic and atraumatic.   Right Ear: External ear normal.   Left Ear: External ear normal.   Eyes: Right eye exhibits no discharge. Left eye exhibits no discharge.   Neck: No JVD present. No thyromegaly present.   Cardiovascular: Normal rate, regular rhythm, normal heart sounds and intact distal pulses.  Exam reveals no gallop and no friction rub.    No murmur heard.  Pulmonary/Chest: Breath sounds normal. No respiratory distress.   Abdominal: Bowel sounds are normal. He " exhibits no distension. There is no tenderness.   Musculoskeletal: He exhibits no edema or tenderness.   Neurological: He is alert and oriented to person, place, and time. No cranial nerve deficit.   Skin: Skin is warm and dry. He is not diaphoretic.   Psychiatric: He has a normal mood and affect. His behavior is normal.   Nursing note and vitals reviewed.      Assessment:     1. ACC/AHA stage C systolic heart failure (HCC)     2. Heart failure, NYHA class 2 (HCC)     3. Ischemic cardiomyopathy     4. Stented coronary artery     5. Mixed hyperlipidemia     6. Alcohol use     7. Tobacco abuse     8. Premature atrial complexes     9. Abdominal aneurysm (HCC)     10. High risk medication use         Medical Decision Making:  Today's Assessment / Status / Plan:   At this time patient is clinically stable in terms of his cardiac standpoint.  Cont current medications at current dose.     I suspect that there is some baseline dementia in this patient.  Patient repeatedly asked me about the nature of his medication and why he is taking the medications.  He does not remember that I went over all of the details with him in prior visits.    I spent 5 minutes talking to patient about the danger of smoking. I advised patient and counseled patient on smoking cessation. Patient has promised to achieve goal of zero cigarettes per day.        Baldo Fajardo M.D.  [address] [facility] [address]  VIA Facsimile: [phone]

## 2018-07-12 ENCOUNTER — TELEPHONE (OUTPATIENT)
Dept: VASCULAR LAB | Facility: MEDICAL CENTER | Age: 73
End: 2018-07-12

## 2018-07-12 DIAGNOSIS — I71.40 ABDOMINAL ANEURYSM (HCC): ICD-10-CM

## 2018-08-02 ENCOUNTER — TELEPHONE (OUTPATIENT)
Dept: CARDIOLOGY | Facility: MEDICAL CENTER | Age: 73
End: 2018-08-02

## 2018-08-02 NOTE — TELEPHONE ENCOUNTER
" pt being admitted to hospital   Received: Today   Message Contents   Pilar Baeza R.N.   Phone Number: [phone]             TT/mary beth     Pt calling for clearance to get colonoscopy (has blood in stool) and needs effient & aspirin hold advice.  Pt is being admitted today to [facility] for colonoscopy which will be done tomorrow at 2pm.  (he will be in Room 206)     Colonoscopy is ordered by Dr Jim from pt's PCP group \"[facility]\" [phone], [place] office.     Please call Khai at [phone].  Pt states he will wait for your call before leaving for the hospital.      Returned patient call. Pt states lower GI bleeding x 2 weeks, stopped yesterday. Some today, very little. Pt states hemorrhoidal history also.      Pt states PCP wanted to call an ambulance, pt refused. Pt cautioned against this. Pt states he gets lightheaded. But can drive.  Pt states he feels like he might fall down when walking but is OK if sitting and can drive. Strongly encouraged to get a ride to the hospital. Pt refuses.     Pt concerned about stopping meds. Pts last dose was this AM. Explained to patient that once admitted to the hosp, the providers there would take over his care and make those decisions. Pt encouraged to proceed to hospital as directed. Pt concerned about stent. Pt reassured that his stent was 9/1/17 and that excessive bleeding was not life sustaining and a thorough work up was important. Pt states understanding.     TT aware of call.   "

## 2018-08-20 ENCOUNTER — OFFICE VISIT (OUTPATIENT)
Dept: CARDIOLOGY | Facility: MEDICAL CENTER | Age: 73
End: 2018-08-20
Payer: MEDICARE

## 2018-08-20 ENCOUNTER — TELEPHONE (OUTPATIENT)
Dept: CARDIOLOGY | Facility: MEDICAL CENTER | Age: 73
End: 2018-08-20

## 2018-08-20 VITALS
BODY MASS INDEX: 19.96 KG/M2 | OXYGEN SATURATION: 100 % | WEIGHT: 134.8 LBS | HEART RATE: 82 BPM | HEIGHT: 69 IN | DIASTOLIC BLOOD PRESSURE: 60 MMHG | SYSTOLIC BLOOD PRESSURE: 90 MMHG

## 2018-08-20 DIAGNOSIS — I25.5 ISCHEMIC CARDIOMYOPATHY: ICD-10-CM

## 2018-08-20 DIAGNOSIS — I25.10 CORONARY ARTERY DISEASE INVOLVING NATIVE CORONARY ARTERY OF NATIVE HEART WITHOUT ANGINA PECTORIS: ICD-10-CM

## 2018-08-20 DIAGNOSIS — Z72.0 TOBACCO ABUSE: ICD-10-CM

## 2018-08-20 DIAGNOSIS — I50.20 ACC/AHA STAGE C SYSTOLIC HEART FAILURE (HCC): ICD-10-CM

## 2018-08-20 DIAGNOSIS — Z78.9 ALCOHOL USE: ICD-10-CM

## 2018-08-20 DIAGNOSIS — E78.2 MIXED HYPERLIPIDEMIA: ICD-10-CM

## 2018-08-20 DIAGNOSIS — Z95.5 STENTED CORONARY ARTERY: ICD-10-CM

## 2018-08-20 DIAGNOSIS — R00.0 HEART RATE FAST: ICD-10-CM

## 2018-08-20 DIAGNOSIS — I50.9 HEART FAILURE, NYHA CLASS 2 (HCC): ICD-10-CM

## 2018-08-20 LAB — EKG IMPRESSION: NORMAL

## 2018-08-20 PROCEDURE — 99214 OFFICE O/P EST MOD 30 MIN: CPT | Performed by: NURSE PRACTITIONER

## 2018-08-20 PROCEDURE — 93000 ELECTROCARDIOGRAM COMPLETE: CPT | Performed by: NURSE PRACTITIONER

## 2018-08-20 ASSESSMENT — ENCOUNTER SYMPTOMS
PND: 0
DIZZINESS: 0
PALPITATIONS: 0
ABDOMINAL PAIN: 1
MYALGIAS: 0
CLAUDICATION: 0
SHORTNESS OF BREATH: 1
FEVER: 0
COUGH: 0
BACK PAIN: 1
ORTHOPNEA: 0

## 2018-08-20 NOTE — TELEPHONE ENCOUNTER
Records received from [facility].  Attempted to call patient to review that he had an A. fib episode.  Left message for patient to call office back to discuss anticoagulation.

## 2018-08-20 NOTE — PROGRESS NOTES
Chief Complaint   Patient presents with   • MI (Non ST Segment Elevation MI)     Follow up       Subjective:   Khai Munoz is a 72 y.o. male who presents today for follow-up on his heart failure and his recent ER visit for fast heart rate with his wife, Ynes.    Patient was last seen in clinic on 7/6/2018 of Dr. Davila.  During his last visit no changes are made to his medical regimen.    Since his last visit, patient did have an episode of diverticulitis and was treated with antibiotics.  Patient also states he went to the emergency room on 8/10/2018 after seeing his primary care.  Patient was found to be in a fast heart rate.  Patient states the doctors increase his carvedilol back up to 6.25 mg twice a day. Pt is unsure if it was a fib episode. Pt was not started on OAC.    For his symptoms, patient does report some fatigue and mild shortness breath with exertion.  Patient also reporting occasional abdominal pain and flank pain.  Patient also has chest wall tenderness.  He denies any injuries.  He denies other chest pain, palpitations, orthopnea, PND, edema or dizziness/lightheadedness.    He is not weighing himself regularly at home.    Patient does report he has not had any alcohol for about 2 and half weeks due to his diverticulitis episode.    Additonally, patient has the following medical problems:    -Ischemic cardiomyopathy with LVEF of 35% with a stent to the LAD on 9/1/2017    -CKD    -Continues to drink about 2 shots per day and about smokes 4 cigarettes per day    -AAA repair    -PVD    Past Medical History:   Diagnosis Date   • Arrhythmia     afib   • Asthma     as a child   • Breath shortness    • Cancer ([facility]) 2006    prostate   • Chronic cough    • Dental disorder     dental implants, bottom dentures implanted, upper, click on   • Emphysema of lung ([facility])    • High cholesterol    • Psychiatric problem     anxiety and depression   • 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[facility])    • Tinnitus    • Urinary bladder  disorder     history of UTI     Past Surgical History:   Procedure Laterality Date   • ANGIOPLASTY  09/2017   • AAA WITH STENT GRAFT  2/15/2017    Procedure: AAA WITH STENT GRAFT FOR: ENDOVASCULAR REPAIR OF INFRARENAL ABDOMINAL AORTIC ANEURYSM USING TWO DOCKING LIMBS;  Surgeon: Calvin Paulino M.D.;  Location: SURGERY [facility];  Service:    • CYST EXCISION      scrotal   • OTHER      brachy therapy and radiation for prostate ca   • OTHER      dental implants     Family History   Problem Relation Age of Onset   • Heart Disease Mother    • GI Sister    • Diabetes Sister      Social History     Social History   • Marital status:      Spouse name: N/A   • Number of children: N/A   • Years of education: N/A     Occupational History   • Not on file.     Social History Main Topics   • Smoking status: Current Every Day Smoker     Packs/day: 0.25     Years: 50.00     Types: Cigarettes   • Smokeless tobacco: Never Used   • Alcohol use 3.6 - 4.8 oz/week     6 - 8 Shots of liquor per week      Comment: 375ml per day fo [employer] [place]   • Drug use: Yes      Comment: daily marijuana   • Sexual activity: Not on file     Other Topics Concern   • Not on file     Social History Narrative   • No narrative on file     Allergies   Allergen Reactions   • Declomycin      Reaction a long time ago     Outpatient Encounter Prescriptions as of 8/20/2018   Medication Sig Dispense Refill   • carvedilol (COREG) 3.125 MG Tab Take 6.25 mg by mouth 2 times a day.     • spironolactone (ALDACTONE) 25 MG Tab Take 1 Tab by mouth every day. 30 Tab 3   • ipratropium (ATROVENT HFA) 17 MCG/ACT Aero Soln Inhale 2 Puffs by mouth every 6 hours.     • buPROPion (WELLBUTRIN XL) 150 MG XL tablet Take 150 mg by mouth every day.     • prasugrel (EFFIENT) 10 MG Tab Take 1 Tab by mouth every day. 90 Tab 3   • lisinopril (PRINIVIL) 5 MG Tab Take 1 Tab by mouth every day. 90 Tab 3   • atorvastatin (LIPITOR) 80 MG tablet Take 1 Tab by mouth every day. 90  "Tab 3   • thiamine (THIAMINE) 100 MG tablet Take 1 Tab by mouth every day. 90 Tab 3   • tamsulosin (FLOMAX) 0.4 MG capsule Take 0.8 mg by mouth every bedtime.     • ADVAIR DISKUS 250-50 MCG/DOSE AEROSOL POWDER, BREATH ACTIVATED Take 1 Puff by mouth 2 Times a Day.  0   • pyridoxine 100 MG tablet Take 100 mg by mouth every day. Vitamin B-6     • aspirin 81 MG tablet Take 81 mg by mouth every day.     • fluticasone (FLONASE) 50 MCG/ACT nasal spray PLACE 1 SPRAY INTO THE NOSTRIL(S) 2 (TWO) TIMES DAILY  2     No facility-administered encounter medications on file as of 8/20/2018.      Review of Systems   Constitutional: Positive for malaise/fatigue. Negative for fever.   Respiratory: Positive for shortness of breath. Negative for cough.    Cardiovascular: Positive for chest pain (chest wall tenderness). Negative for palpitations, orthopnea, claudication, leg swelling and PND.   Gastrointestinal: Positive for abdominal pain.   Musculoskeletal: Positive for back pain (flank pain). Negative for myalgias.   Neurological: Negative for dizziness.   All other systems reviewed and are negative.       Objective:   BP (!) 90/60   Pulse 82   Ht 1.753 m (5' 9\")   Wt 61.1 kg (134 lb 12.8 oz)   SpO2 100%   BMI 19.91 kg/m²     Physical Exam   Constitutional: He is oriented to person, place, and time. He appears well-developed and well-nourished.   HENT:   Head: Normocephalic and atraumatic.   Eyes: Pupils are equal, round, and reactive to light. EOM are normal.   Neck: Normal range of motion. Neck supple. No JVD present.   Cardiovascular: Normal rate, regular rhythm and normal heart sounds.    Pulmonary/Chest: Effort normal and breath sounds normal. No respiratory distress. He has no wheezes. He has no rales. He exhibits tenderness (at strenum).   Abdominal: Soft. Bowel sounds are normal.   Musculoskeletal: He exhibits no edema.   Neurological: He is alert and oriented to person, place, and time.   Skin: Skin is warm and dry. " "  Psychiatric: He has a normal mood and affect. His behavior is normal.   Vitals reviewed.    No results found for: CHOLSTRLTOT, LDL, HDL, TRIGLYCERIDE    Lab Results   Component Value Date/Time    SODIUM 136 09/05/2017 02:26 AM    POTASSIUM 3.7 09/05/2017 02:26 AM    CHLORIDE 107 09/05/2017 02:26 AM    CO2 22 09/05/2017 02:26 AM    GLUCOSE 98 09/05/2017 02:26 AM    BUN 21 09/05/2017 02:26 AM    CREATININE 1.91 (H) 09/05/2017 02:26 AM     Lab Results   Component Value Date/Time    ALKPHOSPHAT 17 (L) 09/04/2017 01:46 AM    ASTSGOT 43 09/04/2017 01:46 AM    ALTSGPT 23 09/04/2017 01:46 AM    TBILIRUBIN 0.7 09/04/2017 01:46 AM      Transthoracic Echo Report 1/23/17  No prior study is available for comparison.   Mitral annular calcification. Heavily calcified anterior mitral   leaflet. No mitral stenosis. Moderate eccentric mitral regurgitation.  Structurally normal aortic valve. Aortic sclerosis without stenosis.   Calcified aortic valve leaflets. No aortic insufficiency.  Normal pericardium without effusion.    Transthoracic Echo Report 9/1/17  Left ventricular ejection fraction is visually estimated to be 35%.    Wall motion abnormalities as described.  There is eccentric, posteriorly directed mitral regurgitation which is   moderate to severe.  Moderate tricuspid regurgitation.  Right ventricular systolic pressure is estimated to be 60 mmHg.        Heart cath 9/1/17  IMPRESSION:  1.  Successful stenting of subtotal stenosis of the proximal left anterior descending with a 3.5x18 mm Xience Alpine stent.  2.  A 40% lesion of the proximal right coronary artery.  3.  A 40% lesion in the mid obtuse marginal artery.  4.  Stenosis at the origin of a \"jailed\" diagonal artery.  This lesion could not be angioplastied as it would not pass through the side of the stent.  5.  Elevated LVEDP.  6.  LV dysfunction with EF in the 45% range.     Labs in Media Reviewed with patient from 2/8/18    Assessment:     1. Heart rate fast  RIH " SURAJ EKG (Clinic Performed)   2. ACC/AHA stage C systolic heart failure (HCC)     3. Heart failure, NYHA class 2 (HCC)     4. Ischemic cardiomyopathy     5. Stented coronary artery     6. Coronary artery disease involving native coronary artery of native heart without angina pectoris     7. Mixed hyperlipidemia     8. Alcohol use     9. Tobacco abuse         Medical Decision Making:  Today's Assessment / Status / Plan:   1.  Fast heart rate: EKG today shows sinus rhythm 87 with PVCs  -Requesting records from [facility] on 8/10/2018  -Pt waited in the office for approximately 1 hour and no records were faxed/received.  -Discussed with patient that he will be notified once his records received for further plan of care.    2. HFrEF, Stage C, Class 2, LVEF 35%: Based on physical examination findings, patient is euvolemic. No JVD, lungs are clear to auscultation, no pitting edema in bilateral lower extremities, no ascites.  -Continue spironolactone 25 mg daily  -Continue carvedilol 6.25 mg twice a day  -Continue lisinopril 5 mg daily  -Reinforced s/sx of worsening heart failure with patient and weight monitoring. Pt verbalizes understanding. Pt to call office or RTC if present.     2.  CAD, s/p Stent to LAD/DLD:   -Continue aspirin 81 mg daily  -Continue atorvastatin 80 mg nightly  -Continue Effient 10 mg daily    3.  Alcohol tobacco use:  -Encouraged complete cessation of alcohol and smoking    FU in clinic in 4 weeks with Dr. Davila or myself. Sooner if needed.    Patient verbalizes understanding and agrees with the plan of care.     Collaborating MD: Alfie Bautista MD    Addendum 8/21/2018: Pt called the clinic back. Phone call returned. Discussed with patient that his records did show afib with RVR. Discussed with patient about starting an OAC. Pt reports he is tired/fatigued and has had some recent falls at home d/t being unsteady. [id number] score is 4, HAS-BLED score of 3. However, d/t pt high  risk for falls and bleeding risk, recent diverticulitis episode, he is not a good candidate for oral anticoagulation. Will keep patient on Effient and Aspirin at this time. Pt verbalizes understanding. All questions answered.

## 2018-08-22 ENCOUNTER — TELEPHONE (OUTPATIENT)
Dept: VASCULAR LAB | Facility: MEDICAL CENTER | Age: 73
End: 2018-08-22

## 2018-08-22 NOTE — TELEPHONE ENCOUNTER
Spoke with patient to remind him to schedule his Aortoilliac Duplex done (1 yr). Patient states that he will have this done at [facility]. Order faxed over to [phone].

## 2018-09-11 ENCOUNTER — TELEPHONE (OUTPATIENT)
Dept: VASCULAR LAB | Facility: MEDICAL CENTER | Age: 73
End: 2018-09-11

## 2018-09-12 NOTE — TELEPHONE ENCOUNTER
Recent imaging results from [facility] reviewed in accordance with [facility] surveillance guideline  Shows stable stent graft without endoleak with mild interval decrease in size of residual aneurysm sac    Patient should follow-up with vascular surgery as needed or scheduled  We will defer further medical management to cardiology    Anticipate repeat aortoiliac duplex in one year per institution guideline    Michael J. Bloch, MD  Vascular Care     CC:  BEBO Fajardo

## 2018-09-24 ENCOUNTER — OFFICE VISIT (OUTPATIENT)
Dept: CARDIOLOGY | Facility: MEDICAL CENTER | Age: 73
End: 2018-09-24
Payer: MEDICARE

## 2018-09-24 VITALS
DIASTOLIC BLOOD PRESSURE: 52 MMHG | HEART RATE: 94 BPM | OXYGEN SATURATION: 100 % | SYSTOLIC BLOOD PRESSURE: 100 MMHG | WEIGHT: 136 LBS | BODY MASS INDEX: 20.14 KG/M2 | HEIGHT: 69 IN

## 2018-09-24 DIAGNOSIS — I25.10 CORONARY ARTERY DISEASE INVOLVING NATIVE CORONARY ARTERY OF NATIVE HEART WITHOUT ANGINA PECTORIS: ICD-10-CM

## 2018-09-24 DIAGNOSIS — Z79.899 HIGH RISK MEDICATION USE: ICD-10-CM

## 2018-09-24 DIAGNOSIS — I50.20 ACC/AHA STAGE C SYSTOLIC HEART FAILURE (HCC): ICD-10-CM

## 2018-09-24 DIAGNOSIS — Z95.5 STENTED CORONARY ARTERY: ICD-10-CM

## 2018-09-24 DIAGNOSIS — Z72.0 TOBACCO ABUSE: ICD-10-CM

## 2018-09-24 DIAGNOSIS — I50.9 HEART FAILURE, NYHA CLASS 2 (HCC): ICD-10-CM

## 2018-09-24 DIAGNOSIS — Z78.9 ALCOHOL USE: ICD-10-CM

## 2018-09-24 DIAGNOSIS — I49.1 PREMATURE ATRIAL COMPLEXES: ICD-10-CM

## 2018-09-24 DIAGNOSIS — E78.2 MIXED HYPERLIPIDEMIA: ICD-10-CM

## 2018-09-24 DIAGNOSIS — I25.5 ISCHEMIC CARDIOMYOPATHY: ICD-10-CM

## 2018-09-24 PROCEDURE — 99406 BEHAV CHNG SMOKING 3-10 MIN: CPT | Mod: 25 | Performed by: INTERNAL MEDICINE

## 2018-09-24 PROCEDURE — 99214 OFFICE O/P EST MOD 30 MIN: CPT | Mod: 25 | Performed by: INTERNAL MEDICINE

## 2018-09-24 RX ORDER — CARVEDILOL 6.25 MG/1
6.25 TABLET ORAL 2 TIMES DAILY WITH MEALS
Qty: 180 TAB | Refills: 3 | Status: SHIPPED | OUTPATIENT
Start: 2018-09-24 | End: 2019-03-05 | Stop reason: SDUPTHER

## 2018-09-24 RX ORDER — BUDESONIDE 3 MG/1
3 CAPSULE, COATED PELLETS ORAL EVERY MORNING
COMMUNITY
Start: 2018-09-21 | End: 2018-12-19

## 2018-09-24 RX ORDER — CLOPIDOGREL BISULFATE 75 MG/1
75 TABLET ORAL DAILY
Qty: 90 TAB | Refills: 3 | Status: SHIPPED | OUTPATIENT
Start: 2018-09-24 | End: 2019-06-26

## 2018-09-24 RX ORDER — MESALAMINE 1.2 G/1
1.2 TABLET, DELAYED RELEASE ORAL
COMMUNITY
Start: 2018-09-13 | End: 2019-06-26

## 2018-09-24 ASSESSMENT — MINNESOTA LIVING WITH HEART FAILURE QUESTIONNAIRE (MLHF)
SWELLING IN ANKLES OR LEGS: 0
DIFFICULTY WITH RECREATIONAL PASTIMES, SPORTS, HOBBIES: 5
DIFFICULTY TO CONCENTRATE OR REMEMBERING THINGS: 0
DIFFICULTY WITH SEXUAL ACTIVITIES: 0
FEELING LIKE A BURDEN TO FAMILY AND FRIENDS: 4
COSTING YOU MONEY FOR MEDICAL CARE: 3
WALKING ABOUT OR CLIMBING STAIRS DIFFICULT: 5
TIRED, FATIGUED OR LOW ON ENERGY: 5
DIFFICULTY SLEEPING WELL AT NIGHT: 0
WORKING AROUND THE HOUSE OR YARD DIFFICULT: 5
LOSS OF SELF CONTROL IN YOUR LIFE: 5
MAKING YOU STAY IN A HOSPITAL: 0
GIVING YOU SIDE EFFECTS FROM TREATMENTS: 0
MAKING YOU FEEL DEPRESSED: 0
MAKING YOU SHORT OF BREATH: 2
DIFFICULTY GOING AWAY FROM HOME: 4
MAKING YOU WORRY: 2
TOTAL_SCORE: 48
DIFFICULTY SOCIALIZING WITH FAMILY OR FRIENDS: 4
EATING LESS FOODS YOU LIKE: 1
DIFFICULTY WORKING TO EARN A LIVING: 0
HAVING TO SIT OR LIE DOWN DURING THE DAY: 3

## 2018-09-24 ASSESSMENT — ENCOUNTER SYMPTOMS
ABDOMINAL PAIN: 0
HEADACHES: 0
DIZZINESS: 0
FALLS: 0
BRUISES/BLEEDS EASILY: 0
BLURRED VISION: 0
SHORTNESS OF BREATH: 1
FEVER: 0
DIAPHORESIS: 0
MEMORY LOSS: 0
PALPITATIONS: 0
MYALGIAS: 0
COUGH: 0
DEPRESSION: 0
DOUBLE VISION: 0
SENSORY CHANGE: 0

## 2018-09-24 ASSESSMENT — NEW YORK HEART ASSOCIATION (NYHA) CLASSIFICATION: NYHA FUNCTIONAL CLASS: CLASS II

## 2018-09-24 NOTE — PROGRESS NOTES
"Education Narrative  Reviewed anatomy and physiology of heart failure with patient and wife, Ynes. Went over heart failure worksheet and patient's individual HF diagnosis, EF, risk factors, general medication classes and indications, as well as personal goals.  Goals: Patient's primary goal is begin weighing himself daily and reading sodium levels     Discussed daily weights, sodium restriction, worsening signs and symptoms to report to physician, heart medications, and importance of adherence to medication regimen. Emphasized recommendation from AHA/AAHFN to keep daily sodium intake between 1500mg-2000mg.      Reviewed dietary handouts, advanced care planning classes, and advance directive planning handout. Discussed dietary considerations and reviewed Seven Day Heart Healthy Meal Plan by [employer].     Invited patient and family members/friends to HF support group and encouraged patient to call Heart Failure clinic during normal business hours with any questions.  Heart Failure program card with number given to patient.           Patient states full understanding of all information given.       OP Heart Failure  Vitals     Weight: 61.7 kg (136 lb)        Height: 175.3 cm (5' 9\")  BMI (Calculated): 20.08  Blood Pressure : 100/52  Pulse: 94    System Assessment  NYHA Functional Class Assessment: Class II  ACC/AHA HF Stage: C    Smoking Hx  Have you Ever Smoked: Yes  Have you Smoked in the Last 12 Mos: Yes  Have you Quit Smoking: No      Smoking Cessation Offered: Patient Counseled    Alcohol Hx  Do you Drink?: No                            Illicit Drug Hx  Illicit Drug History: No    Social Hx  Social History: Lives with Spouse  Level of Support: Good  Advance Directives: None, Began discussion, Paperwork provided    Education  Symptoms: Verbalizes understanding, Needs Reinforcement  Weighing: Needs Reinforcement  Weight Gain Response: Needs Reinforcement   Recording Data: Needs Reinforcement  Teach Back " Failures: Teach Back Successful  Compliance: Changes in Diet       Medications  Medication Reconciliation : Complete  Medication Counseling Provided: Verbalizes Understanding  Able to Accurately Identify Medication Indications: Some  Medication Discrepancies: None  Is Patient on an Evidence Based Beta Blocker: Yes  Is Patient on ACE-1 or ARB: Yes    Dietary Assessment  Food Labels: Needs Reinforcement  Foods High in Sodium: Needs Reinforcement  Daily Sodium Intake: Needs Reinforcement  Diet: Needs Reinforcement  Food Preparation: Needs Reinforcement  Eating Out Plan: Needs Reinforcement  Healthier Options: Needs Reinforcement  Fluid Restriction: Not Applicable    MN Living with Heart Failure  Swelling in Ankles or Legs: 0  Having to Sit or Lie Down During the Day: 3  Walking About or Climbing Stairs Difficult: 5  Working Around the House or Yard Difficult: 5  Difficulty Going Away from Home: 4  Difficulty Sleeping Well at Night: 0  Difficulty Socializing with Family or Friends: 4  Difficulty Working to Earn a Livin  Difficulty with Recreational Pastimes, Sports, Hobbies: 5  Difficulty with Sexual Activities: 0  Eating Less Foods You Like: 1  Making you Short of Breath: 2  Tired, Fatigued or Low on Energy: 5  Making you Stay in a Hospital: 0  Costing you Money for Medical Care?: 3  Giving you Side Effects from Treatments: 0  Feeling like a [place] to Family and Friends: 4  Loss of Self Control in your Life: 5  Making You Worry: 2  Difficulty to Concentrate or Remembering Things: 0  Making you Feel Depressed: 0  MLHF Total Score : 48    6 Minute Walk Test        Comments: Unable to walk due to SOB and Fatigued

## 2018-09-24 NOTE — PROGRESS NOTES
Chief Complaint   Patient presents with   • Congestive Heart Failure     NP       Subjective:   Khai Munoz is a 72 y.o. male who presents today For cardiac care after his recent hospitalization due to non-ST elevation myocardial infarction episode. Patient underwent PCI and stenting of his proximal LAD. He also has a history of peripheral vascular disease for which he underwent AAA repair in February 2017. Patient is doing okay right now. No chest pain. He does get winded with daily living activities. His left ventricular systolic function dropped to 35% after his myocardial infarction episode. He is currently still drinking and smoking.     Patient still gets winded with daily living activities and exertion. No symptoms at rest.     No clinical change since last visit.     Stopped ETOH 6 weeks ago.    Still smoking.    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[facility])    • Tinnitus    • Urinary bladder disorder     history of UTI     Past Surgical History:   Procedure Laterality Date   • ANGIOPLASTY  09/2017   • AAA WITH STENT GRAFT  2/15/2017    Procedure: AAA WITH STENT GRAFT FOR: ENDOVASCULAR REPAIR OF INFRARENAL ABDOMINAL AORTIC ANEURYSM USING TWO DOCKING LIMBS;  Surgeon: Calvin Paulino M.D.;  Location: SURGERY [facility];  Service:    • CYST EXCISION      scrotal   • OTHER      brachy therapy and radiation for prostate ca   • OTHER      dental implants     Family History   Problem Relation Age of Onset   • Heart Disease Mother    • GI Sister    • Diabetes Sister      Social History     Social History   • Marital status:      Spouse name: N/A   • Number of children:  N/A   • Years of education: N/A     Occupational History   • Not on file.     Social History Main Topics   • Smoking status: Current Every Day Smoker     Packs/day: 0.25     Years: 50.00     Types: Cigarettes   • Smokeless tobacco: Never Used   • Alcohol use 3.6 - 4.8 oz/week     6 - 8 Shots of liquor per week      Comment: 375ml per day fo [place]   • Drug use: Yes      Comment: daily marijuana   • Sexual activity: Not on file     Other Topics Concern   • Not on file     Social History Narrative   • No narrative on file     Allergies   Allergen Reactions   • Declomycin      Reaction a long time ago     Outpatient Encounter Prescriptions as of 9/24/2018   Medication Sig Dispense Refill   • budesonide (ENTOCORT EC) 3 MG Cap DR Particles capsule      • mesalamine (LIALDA) 1.2 GM Tablet Delayed Response      • Probiotic Product (PROBIOTIC-10 PO) Take  by mouth.     • carvedilol (COREG) 6.25 MG Tab Take 1 Tab by mouth 2 times a day, with meals. 180 Tab 3   • clopidogrel (PLAVIX) 75 MG Tab Take 1 Tab by mouth every day. 90 Tab 3   • spironolactone (ALDACTONE) 25 MG Tab Take 1 Tab by mouth every day. 30 Tab 3   • ipratropium (ATROVENT HFA) 17 MCG/ACT Aero Soln Inhale 2 Puffs by mouth every 6 hours.     • buPROPion (WELLBUTRIN XL) 150 MG XL tablet Take 150 mg by mouth every day.     • atorvastatin (LIPITOR) 80 MG tablet Take 1 Tab by mouth every day. 90 Tab 3   • thiamine (THIAMINE) 100 MG tablet Take 1 Tab by mouth every day. 90 Tab 3   • tamsulosin (FLOMAX) 0.4 MG capsule Take 0.8 mg by mouth every bedtime.     • ADVAIR DISKUS 250-50 MCG/DOSE AEROSOL POWDER, BREATH ACTIVATED Take 1 Puff by mouth 2 Times a Day.  0   • pyridoxine 100 MG tablet Take 100 mg by mouth every day. Vitamin B-6     • aspirin 81 MG tablet Take 81 mg by mouth every day.     • [DISCONTINUED] carvedilol (COREG) 3.125 MG Tab Take 6.25 mg by mouth 2 times a day.     • fluticasone (FLONASE) 50 MCG/ACT nasal spray PLACE 1 SPRAY INTO THE NOSTRIL(S) 2  "(TWO) TIMES DAILY  2   • lisinopril (PRINIVIL) 5 MG Tab Take 1 Tab by mouth every day. 90 Tab 3   • [DISCONTINUED] prasugrel (EFFIENT) 10 MG Tab Take 1 Tab by mouth every day. 90 Tab 3     No facility-administered encounter medications on file as of 9/24/2018.      Review of Systems   Constitutional: Negative for diaphoresis and fever.   HENT: Negative for nosebleeds.    Eyes: Negative for blurred vision and double vision.   Respiratory: Positive for shortness of breath. Negative for cough.    Cardiovascular: Negative for chest pain and palpitations.   Gastrointestinal: Negative for abdominal pain.   Genitourinary: Negative for dysuria and frequency.   Musculoskeletal: Negative for falls and myalgias.   Skin: Negative for rash.   Neurological: Negative for dizziness, sensory change and headaches.   Endo/Heme/Allergies: Does not bruise/bleed easily.   Psychiatric/Behavioral: Negative for depression and memory loss.        Objective:   /52 (BP Location: Left arm, Patient Position: Sitting, BP Cuff Size: Adult)   Pulse 94   Ht 1.753 m (5' 9\")   Wt 61.7 kg (136 lb)   SpO2 100%   BMI 20.08 kg/m²     Physical Exam   Constitutional: He is oriented to person, place, and time. No distress.   HENT:   Head: Normocephalic and atraumatic.   Right Ear: External ear normal.   Left Ear: External ear normal.   Eyes: Right eye exhibits no discharge. Left eye exhibits no discharge.   Neck: No JVD present. No thyromegaly present.   Cardiovascular: Normal rate, regular rhythm, normal heart sounds and intact distal pulses.  Exam reveals no gallop and no friction rub.    No murmur heard.  Pulmonary/Chest: Breath sounds normal. No respiratory distress.   Abdominal: Bowel sounds are normal. He exhibits no distension. There is no tenderness.   Musculoskeletal: He exhibits no edema or tenderness.   Neurological: He is alert and oriented to person, place, and time. No cranial nerve deficit.   Skin: Skin is warm and dry. He is not " diaphoretic.   Psychiatric: He has a normal mood and affect. His behavior is normal.   Nursing note and vitals reviewed.      Assessment:     1. ACC/AHA stage C systolic heart failure (HCC)  carvedilol (COREG) 6.25 MG Tab    clopidogrel (PLAVIX) 75 MG Tab   2. Heart failure, NYHA class 2 (HCC)  carvedilol (COREG) 6.25 MG Tab    clopidogrel (PLAVIX) 75 MG Tab   3. Ischemic cardiomyopathy  carvedilol (COREG) 6.25 MG Tab    clopidogrel (PLAVIX) 75 MG Tab   4. Stented coronary artery  clopidogrel (PLAVIX) 75 MG Tab   5. Coronary artery disease involving native coronary artery of native heart without angina pectoris  clopidogrel (PLAVIX) 75 MG Tab   6. Mixed hyperlipidemia     7. Alcohol use     8. Tobacco abuse     9. Premature atrial complexes     10. High risk medication use         Medical Decision Making:  Today's Assessment / Status / Plan:   Today, based on physical examination findings, patient is euvolemic. No JVD, lungs are clear to auscultation, no pitting edema in bilateral lower extremities, no ascites.    Dry weight is 136 lbs.    Cont current medications at current dose.     Will switch effient to clopidogrel 75 mg po daily.    I spent 5 minutes talking to patient about the danger of smoking. I advised patient and counseled patient on smoking cessation. Patient has promised to achieve goal of zero cigarettes per day.    Will continue to closely monitor for side effects of patient's high risk medication(s) including liver, renal function and electrolytes.    I will see patient back in our Heart Failure Clinic with lab tests and studies results in 12 weeks.    I thank you Dr. Fajardo for referring patient to our Heart Failure Clinic today.

## 2018-09-24 NOTE — LETTER
[facility]   [address], [address] [address]  [place] [address]  Phone: [phone]  Fax: [phone]              Khai Munoz  1945    Encounter Date: 9/24/2018    James Davila M.D.          PROGRESS NOTE:  Chief Complaint   Patient presents with   • Congestive Heart Failure     NP       Subjective:   Khai Munoz is a 72 y.o. male who presents today For cardiac care after his recent hospitalization due to non-ST elevation myocardial infarction episode. Patient underwent PCI and stenting of his proximal LAD. He also has a history of peripheral vascular disease for which he underwent AAA repair in February 2017. Patient is doing okay right now. No chest pain. He does get winded with daily living activities. His left ventricular systolic function dropped to 35% after his myocardial infarction episode. He is currently still drinking and smoking.     Patient still gets winded with daily living activities and exertion. No symptoms at rest.     No clinical change since last visit.     Stopped ETOH 6 weeks ago.    Still smoking.    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[facility])    • Tinnitus    • Urinary bladder disorder     history of UTI     Past Surgical History:   Procedure Laterality Date   • ANGIOPLASTY  09/2017   • AAA WITH STENT GRAFT  2/15/2017    Procedure: AAA WITH STENT GRAFT FOR: ENDOVASCULAR REPAIR OF INFRARENAL ABDOMINAL AORTIC ANEURYSM USING TWO DOCKING LIMBS;  Surgeon: Calvin Paulino M.D.;  Location: SURGERY [facility];  Service:    • CYST EXCISION      scrotal   • OTHER      brachy therapy and radiation  for prostate ca   • OTHER      dental implants     Family History   Problem Relation Age of Onset   • Heart Disease Mother    • GI Sister    • Diabetes Sister      Social History     Social History   • Marital status:      Spouse name: N/A   • Number of children: N/A   • Years of education: N/A     Occupational History   • Not on file.     Social History Main Topics   • Smoking status: Current Every Day Smoker     Packs/day: 0.25     Years: 50.00     Types: Cigarettes   • Smokeless tobacco: Never Used   • Alcohol use 3.6 - 4.8 oz/week     6 - 8 Shots of liquor per week      Comment: 375ml per day fo grand deyaer   • Drug use: Yes      Comment: daily marijuana   • Sexual activity: Not on file     Other Topics Concern   • Not on file     Social History Narrative   • No narrative on file     Allergies   Allergen Reactions   • Declomycin      Reaction a long time ago     Outpatient Encounter Prescriptions as of 9/24/2018   Medication Sig Dispense Refill   • budesonide (ENTOCORT EC) 3 MG Cap DR Particles capsule      • mesalamine (LIALDA) 1.2 GM Tablet Delayed Response      • Probiotic Product (PROBIOTIC-10 PO) Take  by mouth.     • carvedilol (COREG) 6.25 MG Tab Take 1 Tab by mouth 2 times a day, with meals. 180 Tab 3   • clopidogrel (PLAVIX) 75 MG Tab Take 1 Tab by mouth every day. 90 Tab 3   • spironolactone (ALDACTONE) 25 MG Tab Take 1 Tab by mouth every day. 30 Tab 3   • ipratropium (ATROVENT HFA) 17 MCG/ACT Aero Soln Inhale 2 Puffs by mouth every 6 hours.     • buPROPion (WELLBUTRIN XL) 150 MG XL tablet Take 150 mg by mouth every day.     • atorvastatin (LIPITOR) 80 MG tablet Take 1 Tab by mouth every day. 90 Tab 3   • thiamine (THIAMINE) 100 MG tablet Take 1 Tab by mouth every day. 90 Tab 3   • tamsulosin (FLOMAX) 0.4 MG capsule Take 0.8 mg by mouth every bedtime.     • ADVAIR DISKUS 250-50 MCG/DOSE AEROSOL POWDER, BREATH ACTIVATED Take 1 Puff by mouth 2 Times a Day.  0   • pyridoxine 100 MG tablet Take  "100 mg by mouth every day. Vitamin B-6     • aspirin 81 MG tablet Take 81 mg by mouth every day.     • [DISCONTINUED] carvedilol (COREG) 3.125 MG Tab Take 6.25 mg by mouth 2 times a day.     • fluticasone (FLONASE) 50 MCG/ACT nasal spray PLACE 1 SPRAY INTO THE NOSTRIL(S) 2 (TWO) TIMES DAILY  2   • lisinopril (PRINIVIL) 5 MG Tab Take 1 Tab by mouth every day. 90 Tab 3   • [DISCONTINUED] prasugrel (EFFIENT) 10 MG Tab Take 1 Tab by mouth every day. 90 Tab 3     No facility-administered encounter medications on file as of 9/24/2018.      Review of Systems   Constitutional: Negative for diaphoresis and fever.   HENT: Negative for nosebleeds.    Eyes: Negative for blurred vision and double vision.   Respiratory: Positive for shortness of breath. Negative for cough.    Cardiovascular: Negative for chest pain and palpitations.   Gastrointestinal: Negative for abdominal pain.   Genitourinary: Negative for dysuria and frequency.   Musculoskeletal: Negative for falls and myalgias.   Skin: Negative for rash.   Neurological: Negative for dizziness, sensory change and headaches.   Endo/Heme/Allergies: Does not bruise/bleed easily.   Psychiatric/Behavioral: Negative for depression and memory loss.        Objective:   /52 (BP Location: Left arm, Patient Position: Sitting, BP Cuff Size: Adult)   Pulse 94   Ht 1.753 m (5' 9\")   Wt 61.7 kg (136 lb)   SpO2 100%   BMI 20.08 kg/m²      Physical Exam   Constitutional: He is oriented to person, place, and time. No distress.   HENT:   Head: Normocephalic and atraumatic.   Right Ear: External ear normal.   Left Ear: External ear normal.   Eyes: Right eye exhibits no discharge. Left eye exhibits no discharge.   Neck: No JVD present. No thyromegaly present.   Cardiovascular: Normal rate, regular rhythm, normal heart sounds and intact distal pulses.  Exam reveals no gallop and no friction rub.    No murmur heard.  Pulmonary/Chest: Breath sounds normal. No respiratory distress.   "   Abdominal: Bowel sounds are normal. He exhibits no distension. There is no tenderness.   Musculoskeletal: He exhibits no edema or tenderness.   Neurological: He is alert and oriented to person, place, and time. No cranial nerve deficit.   Skin: Skin is warm and dry. He is not diaphoretic.   Psychiatric: He has a normal mood and affect. His behavior is normal.   Nursing note and vitals reviewed.      Assessment:     1. ACC/AHA stage C systolic heart failure (HCC)  carvedilol (COREG) 6.25 MG Tab    clopidogrel (PLAVIX) 75 MG Tab   2. Heart failure, NYHA class 2 (HCC)  carvedilol (COREG) 6.25 MG Tab    clopidogrel (PLAVIX) 75 MG Tab   3. Ischemic cardiomyopathy  carvedilol (COREG) 6.25 MG Tab    clopidogrel (PLAVIX) 75 MG Tab   4. Stented coronary artery  clopidogrel (PLAVIX) 75 MG Tab   5. Coronary artery disease involving native coronary artery of native heart without angina pectoris  clopidogrel (PLAVIX) 75 MG Tab   6. Mixed hyperlipidemia     7. Alcohol use     8. Tobacco abuse     9. Premature atrial complexes     10. High risk medication use         Medical Decision Making:  Today's Assessment / Status / Plan:   Today, based on physical examination findings, patient is euvolemic. No JVD, lungs are clear to auscultation, no pitting edema in bilateral lower extremities, no ascites.    Dry weight is 136 lbs.    Cont current medications at current dose.     Will switch effient to clopidogrel 75 mg po daily.    I spent 5 minutes talking to patient about the danger of smoking. I advised patient and counseled patient on smoking cessation. Patient has promised to achieve goal of zero cigarettes per day.    Will continue to closely monitor for side effects of patient's high risk medication(s) including liver, renal function and electrolytes.    I will see patient back in our Heart Failure Clinic with lab tests and studies results in 12 weeks.    I thank you Dr. Fajardo for referring patient to our Heart Failure Clinic  today.            Baldo Fajardo M.D.  [address] [facility] [address]  VIA Facsimile: [phone]

## 2018-09-26 DIAGNOSIS — I50.20 ACC/AHA STAGE C SYSTOLIC HEART FAILURE (HCC): ICD-10-CM

## 2018-09-26 DIAGNOSIS — I50.9 HEART FAILURE, NYHA CLASS 2 (HCC): ICD-10-CM

## 2018-09-26 DIAGNOSIS — I25.5 ISCHEMIC CARDIOMYOPATHY: ICD-10-CM

## 2018-09-26 RX ORDER — SPIRONOLACTONE 25 MG/1
25 TABLET ORAL DAILY
Qty: 30 TAB | Refills: 3 | Status: SHIPPED | OUTPATIENT
Start: 2018-09-26 | End: 2018-12-10 | Stop reason: SDUPTHER

## 2018-10-02 ENCOUNTER — TELEPHONE (OUTPATIENT)
Dept: CARDIOLOGY | Facility: MEDICAL CENTER | Age: 73
End: 2018-10-02

## 2018-10-02 NOTE — TELEPHONE ENCOUNTER
RE: plavix hold advice for colonoscopy   Received: Today   Message Contents   ANDREY Saldaña R.N.   Phone Number: [phone] [phone]             Yes    Previous Messages      ----- Message -----   From: Mary Beth Baeza R.N.   Sent: 10/2/2018   3:31 PM   To: Alfie Bautista M.D.   Subject: FW: plavix hold advice for colonoscopy           7 days OK?   Pt is approx 13+ month post stent   ----- Message -----   From: Pilar Walden   Sent: 10/2/2018   3:13 PM   To: Mary Beth Baeza R.N.   Subject: plavix hold advice for colonoscopy               TT/mary beth Oscar, RN with [facility], calling for plavix hold advice.  Previous colonoscopy was done, now needs another colonoscopy because of pt's diarrhea & rectal bleeding.     Please call or fax Celestina at #  [phone]   Fx# [phone]         Returned call. Placed on a lengthy hold. TANNER Oscar on phone from PCP office. GI clinic calling her to get hold info. Pt reportedly is to be scoped on 10/4 on an urgent basis. Explained 7 day Plavix hold and if the scope needs to be done then they should hold as long as they can and scope but they would need to make that judgement call. Since a 2nd hand clearance request is highly unusual. Celestina thanks me for returning her call.

## 2018-10-18 DIAGNOSIS — I50.20 ACC/AHA STAGE C SYSTOLIC HEART FAILURE (HCC): Primary | ICD-10-CM

## 2018-10-18 RX ORDER — LISINOPRIL 5 MG/1
5 TABLET ORAL DAILY
Qty: 90 TAB | Refills: 3 | Status: SHIPPED | OUTPATIENT
Start: 2018-10-18 | End: 2018-12-19

## 2018-10-23 ENCOUNTER — TELEPHONE (OUTPATIENT)
Dept: CARDIOLOGY | Facility: MEDICAL CENTER | Age: 73
End: 2018-10-23

## 2018-10-24 NOTE — TELEPHONE ENCOUNTER
"RE: Question about lisinopril   Received: Today   Message Contents   ANDREY Gibson R.N.   Phone Number: [phone]             Ok to take if no side effect of cough or kidney problem.     James Davila M.D.    Previous Messages      ----- Message -----   From: Chelsie Baeza R.N.   Sent: 10/23/2018   4:39 PM   To: James Davila M.D.   Subject: FW: Question about lisinopril                         ----- Message -----   From: Mauricio Nguyen   Sent: 10/23/2018   2:52 PM   To: Chelsie Baeza R.N.   Subject: Question about lisinopril                         TT/Chelsie     Pt states his primary care discontinued lisinopril a couple months ago. States he just noticed he got a refill from Dr. Davila and wants to know if he should resume taking medication. Please call pt to advise at [phone].         Returned patient call. Pt informed above. Pt states \"so does he want me to take it or not\"? \"Pt states I have COPD, I always cough\". Explained that if his cough is not worse than normal while on the medication. Pt states \"Oh, Ok\". Pt states he will see his PCP on Saturday and will tell him he is back on the medication now.   "

## 2018-10-29 ENCOUNTER — TELEPHONE (OUTPATIENT)
Dept: CARDIOLOGY | Facility: MEDICAL CENTER | Age: 73
End: 2018-10-29

## 2018-10-29 NOTE — TELEPHONE ENCOUNTER
Question about Plavix   Received: Today   Message Contents   Cyril Ko Ass't  Chelsie Baeza RJOHANNA   Phone Number: [phone]             Patient called he is having cataract surgery tomorrow and is concerned about his use of Plavix and if this will interfere with anything in regards to his procedure. Patient asked for a call back.     Thank you,   Amy [phone]      Returned patient call. Pt asked if he made eye surgeons office aware of his use of this medication. He states he doesn't know if they know or not. Pt informed this would be the first step, to inform them and find out from their office if there is any recommendations or  prior to procedure to stop medication. Pt reluctant but agreeable after further discussion.

## 2018-11-10 DIAGNOSIS — E78.5 HYPERLIPIDEMIA, UNSPECIFIED HYPERLIPIDEMIA TYPE: Primary | ICD-10-CM

## 2018-11-12 RX ORDER — ATORVASTATIN CALCIUM 80 MG/1
80 TABLET, FILM COATED ORAL DAILY
Qty: 90 TAB | Refills: 2 | Status: SHIPPED | OUTPATIENT
Start: 2018-11-12 | End: 2019-06-26

## 2018-12-10 DIAGNOSIS — I25.5 ISCHEMIC CARDIOMYOPATHY: ICD-10-CM

## 2018-12-10 DIAGNOSIS — I50.9 HEART FAILURE, NYHA CLASS 2 (HCC): ICD-10-CM

## 2018-12-10 DIAGNOSIS — I50.20 ACC/AHA STAGE C SYSTOLIC HEART FAILURE (HCC): ICD-10-CM

## 2018-12-10 RX ORDER — SPIRONOLACTONE 25 MG/1
TABLET ORAL
Qty: 90 TAB | Refills: 2 | Status: SHIPPED | OUTPATIENT
Start: 2018-12-10 | End: 2019-03-08 | Stop reason: SDUPTHER

## 2018-12-19 ENCOUNTER — OFFICE VISIT (OUTPATIENT)
Dept: CARDIOLOGY | Facility: MEDICAL CENTER | Age: 73
End: 2018-12-19
Payer: MEDICARE

## 2018-12-19 VITALS
HEIGHT: 69 IN | OXYGEN SATURATION: 98 % | BODY MASS INDEX: 19.11 KG/M2 | DIASTOLIC BLOOD PRESSURE: 50 MMHG | WEIGHT: 129 LBS | SYSTOLIC BLOOD PRESSURE: 84 MMHG | HEART RATE: 88 BPM

## 2018-12-19 DIAGNOSIS — I50.20 ACC/AHA STAGE C SYSTOLIC HEART FAILURE (HCC): ICD-10-CM

## 2018-12-19 DIAGNOSIS — I50.9 HEART FAILURE, NYHA CLASS 2 (HCC): ICD-10-CM

## 2018-12-19 DIAGNOSIS — Z95.5 STENTED CORONARY ARTERY: ICD-10-CM

## 2018-12-19 DIAGNOSIS — I49.1 PREMATURE ATRIAL COMPLEXES: ICD-10-CM

## 2018-12-19 DIAGNOSIS — Z78.9 ALCOHOL USE: ICD-10-CM

## 2018-12-19 DIAGNOSIS — Z72.0 TOBACCO ABUSE: ICD-10-CM

## 2018-12-19 DIAGNOSIS — E78.2 MIXED HYPERLIPIDEMIA: ICD-10-CM

## 2018-12-19 DIAGNOSIS — I25.10 CORONARY ARTERY DISEASE INVOLVING NATIVE CORONARY ARTERY OF NATIVE HEART WITHOUT ANGINA PECTORIS: ICD-10-CM

## 2018-12-19 DIAGNOSIS — I25.5 ISCHEMIC CARDIOMYOPATHY: ICD-10-CM

## 2018-12-19 DIAGNOSIS — Z79.899 HIGH RISK MEDICATION USE: ICD-10-CM

## 2018-12-19 PROCEDURE — 99214 OFFICE O/P EST MOD 30 MIN: CPT | Performed by: INTERNAL MEDICINE

## 2018-12-19 RX ORDER — SODIUM CHLORIDE 20 MG/ML
SOLUTION OPHTHALMIC
Refills: 0 | COMMUNITY
Start: 2018-11-27 | End: 2019-06-26

## 2018-12-19 RX ORDER — PREDNISOLONE ACETATE 10 MG/ML
SUSPENSION/ DROPS OPHTHALMIC
Refills: 1 | COMMUNITY
Start: 2018-10-29 | End: 2019-06-26

## 2018-12-19 ASSESSMENT — ENCOUNTER SYMPTOMS
DOUBLE VISION: 0
NAUSEA: 0
LOSS OF CONSCIOUSNESS: 0
PND: 0
ORTHOPNEA: 0
SPEECH CHANGE: 0
PALPITATIONS: 0
BRUISES/BLEEDS EASILY: 0
EYE PAIN: 0
BLURRED VISION: 0
HALLUCINATIONS: 0
EYE DISCHARGE: 0
CHILLS: 0
SHORTNESS OF BREATH: 0
CLAUDICATION: 0
MYALGIAS: 0
WEIGHT LOSS: 0
HEADACHES: 0
FALLS: 0
DEPRESSION: 0
SENSORY CHANGE: 0
DIZZINESS: 0
VOMITING: 0
BLOOD IN STOOL: 0
COUGH: 0
FEVER: 0
ABDOMINAL PAIN: 0

## 2018-12-19 ASSESSMENT — MINNESOTA LIVING WITH HEART FAILURE QUESTIONNAIRE (MLHF)
DIFFICULTY SLEEPING WELL AT NIGHT: 0
MAKING YOU SHORT OF BREATH: 5
EATING LESS FOODS YOU LIKE: 3
DIFFICULTY TO CONCENTRATE OR REMEMBERING THINGS: 1
DIFFICULTY GOING AWAY FROM HOME: 5
WORKING AROUND THE HOUSE OR YARD DIFFICULT: 5
SWELLING IN ANKLES OR LEGS: 0
MAKING YOU STAY IN A HOSPITAL: 0
COSTING YOU MONEY FOR MEDICAL CARE: 4
DIFFICULTY WITH SEXUAL ACTIVITIES: 3
TIRED, FATIGUED OR LOW ON ENERGY: 5
HAVING TO SIT OR LIE DOWN DURING THE DAY: 5
WALKING ABOUT OR CLIMBING STAIRS DIFFICULT: 5
DIFFICULTY SOCIALIZING WITH FAMILY OR FRIENDS: 5
FEELING LIKE A BURDEN TO FAMILY AND FRIENDS: 4
DIFFICULTY WORKING TO EARN A LIVING: 5
MAKING YOU WORRY: 4
GIVING YOU SIDE EFFECTS FROM TREATMENTS: 3
MAKING YOU FEEL DEPRESSED: 4
DIFFICULTY WITH RECREATIONAL PASTIMES, SPORTS, HOBBIES: 5
LOSS OF SELF CONTROL IN YOUR LIFE: 5
TOTAL_SCORE: 76

## 2018-12-19 NOTE — LETTER
[facility]   [address]  [place] [address]  Phone: [phone]  Fax: [phone]              Khai Munoz  1945    Encounter Date: 12/19/2018    James Davila M.D.          PROGRESS NOTE:  Chief Complaint   Patient presents with   • CHF (Systolic)     F/V: 3 MO       Subjective:   Khai Munoz is a 72 y.o. male who presents today for cardiac care after his recent hospitalization due to non-ST elevation myocardial infarction episode. Patient underwent PCI and stenting of his proximal LAD. He also has a history of peripheral vascular disease for which he underwent AAA repair in February 2017. Patient is doing okay right now. No chest pain. He does get winded with daily living activities. His left ventricular systolic function dropped to 35% after his myocardial infarction episode. He is currently still drinking and smoking.     Patient still gets winded with daily living activities and exertion. No symptoms at rest.     No clinical change since last visit.      Stopped ETOH 9 weeks ago.     Still smoking.    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HCC)    • Tinnitus    • Urinary bladder disorder     history of UTI     Past Surgical History:   Procedure Laterality Date   • ANGIOPLASTY  09/2017   • AAA WITH STENT GRAFT  2/15/2017    Procedure: AAA WITH STENT GRAFT FOR: ENDOVASCULAR REPAIR OF INFRARENAL ABDOMINAL AORTIC ANEURYSM USING TWO DOCKING LIMBS;  Surgeon: Calvin Paulino M.D.;  Location: SURGERY [facility];  Service:    • CYST EXCISION      scrotal   • OTHER      brachy therapy and radiation  for prostate ca   • OTHER      dental implants     Family History   Problem Relation Age of Onset   • Heart Disease Mother    • GI Sister    • Diabetes Sister      Social History     Social History   • Marital status:      Spouse name: N/A   • Number of children: N/A   • Years of education: N/A     Occupational History   • Not on file.     Social History Main Topics   • Smoking status: Current Every Day Smoker     Packs/day: 0.25     Years: 50.00     Types: Cigarettes   • Smokeless tobacco: Never Used   • Alcohol use 3.6 - 4.8 oz/week     6 - 8 Shots of liquor per week      Comment: 375ml per day fo grand [place]   • Drug use: Yes      Comment: daily marijuana   • Sexual activity: Not on file     Other Topics Concern   • Not on file     Social History Narrative   • No narrative on file     Allergies   Allergen Reactions   • Declomycin      Reaction a long time ago     Outpatient Encounter Prescriptions as of 12/19/2018   Medication Sig Dispense Refill   • prednisoLONE acetate (PRED FORTE) 1 % Suspension 1 DROP 4 TIMES A DAY X1WEEK, 3 TIMES A DAY WEEK 2, TWICE A DAY WEEK 3, DAILY WEEK 4  1   • ALEXEI 128 2 % ophthalmic solution INSTILL 1 DROP INTO LEFT EYE 4 TIMES A DAY  0   • spironolactone (ALDACTONE) 25 MG Tab TAKE 1 TABLET BY MOUTH EVERY DAY 90 Tab 2   • atorvastatin (LIPITOR) 80 MG tablet TAKE 1 TAB BY MOUTH EVERY DAY. 90 Tab 2   • mesalamine (LIALDA) 1.2 GM Tablet Delayed Response Take 1.2 g by mouth every morning with breakfast.     • Probiotic Product (PROBIOTIC-10 PO) Take  by mouth.     • carvedilol (COREG) 6.25 MG Tab Take 1 Tab by mouth 2 times a day, with meals. 180 Tab 3   • clopidogrel (PLAVIX) 75 MG Tab Take 1 Tab by mouth every day. 90 Tab 3   • ipratropium (ATROVENT HFA) 17 MCG/ACT Aero Soln Inhale 2 Puffs by mouth every 6 hours.     • buPROPion (WELLBUTRIN XL) 150 MG XL tablet Take 150 mg by mouth every day.     • thiamine (THIAMINE) 100 MG tablet Take 1 Tab by mouth every day. 90 Tab 3   •  "tamsulosin (FLOMAX) 0.4 MG capsule Take 0.8 mg by mouth every bedtime.     • ADVAIR DISKUS 250-50 MCG/DOSE AEROSOL POWDER, BREATH ACTIVATED Take 1 Puff by mouth 2 Times a Day.  0   • pyridoxine 100 MG tablet Take 100 mg by mouth every day. Vitamin B-6     • aspirin 81 MG tablet Take 81 mg by mouth every day.     • [DISCONTINUED] lisinopril (PRINIVIL) 5 MG Tab TAKE 1 TAB BY MOUTH EVERY DAY. (Patient not taking: Reported on 12/19/2018) 90 Tab 3   • [DISCONTINUED] budesonide (ENTOCORT EC) 3 MG Cap DR Particles capsule Take 3 mg by mouth every morning.     • fluticasone (FLONASE) 50 MCG/ACT nasal spray PLACE 1 SPRAY INTO THE NOSTRIL(S) 2 (TWO) TIMES DAILY  2     No facility-administered encounter medications on file as of 12/19/2018.      Review of Systems   Constitutional: Negative for chills, fever, malaise/fatigue and weight loss.   HENT: Negative for ear discharge, ear pain, hearing loss and nosebleeds.    Eyes: Negative for blurred vision, double vision, pain and discharge.   Respiratory: Negative for cough and shortness of breath.    Cardiovascular: Negative for chest pain, palpitations, orthopnea, claudication, leg swelling and PND.   Gastrointestinal: Negative for abdominal pain, blood in stool, melena, nausea and vomiting.   Genitourinary: Negative for dysuria and hematuria.   Musculoskeletal: Negative for falls, joint pain and myalgias.   Skin: Negative for itching and rash.   Neurological: Negative for dizziness, sensory change, speech change, loss of consciousness and headaches.   Endo/Heme/Allergies: Negative for environmental allergies. Does not bruise/bleed easily.   Psychiatric/Behavioral: Negative for depression, hallucinations and suicidal ideas.        Objective:   BP (!) 84/50 (BP Location: Right arm, Patient Position: Sitting, BP Cuff Size: Adult)   Pulse 88   Ht 1.753 m (5' 9\")   Wt 58.5 kg (129 lb)   SpO2 98%   BMI 19.05 kg/m²      Physical Exam   Constitutional: He is oriented to person, " place, and time. No distress.   HENT:   Head: Normocephalic and atraumatic.   Right Ear: External ear normal.   Left Ear: External ear normal.   Eyes: Right eye exhibits no discharge. Left eye exhibits no discharge.   Neck: No JVD present. No thyromegaly present.   Cardiovascular: Normal rate, regular rhythm, normal heart sounds and intact distal pulses.  Exam reveals no gallop and no friction rub.    No murmur heard.  Pulmonary/Chest: Breath sounds normal. No respiratory distress.   Abdominal: Bowel sounds are normal. He exhibits no distension. There is no tenderness.   Musculoskeletal: He exhibits no edema or tenderness.   Neurological: He is alert and oriented to person, place, and time. No cranial nerve deficit.   Skin: Skin is warm and dry. He is not diaphoretic. There is pallor.   Psychiatric: He has a normal mood and affect. His behavior is normal.   Nursing note and vitals reviewed.      Assessment:     1. ACC/AHA stage C systolic heart failure (HCC)     2. Heart failure, NYHA class 2 (HCC)     3. Ischemic cardiomyopathy     4. Stented coronary artery     5. Coronary artery disease involving native coronary artery of native heart without angina pectoris     6. Mixed hyperlipidemia     7. Alcohol use     8. Tobacco abuse     9. Premature atrial complexes     10. High risk medication use         Medical Decision Making:  Today's Assessment / Status / Plan:   Workup for anemia with primary care doctor.  Continue current medication without change.  Continue with holding from alcohol.      Baldo Fajardo M.D.  [address]  [facility] [address]  VIA Facsimile: [phone]

## 2018-12-19 NOTE — PROGRESS NOTES
Chief Complaint   Patient presents with   • CHF (Systolic)     F/V: 3 MO       Subjective:   Khai Munoz is a 72 y.o. male who presents today for cardiac care after his recent hospitalization due to non-ST elevation myocardial infarction episode. Patient underwent PCI and stenting of his proximal LAD. He also has a history of peripheral vascular disease for which he underwent AAA repair in February 2017. Patient is doing okay right now. No chest pain. He does get winded with daily living activities. His left ventricular systolic function dropped to 35% after his myocardial infarction episode. He is currently still drinking and smoking.     Patient still gets winded with daily living activities and exertion. No symptoms at rest.     No clinical change since last visit.      Stopped ETOH 9 weeks ago.     Still smoking.    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HCC)    • Tinnitus    • Urinary bladder disorder     history of UTI     Past Surgical History:   Procedure Laterality Date   • ANGIOPLASTY  09/2017   • AAA WITH STENT GRAFT  2/15/2017    Procedure: AAA WITH STENT GRAFT FOR: ENDOVASCULAR REPAIR OF INFRARENAL ABDOMINAL AORTIC ANEURYSM USING TWO DOCKING LIMBS;  Surgeon: Calvin Paulino M.D.;  Location: SURGERY [facility];  Service:    • CYST EXCISION      scrotal   • OTHER      brachy therapy and radiation for prostate ca   • OTHER      dental implants     Family History   Problem Relation Age of Onset   • Heart Disease Mother    • GI Sister    • Diabetes Sister      Social History     Social History   • Marital status:      Spouse name: N/A   • Number of children:  N/A   • Years of education: N/A     Occupational History   • Not on file.     Social History Main Topics   • Smoking status: Current Every Day Smoker     Packs/day: 0.25     Years: 50.00     Types: Cigarettes   • Smokeless tobacco: Never Used   • Alcohol use 3.6 - 4.8 oz/week     6 - 8 Shots of liquor per week      Comment: 375ml per day fo [place]   • Drug use: Yes      Comment: daily marijuana   • Sexual activity: Not on file     Other Topics Concern   • Not on file     Social History Narrative   • No narrative on file     Allergies   Allergen Reactions   • Declomycin      Reaction a long time ago     Outpatient Encounter Prescriptions as of 12/19/2018   Medication Sig Dispense Refill   • prednisoLONE acetate (PRED FORTE) 1 % Suspension 1 DROP 4 TIMES A DAY X1WEEK, 3 TIMES A DAY WEEK 2, TWICE A DAY WEEK 3, DAILY WEEK 4  1   • ALEXEI 128 2 % ophthalmic solution INSTILL 1 DROP INTO LEFT EYE 4 TIMES A DAY  0   • spironolactone (ALDACTONE) 25 MG Tab TAKE 1 TABLET BY MOUTH EVERY DAY 90 Tab 2   • atorvastatin (LIPITOR) 80 MG tablet TAKE 1 TAB BY MOUTH EVERY DAY. 90 Tab 2   • mesalamine (LIALDA) 1.2 GM Tablet Delayed Response Take 1.2 g by mouth every morning with breakfast.     • Probiotic Product (PROBIOTIC-10 PO) Take  by mouth.     • carvedilol (COREG) 6.25 MG Tab Take 1 Tab by mouth 2 times a day, with meals. 180 Tab 3   • clopidogrel (PLAVIX) 75 MG Tab Take 1 Tab by mouth every day. 90 Tab 3   • ipratropium (ATROVENT HFA) 17 MCG/ACT Aero Soln Inhale 2 Puffs by mouth every 6 hours.     • buPROPion (WELLBUTRIN XL) 150 MG XL tablet Take 150 mg by mouth every day.     • thiamine (THIAMINE) 100 MG tablet Take 1 Tab by mouth every day. 90 Tab 3   • tamsulosin (FLOMAX) 0.4 MG capsule Take 0.8 mg by mouth every bedtime.     • ADVAIR DISKUS 250-50 MCG/DOSE AEROSOL POWDER, BREATH ACTIVATED Take 1 Puff by mouth 2 Times a Day.  0   • pyridoxine 100 MG tablet Take 100 mg by mouth every day. Vitamin B-6     • aspirin 81 MG  "tablet Take 81 mg by mouth every day.     • [DISCONTINUED] lisinopril (PRINIVIL) 5 MG Tab TAKE 1 TAB BY MOUTH EVERY DAY. (Patient not taking: Reported on 12/19/2018) 90 Tab 3   • [DISCONTINUED] budesonide (ENTOCORT EC) 3 MG Cap DR Particles capsule Take 3 mg by mouth every morning.     • fluticasone (FLONASE) 50 MCG/ACT nasal spray PLACE 1 SPRAY INTO THE NOSTRIL(S) 2 (TWO) TIMES DAILY  2     No facility-administered encounter medications on file as of 12/19/2018.      Review of Systems   Constitutional: Negative for chills, fever, malaise/fatigue and weight loss.   HENT: Negative for ear discharge, ear pain, hearing loss and nosebleeds.    Eyes: Negative for blurred vision, double vision, pain and discharge.   Respiratory: Negative for cough and shortness of breath.    Cardiovascular: Negative for chest pain, palpitations, orthopnea, claudication, leg swelling and PND.   Gastrointestinal: Negative for abdominal pain, blood in stool, melena, nausea and vomiting.   Genitourinary: Negative for dysuria and hematuria.   Musculoskeletal: Negative for falls, joint pain and myalgias.   Skin: Negative for itching and rash.   Neurological: Negative for dizziness, sensory change, speech change, loss of consciousness and headaches.   Endo/Heme/Allergies: Negative for environmental allergies. Does not bruise/bleed easily.   Psychiatric/Behavioral: Negative for depression, hallucinations and suicidal ideas.        Objective:   BP (!) 84/50 (BP Location: Right arm, Patient Position: Sitting, BP Cuff Size: Adult)   Pulse 88   Ht 1.753 m (5' 9\")   Wt 58.5 kg (129 lb)   SpO2 98%   BMI 19.05 kg/m²     Physical Exam   Constitutional: He is oriented to person, place, and time. No distress.   HENT:   Head: Normocephalic and atraumatic.   Right Ear: External ear normal.   Left Ear: External ear normal.   Eyes: Right eye exhibits no discharge. Left eye exhibits no discharge.   Neck: No JVD present. No thyromegaly present. "   Cardiovascular: Normal rate, regular rhythm, normal heart sounds and intact distal pulses.  Exam reveals no gallop and no friction rub.    No murmur heard.  Pulmonary/Chest: Breath sounds normal. No respiratory distress.   Abdominal: Bowel sounds are normal. He exhibits no distension. There is no tenderness.   Musculoskeletal: He exhibits no edema or tenderness.   Neurological: He is alert and oriented to person, place, and time. No cranial nerve deficit.   Skin: Skin is warm and dry. He is not diaphoretic. There is pallor.   Psychiatric: He has a normal mood and affect. His behavior is normal.   Nursing note and vitals reviewed.      Assessment:     1. ACC/AHA stage C systolic heart failure (HCC)     2. Heart failure, NYHA class 2 (HCC)     3. Ischemic cardiomyopathy     4. Stented coronary artery     5. Coronary artery disease involving native coronary artery of native heart without angina pectoris     6. Mixed hyperlipidemia     7. Alcohol use     8. Tobacco abuse     9. Premature atrial complexes     10. High risk medication use         Medical Decision Making:  Today's Assessment / Status / Plan:   Workup for anemia with primary care doctor.  Continue current medication without change.  Continue with holding from alcohol.

## 2019-01-18 ENCOUNTER — TELEPHONE (OUTPATIENT)
Dept: CARDIOLOGY | Facility: MEDICAL CENTER | Age: 74
End: 2019-01-18

## 2019-01-18 NOTE — TELEPHONE ENCOUNTER
doctor to doctor   Received: Today   Message Contents   Pilar Baeza R.N.   Phone Number: [phone]             TT/mary beth     Dr Debbie Horne calling to discuss pt with TT.       Pt tells Dr Horne he has constant GI bleed, she wishes to discuss stopping plavix.  Pt is admitted at this time to [facility].     Please ask TT to call Dr Horne at [phone].      MD notified.

## 2019-03-05 DIAGNOSIS — I25.5 ISCHEMIC CARDIOMYOPATHY: ICD-10-CM

## 2019-03-05 DIAGNOSIS — I50.20 ACC/AHA STAGE C SYSTOLIC HEART FAILURE (HCC): ICD-10-CM

## 2019-03-05 DIAGNOSIS — I50.9 HEART FAILURE, NYHA CLASS 2 (HCC): ICD-10-CM

## 2019-03-05 RX ORDER — CARVEDILOL 6.25 MG/1
6.25 TABLET ORAL 2 TIMES DAILY WITH MEALS
Qty: 180 TAB | Refills: 3 | Status: SHIPPED | OUTPATIENT
Start: 2019-03-05 | End: 2019-06-26

## 2019-03-08 DIAGNOSIS — I25.5 ISCHEMIC CARDIOMYOPATHY: ICD-10-CM

## 2019-03-08 DIAGNOSIS — I50.20 ACC/AHA STAGE C SYSTOLIC HEART FAILURE (HCC): ICD-10-CM

## 2019-03-08 DIAGNOSIS — I50.9 HEART FAILURE, NYHA CLASS 2 (HCC): ICD-10-CM

## 2019-03-08 RX ORDER — SPIRONOLACTONE 25 MG/1
25 TABLET ORAL
Qty: 90 TAB | Refills: 3 | Status: SHIPPED | OUTPATIENT
Start: 2019-03-08 | End: 2019-06-26

## 2019-06-26 ENCOUNTER — HOSPITAL ENCOUNTER (OUTPATIENT)
Dept: RADIOLOGY | Facility: MEDICAL CENTER | Age: 74
End: 2019-06-26

## 2019-06-26 ENCOUNTER — APPOINTMENT (OUTPATIENT)
Dept: RADIOLOGY | Facility: MEDICAL CENTER | Age: 74
DRG: 654 | End: 2019-06-26
Attending: EMERGENCY MEDICINE
Payer: MEDICARE

## 2019-06-26 ENCOUNTER — HOSPITAL ENCOUNTER (INPATIENT)
Facility: MEDICAL CENTER | Age: 74
LOS: 3 days | DRG: 654 | End: 2019-06-29
Attending: EMERGENCY MEDICINE | Admitting: HOSPITALIST
Payer: MEDICARE

## 2019-06-26 LAB
ABO GROUP BLD: ABNORMAL
ALBUMIN SERPL BCP-MCNC: 3.4 G/DL (ref 3.2–4.9)
ALBUMIN/GLOB SERPL: 1 G/DL
ALP SERPL-CCNC: 22 U/L (ref 30–99)
ALT SERPL-CCNC: 15 U/L (ref 2–50)
ANION GAP SERPL CALC-SCNC: 13 MMOL/L (ref 0–11.9)
APTT PPP: 37.2 SEC (ref 24.7–36)
AST SERPL-CCNC: 21 U/L (ref 12–45)
BARCODED ABORH UBTYP: 5100
BARCODED PRD CODE UBPRD: ABNORMAL
BARCODED UNIT NUM UBUNT: ABNORMAL
BASOPHILS # BLD AUTO: 0.3 % (ref 0–1.8)
BASOPHILS # BLD: 0.04 K/UL (ref 0–0.12)
BILIRUB SERPL-MCNC: 0.3 MG/DL (ref 0.1–1.5)
BLD GP AB INVEST PLASRBC-IMP: ABNORMAL
BLD GP AB INVEST PLASRBC-IMP: ABNORMAL
BLD GP AB SCN SERPL QL: ABNORMAL
BUN SERPL-MCNC: 56 MG/DL (ref 8–22)
CALCIUM SERPL-MCNC: 8.8 MG/DL (ref 8.5–10.5)
CHLORIDE SERPL-SCNC: 105 MMOL/L (ref 96–112)
CO2 SERPL-SCNC: 17 MMOL/L (ref 20–33)
COMPONENT R 8504R: ABNORMAL
CREAT SERPL-MCNC: 2.64 MG/DL (ref 0.5–1.4)
EOSINOPHIL # BLD AUTO: 0.06 K/UL (ref 0–0.51)
EOSINOPHIL NFR BLD: 0.4 % (ref 0–6.9)
ERYTHROCYTE [DISTWIDTH] IN BLOOD BY AUTOMATED COUNT: 58.3 FL (ref 35.9–50)
GLOBULIN SER CALC-MCNC: 3.4 G/DL (ref 1.9–3.5)
GLUCOSE BLD-MCNC: 181 MG/DL (ref 65–99)
GLUCOSE SERPL-MCNC: 110 MG/DL (ref 65–99)
HCT VFR BLD AUTO: 27.4 % (ref 42–52)
HGB BLD-MCNC: 8.4 G/DL (ref 14–18)
IMM GRANULOCYTES # BLD AUTO: 0.11 K/UL (ref 0–0.11)
IMM GRANULOCYTES NFR BLD AUTO: 0.8 % (ref 0–0.9)
INR PPP: 1.33 (ref 0.87–1.13)
KELL GROUP AG RBC: NORMAL
LYMPHOCYTES # BLD AUTO: 1.8 K/UL (ref 1–4.8)
LYMPHOCYTES NFR BLD: 12.4 % (ref 22–41)
MAGNESIUM SERPL-MCNC: 2.3 MG/DL (ref 1.5–2.5)
MCH RBC QN AUTO: 31.2 PG (ref 27–33)
MCHC RBC AUTO-ENTMCNC: 30.7 G/DL (ref 33.7–35.3)
MCV RBC AUTO: 101.9 FL (ref 81.4–97.8)
MONOCYTES # BLD AUTO: 0.75 K/UL (ref 0–0.85)
MONOCYTES NFR BLD AUTO: 5.2 % (ref 0–13.4)
NEUTROPHILS # BLD AUTO: 11.71 K/UL (ref 1.82–7.42)
NEUTROPHILS NFR BLD: 80.9 % (ref 44–72)
NRBC # BLD AUTO: 0 K/UL
NRBC BLD-RTO: 0 /100 WBC
PLATELET # BLD AUTO: 353 K/UL (ref 164–446)
PMV BLD AUTO: 8.7 FL (ref 9–12.9)
POTASSIUM SERPL-SCNC: 6.2 MMOL/L (ref 3.6–5.5)
PRODUCT TYPE UPROD: ABNORMAL
PROT SERPL-MCNC: 6.8 G/DL (ref 6–8.2)
PROTHROMBIN TIME: 16.8 SEC (ref 12–14.6)
RBC # BLD AUTO: 2.69 M/UL (ref 4.7–6.1)
RH BLD: ABNORMAL
SODIUM SERPL-SCNC: 135 MMOL/L (ref 135–145)
UNIT STATUS USTAT: ABNORMAL
WBC # BLD AUTO: 14.5 K/UL (ref 4.8–10.8)

## 2019-06-26 PROCEDURE — 82962 GLUCOSE BLOOD TEST: CPT

## 2019-06-26 PROCEDURE — 96375 TX/PRO/DX INJ NEW DRUG ADDON: CPT

## 2019-06-26 PROCEDURE — 99285 EMERGENCY DEPT VISIT HI MDM: CPT

## 2019-06-26 PROCEDURE — 85025 COMPLETE CBC W/AUTO DIFF WBC: CPT

## 2019-06-26 PROCEDURE — 96376 TX/PRO/DX INJ SAME DRUG ADON: CPT

## 2019-06-26 PROCEDURE — 302140 GU CART: Performed by: EMERGENCY MEDICINE

## 2019-06-26 PROCEDURE — 700102 HCHG RX REV CODE 250 W/ 637 OVERRIDE(OP): Performed by: EMERGENCY MEDICINE

## 2019-06-26 PROCEDURE — 86870 RBC ANTIBODY IDENTIFICATION: CPT

## 2019-06-26 PROCEDURE — 99223 1ST HOSP IP/OBS HIGH 75: CPT | Mod: GC | Performed by: HOSPITALIST

## 2019-06-26 PROCEDURE — 80053 COMPREHEN METABOLIC PANEL: CPT

## 2019-06-26 PROCEDURE — 86900 BLOOD TYPING SEROLOGIC ABO: CPT

## 2019-06-26 PROCEDURE — 304561 HCHG STAT O2

## 2019-06-26 PROCEDURE — 700105 HCHG RX REV CODE 258: Performed by: EMERGENCY MEDICINE

## 2019-06-26 PROCEDURE — 96372 THER/PROPH/DIAG INJ SC/IM: CPT

## 2019-06-26 PROCEDURE — 86922 COMPATIBILITY TEST ANTIGLOB: CPT

## 2019-06-26 PROCEDURE — 85730 THROMBOPLASTIN TIME PARTIAL: CPT

## 2019-06-26 PROCEDURE — 36415 COLL VENOUS BLD VENIPUNCTURE: CPT

## 2019-06-26 PROCEDURE — 93005 ELECTROCARDIOGRAM TRACING: CPT | Performed by: EMERGENCY MEDICINE

## 2019-06-26 PROCEDURE — 86850 RBC ANTIBODY SCREEN: CPT

## 2019-06-26 PROCEDURE — 86901 BLOOD TYPING SEROLOGIC RH(D): CPT

## 2019-06-26 PROCEDURE — 83735 ASSAY OF MAGNESIUM: CPT

## 2019-06-26 PROCEDURE — 96374 THER/PROPH/DIAG INJ IV PUSH: CPT

## 2019-06-26 PROCEDURE — 74176 CT ABD & PELVIS W/O CONTRAST: CPT

## 2019-06-26 PROCEDURE — 86905 BLOOD TYPING RBC ANTIGENS: CPT

## 2019-06-26 PROCEDURE — 86902 BLOOD TYPE ANTIGEN DONOR EA: CPT | Mod: 91

## 2019-06-26 PROCEDURE — 700111 HCHG RX REV CODE 636 W/ 250 OVERRIDE (IP): Performed by: EMERGENCY MEDICINE

## 2019-06-26 PROCEDURE — 85610 PROTHROMBIN TIME: CPT

## 2019-06-26 PROCEDURE — 770020 HCHG ROOM/CARE - TELE (206)

## 2019-06-26 RX ORDER — LABETALOL HYDROCHLORIDE 5 MG/ML
10 INJECTION, SOLUTION INTRAVENOUS EVERY 4 HOURS PRN
Status: DISCONTINUED | OUTPATIENT
Start: 2019-06-26 | End: 2019-06-27

## 2019-06-26 RX ORDER — MESALAMINE 1.2 G/1
1.2 TABLET, DELAYED RELEASE ORAL
COMMUNITY

## 2019-06-26 RX ORDER — HYDROCODONE BITARTRATE AND ACETAMINOPHEN 5; 325 MG/1; MG/1
1 TABLET ORAL EVERY 6 HOURS PRN
COMMUNITY

## 2019-06-26 RX ORDER — AMIODARONE HYDROCHLORIDE 200 MG/1
200 TABLET ORAL DAILY
COMMUNITY

## 2019-06-26 RX ORDER — SODIUM CHLORIDE 9 MG/ML
1000 INJECTION, SOLUTION INTRAVENOUS ONCE
Status: COMPLETED | OUTPATIENT
Start: 2019-06-26 | End: 2019-06-26

## 2019-06-26 RX ORDER — PHENAZOPYRIDINE HYDROCHLORIDE 100 MG/1
100 TABLET, FILM COATED ORAL 3 TIMES DAILY PRN
COMMUNITY

## 2019-06-26 RX ORDER — ACETAMINOPHEN 500 MG
500 TABLET ORAL EVERY 6 HOURS PRN
COMMUNITY

## 2019-06-26 RX ORDER — CARVEDILOL 6.25 MG/1
6.25 TABLET ORAL 2 TIMES DAILY WITH MEALS
COMMUNITY

## 2019-06-26 RX ORDER — FERROUS SULFATE 325(65) MG
325 TABLET ORAL DAILY
COMMUNITY

## 2019-06-26 RX ORDER — ATORVASTATIN CALCIUM 80 MG/1
80 TABLET, FILM COATED ORAL EVERY EVENING
COMMUNITY

## 2019-06-26 RX ORDER — THIAMINE MONONITRATE (VIT B1) 100 MG
100 TABLET ORAL DAILY
COMMUNITY

## 2019-06-26 RX ORDER — SPIRONOLACTONE 25 MG/1
25 TABLET ORAL DAILY
COMMUNITY

## 2019-06-26 RX ORDER — FINASTERIDE 5 MG/1
5 TABLET, FILM COATED ORAL EVERY EVENING
COMMUNITY

## 2019-06-26 RX ORDER — ACETAMINOPHEN 325 MG/1
650 TABLET ORAL EVERY 6 HOURS PRN
Status: DISCONTINUED | OUTPATIENT
Start: 2019-06-26 | End: 2019-06-29 | Stop reason: HOSPADM

## 2019-06-26 RX ORDER — POLYETHYLENE GLYCOL 3350 17 G/17G
1 POWDER, FOR SOLUTION ORAL DAILY
Status: DISCONTINUED | OUTPATIENT
Start: 2019-06-27 | End: 2019-06-29 | Stop reason: HOSPADM

## 2019-06-26 RX ORDER — BUPROPION HYDROCHLORIDE 150 MG/1
150 TABLET, EXTENDED RELEASE ORAL DAILY
COMMUNITY

## 2019-06-26 RX ORDER — CALCIUM GLUCONATE 94 MG/ML
1 INJECTION, SOLUTION INTRAVENOUS ONCE
Status: COMPLETED | OUTPATIENT
Start: 2019-06-26 | End: 2019-06-26

## 2019-06-26 RX ORDER — FUROSEMIDE 40 MG/1
40 TABLET ORAL DAILY
COMMUNITY

## 2019-06-26 RX ORDER — TAMSULOSIN HYDROCHLORIDE 0.4 MG/1
0.8 CAPSULE ORAL EVERY EVENING
COMMUNITY

## 2019-06-26 RX ORDER — LISINOPRIL 5 MG/1
5 TABLET ORAL DAILY
COMMUNITY

## 2019-06-26 RX ORDER — SODIUM POLYSTYRENE SULFONATE 15 G/60ML
15 SUSPENSION ORAL; RECTAL ONCE
Status: COMPLETED | OUTPATIENT
Start: 2019-06-26 | End: 2019-06-27

## 2019-06-26 RX ORDER — HYDROMORPHONE HYDROCHLORIDE 1 MG/ML
0.5 INJECTION, SOLUTION INTRAMUSCULAR; INTRAVENOUS; SUBCUTANEOUS
Status: DISCONTINUED | OUTPATIENT
Start: 2019-06-26 | End: 2019-06-27

## 2019-06-26 RX ORDER — OXYCODONE HYDROCHLORIDE AND ACETAMINOPHEN 5; 325 MG/1; MG/1
1 TABLET ORAL EVERY 4 HOURS PRN
Status: DISCONTINUED | OUTPATIENT
Start: 2019-06-26 | End: 2019-06-29 | Stop reason: HOSPADM

## 2019-06-26 RX ORDER — POTASSIUM CHLORIDE 20 MEQ/1
20 TABLET, EXTENDED RELEASE ORAL DAILY
Status: ON HOLD | COMMUNITY
End: 2019-06-29

## 2019-06-26 RX ADMIN — FENTANYL CITRATE 50 MCG: 50 INJECTION INTRAMUSCULAR; INTRAVENOUS at 20:37

## 2019-06-26 RX ADMIN — DEXTROSE MONOHYDRATE 250 ML: 100 INJECTION, SOLUTION INTRAVENOUS at 22:27

## 2019-06-26 RX ADMIN — CALCIUM GLUCONATE 1 G: 98 INJECTION, SOLUTION INTRAVENOUS at 22:29

## 2019-06-26 RX ADMIN — INSULIN HUMAN 10 UNITS: 100 INJECTION, SOLUTION PARENTERAL at 22:34

## 2019-06-26 RX ADMIN — SODIUM CHLORIDE 1000 ML: 9 INJECTION, SOLUTION INTRAVENOUS at 21:45

## 2019-06-26 RX ADMIN — FENTANYL CITRATE 50 MCG: 50 INJECTION INTRAMUSCULAR; INTRAVENOUS at 19:23

## 2019-06-26 ASSESSMENT — LIFESTYLE VARIABLES
DO YOU DRINK ALCOHOL: NO
SUBSTANCE_ABUSE: 1

## 2019-06-26 ASSESSMENT — ENCOUNTER SYMPTOMS
VOMITING: 0
BRUISES/BLEEDS EASILY: 0
DIARRHEA: 0
SHORTNESS OF BREATH: 0
PALPITATIONS: 0
SORE THROAT: 0
SPUTUM PRODUCTION: 0
NAUSEA: 1
FEVER: 0
ABDOMINAL PAIN: 1
NECK PAIN: 0
POLYDIPSIA: 1
FALLS: 0
HEADACHES: 0
DIZZINESS: 0
COUGH: 1
EYE REDNESS: 0
EYE PAIN: 0
LOSS OF CONSCIOUSNESS: 0
CHILLS: 0
WEAKNESS: 1
MEMORY LOSS: 0

## 2019-06-27 ENCOUNTER — APPOINTMENT (OUTPATIENT)
Dept: RADIOLOGY | Facility: MEDICAL CENTER | Age: 74
DRG: 654 | End: 2019-06-27
Attending: STUDENT IN AN ORGANIZED HEALTH CARE EDUCATION/TRAINING PROGRAM
Payer: MEDICARE

## 2019-06-27 ENCOUNTER — ANESTHESIA (OUTPATIENT)
Dept: SURGERY | Facility: MEDICAL CENTER | Age: 74
DRG: 654 | End: 2019-06-27
Payer: MEDICARE

## 2019-06-27 ENCOUNTER — ANESTHESIA EVENT (OUTPATIENT)
Dept: SURGERY | Facility: MEDICAL CENTER | Age: 74
DRG: 654 | End: 2019-06-27
Payer: MEDICARE

## 2019-06-27 PROBLEM — R31.0 GROSS HEMATURIA: Status: ACTIVE | Noted: 2019-06-27

## 2019-06-27 PROBLEM — R10.2 SUPRAPUBIC PAIN, ACUTE: Status: ACTIVE | Noted: 2019-06-27

## 2019-06-27 PROBLEM — N18.9 ACUTE RENAL FAILURE SUPERIMPOSED ON CHRONIC KIDNEY DISEASE (HCC): Status: ACTIVE | Noted: 2017-08-31

## 2019-06-27 PROBLEM — E87.5 HYPERKALEMIA: Status: ACTIVE | Noted: 2019-06-27

## 2019-06-27 PROBLEM — E86.1 HYPOTENSION DUE TO HYPOVOLEMIA: Status: ACTIVE | Noted: 2019-06-27

## 2019-06-27 PROBLEM — I95.89 HYPOTENSION DUE TO HYPOVOLEMIA: Status: ACTIVE | Noted: 2019-06-27

## 2019-06-27 PROBLEM — D64.9 ANEMIA: Status: ACTIVE | Noted: 2019-06-27

## 2019-06-27 LAB
ABO + RH BLD: NORMAL
ANION GAP SERPL CALC-SCNC: 12 MMOL/L (ref 0–11.9)
APPEARANCE UR: ABNORMAL
B-OH-BUTYR SERPL-MCNC: 0.51 MMOL/L (ref 0.02–0.27)
BACTERIA #/AREA URNS HPF: ABNORMAL /HPF
BASOPHILS # BLD AUTO: 0.2 % (ref 0–1.8)
BASOPHILS # BLD: 0.03 K/UL (ref 0–0.12)
BUN SERPL-MCNC: 59 MG/DL (ref 8–22)
CALCIUM SERPL-MCNC: 9 MG/DL (ref 8.5–10.5)
CHLORIDE SERPL-SCNC: 108 MMOL/L (ref 96–112)
CO2 SERPL-SCNC: 15 MMOL/L (ref 20–33)
COLOR UR: ABNORMAL
CREAT SERPL-MCNC: 2.93 MG/DL (ref 0.5–1.4)
EKG IMPRESSION: NORMAL
EOSINOPHIL # BLD AUTO: 0.11 K/UL (ref 0–0.51)
EOSINOPHIL NFR BLD: 0.7 % (ref 0–6.9)
EPI CELLS #/AREA URNS HPF: ABNORMAL /HPF
ERYTHROCYTE [DISTWIDTH] IN BLOOD BY AUTOMATED COUNT: 57.2 FL (ref 35.9–50)
ERYTHROCYTE [DISTWIDTH] IN BLOOD BY AUTOMATED COUNT: 59.5 FL (ref 35.9–50)
GLUCOSE SERPL-MCNC: 67 MG/DL (ref 65–99)
HCT VFR BLD AUTO: 24.7 % (ref 42–52)
HCT VFR BLD AUTO: 25 % (ref 42–52)
HCT VFR BLD AUTO: 26.5 % (ref 42–52)
HGB BLD-MCNC: 7.6 G/DL (ref 14–18)
HGB BLD-MCNC: 7.9 G/DL (ref 14–18)
HGB BLD-MCNC: 8.2 G/DL (ref 14–18)
IMM GRANULOCYTES # BLD AUTO: 0.04 K/UL (ref 0–0.11)
IMM GRANULOCYTES NFR BLD AUTO: 0.3 % (ref 0–0.9)
LACTATE BLD-SCNC: 1 MMOL/L (ref 0.5–2)
LACTATE BLD-SCNC: 2.4 MMOL/L (ref 0.5–2)
LYMPHOCYTES # BLD AUTO: 1.21 K/UL (ref 1–4.8)
LYMPHOCYTES NFR BLD: 7.9 % (ref 22–41)
MCH RBC QN AUTO: 31.5 PG (ref 27–33)
MCH RBC QN AUTO: 31.8 PG (ref 27–33)
MCHC RBC AUTO-ENTMCNC: 30.4 G/DL (ref 33.7–35.3)
MCHC RBC AUTO-ENTMCNC: 33.2 G/DL (ref 33.7–35.3)
MCV RBC AUTO: 103.7 FL (ref 81.4–97.8)
MCV RBC AUTO: 95.7 FL (ref 81.4–97.8)
MICRO URNS: ABNORMAL
MONOCYTES # BLD AUTO: 1.11 K/UL (ref 0–0.85)
MONOCYTES NFR BLD AUTO: 7.3 % (ref 0–13.4)
NEUTROPHILS # BLD AUTO: 12.79 K/UL (ref 1.82–7.42)
NEUTROPHILS NFR BLD: 83.6 % (ref 44–72)
NRBC # BLD AUTO: 0 K/UL
NRBC BLD-RTO: 0 /100 WBC
PLATELET # BLD AUTO: 202 K/UL (ref 164–446)
PLATELET # BLD AUTO: 361 K/UL (ref 164–446)
PMV BLD AUTO: 9.1 FL (ref 9–12.9)
PMV BLD AUTO: 9.1 FL (ref 9–12.9)
POTASSIUM SERPL-SCNC: 5.4 MMOL/L (ref 3.6–5.5)
RBC # BLD AUTO: 2.41 M/UL (ref 4.7–6.1)
RBC # BLD AUTO: 2.58 M/UL (ref 4.7–6.1)
RBC # URNS HPF: >150 /HPF
SODIUM SERPL-SCNC: 135 MMOL/L (ref 135–145)
SP GR UR STRIP.AUTO: ABNORMAL
WBC # BLD AUTO: 15.3 K/UL (ref 4.8–10.8)
WBC # BLD AUTO: 25.5 K/UL (ref 4.8–10.8)
WBC #/AREA URNS HPF: ABNORMAL /HPF

## 2019-06-27 PROCEDURE — 80048 BASIC METABOLIC PNL TOTAL CA: CPT

## 2019-06-27 PROCEDURE — A9270 NON-COVERED ITEM OR SERVICE: HCPCS | Performed by: HOSPITALIST

## 2019-06-27 PROCEDURE — 94664 DEMO&/EVAL PT USE INHALER: CPT

## 2019-06-27 PROCEDURE — 700111 HCHG RX REV CODE 636 W/ 250 OVERRIDE (IP): Performed by: STUDENT IN AN ORGANIZED HEALTH CARE EDUCATION/TRAINING PROGRAM

## 2019-06-27 PROCEDURE — 87077 CULTURE AEROBIC IDENTIFY: CPT

## 2019-06-27 PROCEDURE — 700102 HCHG RX REV CODE 250 W/ 637 OVERRIDE(OP): Performed by: EMERGENCY MEDICINE

## 2019-06-27 PROCEDURE — 99232 SBSQ HOSP IP/OBS MODERATE 35: CPT | Mod: GC | Performed by: HOSPITALIST

## 2019-06-27 PROCEDURE — 0W3R8ZZ CONTROL BLEEDING IN GENITOURINARY TRACT, VIA NATURAL OR ARTIFICIAL OPENING ENDOSCOPIC: ICD-10-PCS | Performed by: UROLOGY

## 2019-06-27 PROCEDURE — 160002 HCHG RECOVERY MINUTES (STAT): Performed by: UROLOGY

## 2019-06-27 PROCEDURE — 500042 HCHG BAG, URINARY DRAINAGE (CLOSED): Performed by: UROLOGY

## 2019-06-27 PROCEDURE — 160036 HCHG PACU - EA ADDL 30 MINS PHASE I: Performed by: UROLOGY

## 2019-06-27 PROCEDURE — 700102 HCHG RX REV CODE 250 W/ 637 OVERRIDE(OP): Performed by: STUDENT IN AN ORGANIZED HEALTH CARE EDUCATION/TRAINING PROGRAM

## 2019-06-27 PROCEDURE — 700105 HCHG RX REV CODE 258: Performed by: ANESTHESIOLOGY

## 2019-06-27 PROCEDURE — 700105 HCHG RX REV CODE 258: Performed by: STUDENT IN AN ORGANIZED HEALTH CARE EDUCATION/TRAINING PROGRAM

## 2019-06-27 PROCEDURE — 83605 ASSAY OF LACTIC ACID: CPT

## 2019-06-27 PROCEDURE — 700102 HCHG RX REV CODE 250 W/ 637 OVERRIDE(OP): Performed by: HOSPITALIST

## 2019-06-27 PROCEDURE — 160035 HCHG PACU - 1ST 60 MINS PHASE I: Performed by: UROLOGY

## 2019-06-27 PROCEDURE — 302306 BAG IRRIGATION 1000ML COLLECT: Performed by: HOSPITALIST

## 2019-06-27 PROCEDURE — 0T7B8DZ DILATION OF BLADDER WITH INTRALUMINAL DEVICE, VIA NATURAL OR ARTIFICIAL OPENING ENDOSCOPIC: ICD-10-PCS | Performed by: UROLOGY

## 2019-06-27 PROCEDURE — A9270 NON-COVERED ITEM OR SERVICE: HCPCS | Performed by: ANESTHESIOLOGY

## 2019-06-27 PROCEDURE — 36415 COLL VENOUS BLD VENIPUNCTURE: CPT

## 2019-06-27 PROCEDURE — 85018 HEMOGLOBIN: CPT

## 2019-06-27 PROCEDURE — A9270 NON-COVERED ITEM OR SERVICE: HCPCS | Performed by: STUDENT IN AN ORGANIZED HEALTH CARE EDUCATION/TRAINING PROGRAM

## 2019-06-27 PROCEDURE — 36430 TRANSFUSION BLD/BLD COMPNT: CPT

## 2019-06-27 PROCEDURE — 81001 URINALYSIS AUTO W/SCOPE: CPT

## 2019-06-27 PROCEDURE — 82010 KETONE BODYS QUAN: CPT

## 2019-06-27 PROCEDURE — 501329 HCHG SET, CYSTO IRRIG Y TUR: Performed by: UROLOGY

## 2019-06-27 PROCEDURE — 700111 HCHG RX REV CODE 636 W/ 250 OVERRIDE (IP): Performed by: ANESTHESIOLOGY

## 2019-06-27 PROCEDURE — 87186 SC STD MICRODIL/AGAR DIL: CPT

## 2019-06-27 PROCEDURE — 160009 HCHG ANES TIME/MIN: Performed by: UROLOGY

## 2019-06-27 PROCEDURE — 500879 HCHG PACK, CYSTO: Performed by: UROLOGY

## 2019-06-27 PROCEDURE — 71045 X-RAY EXAM CHEST 1 VIEW: CPT

## 2019-06-27 PROCEDURE — A9270 NON-COVERED ITEM OR SERVICE: HCPCS | Performed by: EMERGENCY MEDICINE

## 2019-06-27 PROCEDURE — 51798 US URINE CAPACITY MEASURE: CPT

## 2019-06-27 PROCEDURE — 99407 BEHAV CHNG SMOKING > 10 MIN: CPT

## 2019-06-27 PROCEDURE — 85025 COMPLETE CBC W/AUTO DIFF WBC: CPT

## 2019-06-27 PROCEDURE — A4346 CATH INDW FOLEY 3 WAY: HCPCS | Performed by: UROLOGY

## 2019-06-27 PROCEDURE — 700101 HCHG RX REV CODE 250: Performed by: ANESTHESIOLOGY

## 2019-06-27 PROCEDURE — P9016 RBC LEUKOCYTES REDUCED: HCPCS

## 2019-06-27 PROCEDURE — 85014 HEMATOCRIT: CPT

## 2019-06-27 PROCEDURE — 160048 HCHG OR STATISTICAL LEVEL 1-5: Performed by: UROLOGY

## 2019-06-27 PROCEDURE — 160039 HCHG SURGERY MINUTES - EA ADDL 1 MIN LEVEL 3: Performed by: UROLOGY

## 2019-06-27 PROCEDURE — 770020 HCHG ROOM/CARE - TELE (206)

## 2019-06-27 PROCEDURE — 700102 HCHG RX REV CODE 250 W/ 637 OVERRIDE(OP): Performed by: ANESTHESIOLOGY

## 2019-06-27 PROCEDURE — 30233N1 TRANSFUSION OF NONAUTOLOGOUS RED BLOOD CELLS INTO PERIPHERAL VEIN, PERCUTANEOUS APPROACH: ICD-10-PCS | Performed by: HOSPITALIST

## 2019-06-27 PROCEDURE — 160028 HCHG SURGERY MINUTES - 1ST 30 MINS LEVEL 3: Performed by: UROLOGY

## 2019-06-27 PROCEDURE — 85027 COMPLETE CBC AUTOMATED: CPT

## 2019-06-27 PROCEDURE — 0TCB8ZZ EXTIRPATION OF MATTER FROM BLADDER, VIA NATURAL OR ARTIFICIAL OPENING ENDOSCOPIC: ICD-10-PCS | Performed by: UROLOGY

## 2019-06-27 PROCEDURE — 87086 URINE CULTURE/COLONY COUNT: CPT

## 2019-06-27 RX ORDER — MORPHINE SULFATE 4 MG/ML
2 INJECTION, SOLUTION INTRAMUSCULAR; INTRAVENOUS
Status: DISCONTINUED | OUTPATIENT
Start: 2019-06-27 | End: 2019-06-27

## 2019-06-27 RX ORDER — AMIODARONE HYDROCHLORIDE 200 MG/1
200 TABLET ORAL DAILY
Status: DISCONTINUED | OUTPATIENT
Start: 2019-06-27 | End: 2019-06-27

## 2019-06-27 RX ORDER — ATORVASTATIN CALCIUM 80 MG/1
80 TABLET, FILM COATED ORAL EVERY EVENING
Status: DISCONTINUED | OUTPATIENT
Start: 2019-06-27 | End: 2019-06-29 | Stop reason: HOSPADM

## 2019-06-27 RX ORDER — BUPROPION HYDROCHLORIDE 150 MG/1
150 TABLET, EXTENDED RELEASE ORAL DAILY
Status: DISCONTINUED | OUTPATIENT
Start: 2019-06-27 | End: 2019-06-29 | Stop reason: HOSPADM

## 2019-06-27 RX ORDER — SODIUM CHLORIDE, SODIUM GLUCONATE, SODIUM ACETATE, POTASSIUM CHLORIDE AND MAGNESIUM CHLORIDE 526; 502; 368; 37; 30 MG/100ML; MG/100ML; MG/100ML; MG/100ML; MG/100ML
INJECTION, SOLUTION INTRAVENOUS
Status: DISCONTINUED | OUTPATIENT
Start: 2019-06-27 | End: 2019-06-27 | Stop reason: SURG

## 2019-06-27 RX ORDER — CARVEDILOL 6.25 MG/1
6.25 TABLET ORAL 2 TIMES DAILY WITH MEALS
Status: DISCONTINUED | OUTPATIENT
Start: 2019-06-27 | End: 2019-06-29 | Stop reason: HOSPADM

## 2019-06-27 RX ORDER — OXYCODONE HYDROCHLORIDE AND ACETAMINOPHEN 5; 325 MG/1; MG/1
1 TABLET ORAL
Status: COMPLETED | OUTPATIENT
Start: 2019-06-27 | End: 2019-06-27

## 2019-06-27 RX ORDER — PHENYLEPHRINE HYDROCHLORIDE 10 MG/ML
INJECTION, SOLUTION INTRAMUSCULAR; INTRAVENOUS; SUBCUTANEOUS PRN
Status: DISCONTINUED | OUTPATIENT
Start: 2019-06-27 | End: 2019-06-27 | Stop reason: SURG

## 2019-06-27 RX ORDER — SODIUM CHLORIDE 9 MG/ML
1000 INJECTION, SOLUTION INTRAVENOUS CONTINUOUS
Status: ACTIVE | OUTPATIENT
Start: 2019-06-27 | End: 2019-06-27

## 2019-06-27 RX ORDER — SODIUM CHLORIDE, SODIUM LACTATE, POTASSIUM CHLORIDE, CALCIUM CHLORIDE 600; 310; 30; 20 MG/100ML; MG/100ML; MG/100ML; MG/100ML
INJECTION, SOLUTION INTRAVENOUS
Status: DISCONTINUED | OUTPATIENT
Start: 2019-06-27 | End: 2019-06-27 | Stop reason: SURG

## 2019-06-27 RX ORDER — CEFAZOLIN SODIUM 1 G/3ML
INJECTION, POWDER, FOR SOLUTION INTRAMUSCULAR; INTRAVENOUS PRN
Status: DISCONTINUED | OUTPATIENT
Start: 2019-06-27 | End: 2019-06-27 | Stop reason: SURG

## 2019-06-27 RX ORDER — SODIUM CHLORIDE, SODIUM LACTATE, POTASSIUM CHLORIDE, CALCIUM CHLORIDE 600; 310; 30; 20 MG/100ML; MG/100ML; MG/100ML; MG/100ML
INJECTION, SOLUTION INTRAVENOUS CONTINUOUS
Status: DISCONTINUED | OUTPATIENT
Start: 2019-06-27 | End: 2019-06-27 | Stop reason: HOSPADM

## 2019-06-27 RX ORDER — DIPHENHYDRAMINE HYDROCHLORIDE 50 MG/ML
12.5 INJECTION INTRAMUSCULAR; INTRAVENOUS
Status: DISCONTINUED | OUTPATIENT
Start: 2019-06-27 | End: 2019-06-27 | Stop reason: HOSPADM

## 2019-06-27 RX ORDER — AMIODARONE HYDROCHLORIDE 200 MG/1
200 TABLET ORAL DAILY
Status: DISCONTINUED | OUTPATIENT
Start: 2019-06-27 | End: 2019-06-29 | Stop reason: HOSPADM

## 2019-06-27 RX ORDER — HALOPERIDOL 5 MG/ML
1 INJECTION INTRAMUSCULAR
Status: DISCONTINUED | OUTPATIENT
Start: 2019-06-27 | End: 2019-06-27 | Stop reason: HOSPADM

## 2019-06-27 RX ORDER — ONDANSETRON 2 MG/ML
4 INJECTION INTRAMUSCULAR; INTRAVENOUS
Status: DISCONTINUED | OUTPATIENT
Start: 2019-06-27 | End: 2019-06-27 | Stop reason: HOSPADM

## 2019-06-27 RX ORDER — MEPERIDINE HYDROCHLORIDE 25 MG/ML
6.25 INJECTION INTRAMUSCULAR; INTRAVENOUS; SUBCUTANEOUS
Status: DISCONTINUED | OUTPATIENT
Start: 2019-06-27 | End: 2019-06-27 | Stop reason: HOSPADM

## 2019-06-27 RX ORDER — FINASTERIDE 5 MG/1
5 TABLET, FILM COATED ORAL EVERY EVENING
Status: DISCONTINUED | OUTPATIENT
Start: 2019-06-27 | End: 2019-06-29 | Stop reason: HOSPADM

## 2019-06-27 RX ORDER — SODIUM CHLORIDE 9 MG/ML
250 INJECTION, SOLUTION INTRAVENOUS ONCE
Status: COMPLETED | OUTPATIENT
Start: 2019-06-27 | End: 2019-06-27

## 2019-06-27 RX ORDER — TAMSULOSIN HYDROCHLORIDE 0.4 MG/1
0.8 CAPSULE ORAL EVERY EVENING
Status: DISCONTINUED | OUTPATIENT
Start: 2019-06-27 | End: 2019-06-29 | Stop reason: HOSPADM

## 2019-06-27 RX ORDER — THIAMINE MONONITRATE (VIT B1) 100 MG
100 TABLET ORAL DAILY
Status: DISCONTINUED | OUTPATIENT
Start: 2019-06-27 | End: 2019-06-29 | Stop reason: HOSPADM

## 2019-06-27 RX ORDER — BUPROPION HYDROCHLORIDE 150 MG/1
150 TABLET, EXTENDED RELEASE ORAL DAILY
Status: DISCONTINUED | OUTPATIENT
Start: 2019-06-27 | End: 2019-06-27

## 2019-06-27 RX ORDER — SODIUM CHLORIDE 9 MG/ML
INJECTION, SOLUTION INTRAVENOUS CONTINUOUS
Status: DISCONTINUED | OUTPATIENT
Start: 2019-06-27 | End: 2019-06-27

## 2019-06-27 RX ORDER — OXYCODONE HYDROCHLORIDE AND ACETAMINOPHEN 5; 325 MG/1; MG/1
2 TABLET ORAL
Status: COMPLETED | OUTPATIENT
Start: 2019-06-27 | End: 2019-06-27

## 2019-06-27 RX ADMIN — CEFAZOLIN 1 G: 330 INJECTION, POWDER, FOR SOLUTION INTRAMUSCULAR; INTRAVENOUS at 06:38

## 2019-06-27 RX ADMIN — SODIUM CHLORIDE, POTASSIUM CHLORIDE, SODIUM LACTATE AND CALCIUM CHLORIDE: 600; 310; 30; 20 INJECTION, SOLUTION INTRAVENOUS at 06:38

## 2019-06-27 RX ADMIN — OXYCODONE HYDROCHLORIDE AND ACETAMINOPHEN 1 TABLET: 5; 325 TABLET ORAL at 13:07

## 2019-06-27 RX ADMIN — FENTANYL CITRATE 50 MCG: 50 INJECTION INTRAMUSCULAR; INTRAVENOUS at 08:37

## 2019-06-27 RX ADMIN — SODIUM CHLORIDE: 9 INJECTION, SOLUTION INTRAVENOUS at 10:31

## 2019-06-27 RX ADMIN — PHENYLEPHRINE HYDROCHLORIDE 100 MCG: 10 INJECTION INTRAVENOUS at 07:28

## 2019-06-27 RX ADMIN — AMIODARONE HYDROCHLORIDE 200 MG: 200 TABLET ORAL at 13:07

## 2019-06-27 RX ADMIN — SUCCINYLCHOLINE CHLORIDE 100 MG: 20 INJECTION, SOLUTION INTRAMUSCULAR; INTRAVENOUS at 07:15

## 2019-06-27 RX ADMIN — ROCURONIUM BROMIDE 30 MG: 10 INJECTION, SOLUTION INTRAVENOUS at 07:52

## 2019-06-27 RX ADMIN — BUPROPION HYDROCHLORIDE 150 MG: 150 TABLET, EXTENDED RELEASE ORAL at 15:15

## 2019-06-27 RX ADMIN — FENTANYL CITRATE 25 MCG: 50 INJECTION, SOLUTION INTRAMUSCULAR; INTRAVENOUS at 09:32

## 2019-06-27 RX ADMIN — EPHEDRINE SULFATE 10 MG: 50 INJECTION INTRAMUSCULAR; INTRAVENOUS; SUBCUTANEOUS at 07:53

## 2019-06-27 RX ADMIN — MIDAZOLAM HYDROCHLORIDE 2 MG: 1 INJECTION, SOLUTION INTRAMUSCULAR; INTRAVENOUS at 06:38

## 2019-06-27 RX ADMIN — PHENYLEPHRINE HYDROCHLORIDE 100 MCG: 10 INJECTION INTRAVENOUS at 07:53

## 2019-06-27 RX ADMIN — PHENYLEPHRINE HYDROCHLORIDE 100 MCG: 10 INJECTION INTRAVENOUS at 07:37

## 2019-06-27 RX ADMIN — PHENYLEPHRINE HYDROCHLORIDE 100 MCG: 10 INJECTION INTRAVENOUS at 07:29

## 2019-06-27 RX ADMIN — OXYCODONE HYDROCHLORIDE AND ACETAMINOPHEN 1 TABLET: 5; 325 TABLET ORAL at 00:15

## 2019-06-27 RX ADMIN — PHENYLEPHRINE HYDROCHLORIDE 100 MCG: 10 INJECTION INTRAVENOUS at 07:32

## 2019-06-27 RX ADMIN — FINASTERIDE 5 MG: 5 TABLET, FILM COATED ORAL at 17:17

## 2019-06-27 RX ADMIN — FENTANYL CITRATE 25 MCG: 50 INJECTION, SOLUTION INTRAMUSCULAR; INTRAVENOUS at 08:57

## 2019-06-27 RX ADMIN — HYDROMORPHONE HYDROCHLORIDE 0.5 MG: 1 INJECTION, SOLUTION INTRAMUSCULAR; INTRAVENOUS; SUBCUTANEOUS at 02:37

## 2019-06-27 RX ADMIN — SODIUM CHLORIDE, SODIUM GLUCONATE, SODIUM ACETATE, POTASSIUM CHLORIDE AND MAGNESIUM CHLORIDE: 526; 502; 368; 37; 30 INJECTION, SOLUTION INTRAVENOUS at 07:50

## 2019-06-27 RX ADMIN — CEFTRIAXONE SODIUM 1 G: 1 INJECTION, POWDER, FOR SOLUTION INTRAMUSCULAR; INTRAVENOUS at 13:12

## 2019-06-27 RX ADMIN — PROPOFOL 200 MG: 10 INJECTION, EMULSION INTRAVENOUS at 07:15

## 2019-06-27 RX ADMIN — ATORVASTATIN CALCIUM 80 MG: 80 TABLET, FILM COATED ORAL at 17:17

## 2019-06-27 RX ADMIN — TAMSULOSIN HYDROCHLORIDE 0.8 MG: 0.4 CAPSULE ORAL at 17:17

## 2019-06-27 RX ADMIN — FENTANYL CITRATE 100 MCG: 50 INJECTION, SOLUTION INTRAMUSCULAR; INTRAVENOUS at 07:15

## 2019-06-27 RX ADMIN — SODIUM CHLORIDE 250 ML: 9 INJECTION, SOLUTION INTRAVENOUS at 16:15

## 2019-06-27 RX ADMIN — SODIUM CHLORIDE, POTASSIUM CHLORIDE, SODIUM LACTATE AND CALCIUM CHLORIDE: 600; 310; 30; 20 INJECTION, SOLUTION INTRAVENOUS at 09:34

## 2019-06-27 RX ADMIN — SODIUM CHLORIDE 1000 ML: 9 INJECTION, SOLUTION INTRAVENOUS at 16:30

## 2019-06-27 RX ADMIN — SODIUM POLYSTYRENE SULFONATE 15 G: 15 SUSPENSION ORAL; RECTAL at 01:22

## 2019-06-27 RX ADMIN — ACETAMINOPHEN 650 MG: 325 TABLET, FILM COATED ORAL at 15:14

## 2019-06-27 RX ADMIN — MESALAMINE 1250 MG: 250 CAPSULE ORAL at 15:14

## 2019-06-27 RX ADMIN — OXYCODONE HYDROCHLORIDE AND ACETAMINOPHEN 1 TABLET: 5; 325 TABLET ORAL at 08:40

## 2019-06-27 RX ADMIN — EPHEDRINE SULFATE 10 MG: 50 INJECTION INTRAMUSCULAR; INTRAVENOUS; SUBCUTANEOUS at 07:44

## 2019-06-27 ASSESSMENT — COGNITIVE AND FUNCTIONAL STATUS - GENERAL
PERSONAL GROOMING: A LITTLE
TURNING FROM BACK TO SIDE WHILE IN FLAT BAD: A LOT
CLIMB 3 TO 5 STEPS WITH RAILING: A LITTLE
MOBILITY SCORE: 17
STANDING UP FROM CHAIR USING ARMS: A LITTLE
DAILY ACTIVITIY SCORE: 18
MOVING FROM LYING ON BACK TO SITTING ON SIDE OF FLAT BED: A LITTLE
DRESSING REGULAR UPPER BODY CLOTHING: A LITTLE
EATING MEALS: A LITTLE
DRESSING REGULAR LOWER BODY CLOTHING: A LITTLE
HELP NEEDED FOR BATHING: A LITTLE
WALKING IN HOSPITAL ROOM: A LITTLE
MOVING TO AND FROM BED TO CHAIR: A LITTLE
TOILETING: A LITTLE
SUGGESTED CMS G CODE MODIFIER MOBILITY: CK
SUGGESTED CMS G CODE MODIFIER DAILY ACTIVITY: CK

## 2019-06-27 ASSESSMENT — COPD QUESTIONNAIRES
HAVE YOU SMOKED AT LEAST 100 CIGARETTES IN YOUR ENTIRE LIFE: YES
IN THE PAST 12 MONTHS DO YOU DO LESS THAN YOU USED TO BECAUSE OF YOUR BREATHING PROBLEMS: DISAGREE/UNSURE
DO YOU EVER COUGH UP ANY MUCUS OR PHLEGM?: YES, A FEW DAYS A WEEK OR MONTH
DURING THE PAST 4 WEEKS HOW MUCH DID YOU FEEL SHORT OF BREATH: SOME OF THE TIME
DURING THE PAST 4 WEEKS HOW MUCH DID YOU FEEL SHORT OF BREATH: SOME OF THE TIME
COPD SCREENING SCORE: 6
DO YOU EVER COUGH UP ANY MUCUS OR PHLEGM?: YES, A FEW DAYS A WEEK OR MONTH
COPD SCREENING SCORE: 6
HAVE YOU SMOKED AT LEAST 100 CIGARETTES IN YOUR ENTIRE LIFE: YES

## 2019-06-27 ASSESSMENT — ENCOUNTER SYMPTOMS
COUGH: 1
DOUBLE VISION: 0
ABDOMINAL PAIN: 0
VOMITING: 0
SEIZURES: 0
SHORTNESS OF BREATH: 0
NAUSEA: 0
BACK PAIN: 0
CHILLS: 0
DEPRESSION: 0
SORE THROAT: 0
FLANK PAIN: 0
HEADACHES: 0
WEAKNESS: 1
ORTHOPNEA: 0
FEVER: 0
NERVOUS/ANXIOUS: 0
BLOOD IN STOOL: 0
PALPITATIONS: 0
DIZZINESS: 1
FOCAL WEAKNESS: 0
BLURRED VISION: 0

## 2019-06-27 ASSESSMENT — PATIENT HEALTH QUESTIONNAIRE - PHQ9
SUM OF ALL RESPONSES TO PHQ9 QUESTIONS 1 AND 2: 0
1. LITTLE INTEREST OR PLEASURE IN DOING THINGS: NOT AT ALL
2. FEELING DOWN, DEPRESSED, IRRITABLE, OR HOPELESS: NOT AT ALL

## 2019-06-27 ASSESSMENT — LIFESTYLE VARIABLES
EVER_SMOKED: YES
EVER_SMOKED: YES
ALCOHOL_USE: NO

## 2019-06-27 NOTE — OR NURSING
Pt arrived on unit and stated he drank orange juice at 0500. Claimed it was 2 cartoons of juice.     Called the RN and he was unaware of fluid intake. CNA on unit then admitted to giving patient orange juice 2 cartoons pulp free.     Updated Dr. Morgan of the po intake and he is ok to proceed. Called OR to update them and determine appropriate anesthesiologist to call. Dr. Gonzales called and spoke with myself and Dr. Morgan. Okay to proceed with surgery as scheduled.

## 2019-06-27 NOTE — ASSESSMENT & PLAN NOTE
-Patient presented with gross hematuria on Xarelto  - Pt denied any fever or systemic symptoms on presentation  - Urinalysis showed hematuria and increased WBC of 100  - Lactic acid 2.4 which trended down to 1 after fluid resuscitation and antibiotic  -Patient received 3 doses of ceftriaxone  -Follow-up urine culture grew E. Coli, pansensitive to many antibiotics  -Leukocytosis resolved    Plan  -Discontinue ceftriaxone  -Start trimethoprim sulfamethoxazole DS twice daily for 2 weeks  -Follow-up with PCP as outpatient

## 2019-06-27 NOTE — ASSESSMENT & PLAN NOTE
-Patient presenting to the emergency department calcium is 6.2.  EKG without peaked T waves.  -Resolved  -Patient was on potassium supplement.  Discontinue and follow-up with PCP as outpatient

## 2019-06-27 NOTE — ED NOTES
Patient transferred to [facility]. Patient resting comfortably in [facility], call light within reach

## 2019-06-27 NOTE — DIETARY
"Nutrition services: Day 1 of admit. Khai Munoz is a 73 y.o. male with admitting DX of hematuria.    Consult received for weight loss PTA, BMI <19 (=18.84).    Assessment:  Height: 172.7 cm (5' 8\")  Weight: 56.2 kg (123 lb 14.4 oz)  Body mass index is 18.84 kg/m²., BMI classification: underweight  Diet/Intake: cardiac, no PO recorded yet    Evaluation:   1. Pt reports UBW of 130# which has not been stable for him. He reports that he lost weight down to around 120# 'a month ago' d/t poor appetite. When asked if he has regained the weight, he states that he has. Wt was 123# by standing scale 6/27 which reflects severe 5.4% weight loss x1 month.  2. Pt reports that he eats when he wants to at home. He sometimes has snacks. Nutrition rep will be visiting with pt today for snack and meal preferences.  3. Pt is s/p cytoscopy, clot evacuation and fulgeration procedure today 2' radiation cystitis with retention of clots, per review of notes.  4. Labs reviewed  5. Meds: Lipitor, Rocephin, Pentasa, Miralax, Thiamine, (PRN: Percocet), NS @ 30mL/hr     Malnutrition Risk: At risk 2' severe 5.4% wt loss x1 month (based on UBW down to admit weight), and underweight BMI, however, unable to meet criteria at this time.    Recommendations/Plan:  1. Continue cardiac diet. Kitchen staff to see pt today to offer snacks and adjust meals.  2. Encourage intake of meals  3. Document intake of all meals as % taken in ADL's to provide interdisciplinary communication across all shifts.   4. Monitor weight.  5. Nutrition rep will continue to see patient for ongoing meal and snack preferences.     RD monitoring.      "

## 2019-06-27 NOTE — SENIOR ADMIT NOTE
[facility] INTERNAL MEDICINE SENIOR ADMIT NOTE:    Patient ID:   Name:             Khai Munoz   YOB: 1945  Age:                 73 y.o.  male   MRN:               [id number]                                                          Chief Complaint:       Hematuria and penile pain    History of Present Illness:    Mr. Munoz is a 73 y.o. male with a PMHx of stage C systolic heart failure (follows with Dr. Davila), COPD, aortic aneurysm, coronary artery disease, prostate cancer, recent pulmonary embolism (on Eliquis), significant smoking history, that presents for hematuria and penile pain.  Urology was consulted in the ED and believes he is experiencing bladder obstruction with hematuria likely due to prior radiation and being on Xarelto.    ROS: Positive for hematuria, chronic cough, penile pain  EX: CV- tachycardic, regular rhythm, no murmurs.  Respirations-bilaterally clear to auscultation.  Maroon blood discharge from penis.  LABS: Positive for leukocytosis with elevated neutrophils, microcytic anemia, hyperkalemia (6.2), high anion gap metabolic acidosis (17, 13), TD on CKD (creatinine 2.64, baseline 1.5-2.).  INR 1.33.  IMAGING: Renal CT showed blood thinners and bladder no urolithiasis, no.  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  Status post TURP.    Active Ambulatory Problems     Diagnosis Date Noted   • Abdominal aneurysm ([facility]) 02/15/2017   • Aorta aneurysm ([facility]) 08/31/2017   • NSTEMI (non-ST elevated myocardial infarction) ([facility]) 08/31/2017   • Chest pain 08/31/2017   • TD (acute kidney injury) ([facility]) 08/31/2017   • Nausea 08/31/2017   • COPD (chronic obstructive pulmonary disease) ([facility]) 08/31/2017   • Hypokalemia 09/02/2017   • Alcohol use 09/02/2017   • Anxiety 09/03/2017   • Leukocytosis 09/04/2017   • ACC/AHA stage C systolic heart failure ([facility]) 10/11/2017   • Ischemic cardiomyopathy 10/11/2017     Resolved Ambulatory Problems     Diagnosis Date Noted   • No Resolved Ambulatory Problems     Past  "Medical History:   Diagnosis Date   • Arrhythmia    • Asthma    • Atrial fibrillation (HCC)    • Breath shortness    • Cancer (HCC) 2006   • Chronic cough    • Dental disorder    • Emphysema of lung (HCC)    • High cholesterol    • Psychiatric problem    • 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HCC)    • Tinnitus    • Urinary bladder disorder        PHYSICAL EXAM  Vitals:   Weight/BMI: Body mass index is 18.84 kg/m².  /67   Pulse (!) 124   Temp 37 °C (98.6 °F) (Temporal)   Resp 17   Ht 1.727 m (5' 8\")   Wt 56.2 kg (123 lb 14.4 oz)   SpO2 96%   Vitals:    06/26/19 2101 06/26/19 2200 06/26/19 2230 06/27/19 0000   BP:    125/67   Pulse: (!) 125 (!) 102 (!) 110 (!) 124   Resp:    17   Temp:    37 °C (98.6 °F)   TempSrc:    Temporal   SpO2: (!) 73% 95% 97% 96%   Weight:    56.2 kg (123 lb 14.4 oz)   Height:         Oxygen Therapy:  Pulse Oximetry: 96 %, O2 (LPM): 0, O2 Delivery: None (Room Air)      IMPRESSION  The patient is a 73-year-old male with recent PE (on Eliquis), prostate cancer who presented with with hematuria and bladder obstruction.    ASSESSMENT/PLAN  #Hematuria   -Plan: Urology planning cystoscopy.  Recommended continual bladder irrigation.  Repeating H&H   Pain control with hydromorphone  #History pulmonary embolism   -Plan: Holding Eliquis, then restart following procedure.  #Macrocytic anemia   -Plan: Working up anemia.  Will transfuse if hemoglobin less than 8 given cardiomyopathy  #Leukocytosis with elevated neutrophils   -Unclear source   -Has COPD and chronic cough   -Plan: Pending portable chest x-ray.  Pending urinalysis.  #Hyperkalemia   -Plan: Continue to monitor.  Received calcium gluconate and started Kayexalate  #Metabolic acidosis   -Likely associated with CKD   -Plan: Pending urinalysis, lactic acid, beta hydroxybutyrate.  #COPD   -Denies shortness of breath   -Plan: Continue monitor, RT protocol.  #Acute on chronic kidney disease   -Possible post renal   -Plan: Continue to monitor, " address    Please refer to Interns Dr. Simon H&P for complete admission details.

## 2019-06-27 NOTE — ANESTHESIA TIME REPORT
Anesthesia Start and Stop Event Times     Date Time Event    6/27/2019 0638 Anesthesia Start     0813 Anesthesia Stop        Responsible Staff  06/27/19    Name Role Begin End    Shekhar Gonzales M.D. Anesth 0638 0813        Preop Diagnosis (Free Text):  Pre-op Diagnosis     BLADDER OBSTRUCTION AND HEMATURIA        Preop Diagnosis (Codes):  Diagnosis Information     Diagnosis Code(s):         Post op Diagnosis  Hematuria      Premium Reason  Non-Premium    Comments:

## 2019-06-27 NOTE — PROGRESS NOTES
Internal Medicine Interval Note  Note Author: Nguyen Mejia M.D.     Name Khai Munoz     1945   Age/Sex 73 y.o. male   MRN [id number]   Code Status DNAR/DNI     After 5PM or if no immediate response to page, please call for cross-coverage  Attending/Team:Dr. Alexandre/RODRIGO Red See Patient List for primary contact information  Call [phone] to page    1st Call - Day Intern (R1):   Dr. Mejia 2nd Call - Day Sr. Resident (R2/R3):   Dr. Earl     Reason for interval visit  (Principal Problem)   Hematuria  Anemia    Interval Problem Daily Status Update  (24 hours, problem oriented, brief subjective history, new lab/imaging data pertinent to that problem)   Mr Munoz, 73-year-old male presented with gross hematuria which started about 3 weeks ago, occurring intermittently but got worse the day prior to presentation.  Patient has a history of smoking and radiation exposure after prostate cancer treatment about 12 years ago.  Patient has a history of PE and was on Eliquis.  Urology was consulted, patient underwent cystoscopy which showed multiple prostatic varices that are friable and bleed easily.  Cauterized with good hemostasis.  Bladder was assessed, free of any tumors or other abnormalities.  Patient's hemoglobin dropped from 8.4-7.6 but appeared much pale after cystoscopy.  Due to significant cardiac history, transfusion threshold for this patient is less than 8 g/dL.    Evaluated patient after cystoscopy, patient complains feeling weak.  Patient to receive 2 units of blood transfusion and will repeat H&H and monitor every 8 hourly.  Patient denies any fever or other systemic symptoms of infection but patient has leukocytosis with elevated lactic acid on admission, will treat empirically with ceftriaxone.  Patient's blood pressure was initially very low but has been gradually improving since the initiation of blood transfusion.  We will continue to monitor    Review of Systems    Constitutional: Positive for malaise/fatigue. Negative for chills and fever.   HENT: Negative for nosebleeds and sore throat.    Eyes: Negative for blurred vision and double vision.   Respiratory: Positive for cough (chronic). Negative for shortness of breath.    Cardiovascular: Negative for chest pain, palpitations, orthopnea and leg swelling.   Gastrointestinal: Negative for abdominal pain, blood in stool, nausea and vomiting.   Genitourinary: Positive for hematuria (resolved after CBI). Negative for dysuria, flank pain and frequency.   Musculoskeletal: Negative for back pain and joint pain.   Skin: Negative for rash.   Neurological: Positive for dizziness and weakness. Negative for focal weakness, seizures and headaches.   Psychiatric/Behavioral: Negative for depression. The patient is not nervous/anxious.      Disposition/Barriers to discharge:   Remain inpatient for stabilization and blood transfusion    Consultants/Specialty  PCP: Baldo Fajardo M.D.    Quality Measures  Quality-Core Measures   Reviewed items::  Labs reviewed and Medications reviewed  White catheter::  Gross Hematuria with Clots  DVT prophylaxis pharmacological::  Contraindicated - High bleeding risk  DVT prophylaxis - mechanical:  SCDs    Physical Exam       Vitals:    06/27/19 1200 06/27/19 1254 06/27/19 1300 06/27/19 1339   BP: (!) 97/37 106/46 104/74 105/51   Pulse: 67 67 69 91   Resp: 18 16 16 16   Temp: 36.6 °C (97.9 °F) 36.7 °C (98.1 °F) 36.7 °C (98 °F) 36.7 °C (98.1 °F)   TempSrc: Temporal Temporal Temporal    SpO2: 98% 98% 100% 92%   Weight:       Height:         Body mass index is 18.84 kg/m². Weight: 56.2 kg (123 lb 14.4 oz)  Oxygen Therapy:  Pulse Oximetry: 92 %, O2 (LPM): 3, O2 Delivery: Silicone Nasal Cannula    Physical Exam   Constitutional: He is oriented to person, place, and time.   Pale   HENT:   Head: Normocephalic and atraumatic.   Mouth/Throat: No oropharyngeal exudate.   Eyes: Pupils are equal, round, and reactive  to light. EOM are normal.   Neck: Normal range of motion. Neck supple.   Cardiovascular: Normal rate, regular rhythm and normal heart sounds.    Pulmonary/Chest: Effort normal and breath sounds normal. No respiratory distress. He has no wheezes.   Abdominal: Soft. Bowel sounds are normal. He exhibits no distension. There is no tenderness.   Genitourinary:   Genitourinary Comments: White: no hematuria   Musculoskeletal: Normal range of motion. He exhibits no edema.   Neurological: He is alert and oriented to person, place, and time. GCS score is 15.   Skin: Skin is warm and dry. There is pallor.   Psychiatric: Mood and affect normal.   Nursing note and vitals reviewed.      Assessment/Plan     * Gross hematuria   Assessment & Plan    -Patient presenting with gross hematuria, and episode of hematuria approximate 3 weeks ago.  Additionally, it sounds like patient has been passing blood clots intermittently since that first episode of hematuria 3 weeks ago.  Patient has a significant smoking history, and radiation exposure after prostate cancer treatment which are both risk factors for bladder cancer/injury.   -Patient has a history of PE about 1 month ago and was on Eliquis  -CT scan is concerning for possible bladder cancer.  -Cystoscopy: multiple prostate varices that are friable and bleed easily. Cauterized with good hemostasis. Bladder free of tumors or other abnormalities     Plan  -Advance diet  -D/C Continuous bladder irrigation since no hematuria.  -2 large-bore IV cannula  -Transfuse 2 units of blood  -Monitor hemoglobin and transfuse if less than 8.  -I discussed the risks and the benefits with the patient, and he verbalized understanding, and agreement to transfusions as needed.     Hypotension due to hypovolemia   Assessment & Plan    -Pt had massive blood loss  -Low BP  - Transfuse 2 units of blood, repeat cbc and continue to monitor.   - Transfuse if HGB < 8g/dl     Anemia   Assessment & Plan     -Secondary to gross hematuria.  As patient has significant cardiac history transfusion threshold will be 8 g/dL.  -Transfuse 2 units of blood  -Repeat cbc 2 hrs after blood transfusion     Hyperkalemia   Assessment & Plan    -Patient presenting to the emergency department calcium is 6.2.  EKG without peaked T waves.  -Resolved  -Continue to monitor     Ischemic cardiomyopathy- (present on admission)   Assessment & Plan    Patient with a substantial cardiac history.  With concurrent hyperkalemia and cardiac history patient will be admitted to telemetry for monitoring.     Leucocytosis- (present on admission)   Assessment & Plan    - Pt denies any fever   - Urinalysis was normal  - Lactic acid 2.4 on admission, was not able to get repeat as pt is receiving blood. Will recheck after blood transfusion    Plan  - Ceftriaxone 1gram daily  - f/u blood and urine culture  - repeat lactic acid     Acute renal failure superimposed on chronic kidney disease (HCC)- (present on admission)   Assessment & Plan    -Likely secondary to obstruction from blood clots.  -Supportive management.     Suprapubic pain, acute   Assessment & Plan    -Resolved after continuous bladder irrigation     COPD (chronic obstructive pulmonary disease) (HCC)- (present on admission)   Assessment & Plan    -Patient denies any changes to his chronic cough.  -RT per protocol.

## 2019-06-27 NOTE — ED NOTES
Report called to Khai HART on tele 8. Pt moved to red 5 for further monitoring until transport to floor. Report to Elena HART. Remove myself from care.

## 2019-06-27 NOTE — CARE PLAN
Problem: Nutritional:  Goal: Achieve adequate nutritional intake  Patient will consume >50% of meals/snacks consistently  Outcome: NOT MET

## 2019-06-27 NOTE — ED TRIAGE NOTES
".  Chief Complaint   Patient presents with   • Penis Pain     Bleed from meatus   • Blood in Urine   • Lower GI Bleed     Per transfer note   Pt BIB EMS - transfer from [facility].  Pt c/o blood from meatus of penis, onset 0200 this morning. Pt states \" I feel like I have to pee then just blood comes out, I can't control it. \"  No abdominal pain.  No fever/chills.  No lightheadedness at this time.  Pt has history of prostate CA.  Pt received Morphine sulfate 8mg IV, Zofran 4mg IV,  ml PTA.     "

## 2019-06-27 NOTE — RESPIRATORY CARE
COPD EDUCATION by COPD CLINICAL EDUCATOR  6/27/2019  at  3:29 PM by Tiffanie Barcenas     Patient interviewed by COPD education team.  Short intervention with education has been conducted.  A comprehensive packet including information about COPD, treatments, and smoking cessation given.

## 2019-06-27 NOTE — ED PROVIDER NOTES
ED Provider Note    CHIEF COMPLAINT  Chief Complaint   Patient presents with   • Penis Pain     Bleed from meatus   • Blood in Urine   • Lower GI Bleed     Per transfer note       HPI  Khai Munoz is a 73 y.o. male who presents as a transfer from [facility].  He initially presented today for bloody urine over the past 2 days.  Pain started about 12 hours ago.  He states that he has a history of UC/IBD/rectal ulcer.  Has had rectal bleeding on and off for several months.  Last colonoscopy was a few months ago in [facility].    The patient is on Eliquis.  He has a history of A. fib and also states he had a pulmonary embolism about 2 months ago.  No chest pain or trouble breathing currently.  No nausea or vomiting.    The patient was sent here for further evaluation.  The patient did state that he saw his urologist yesterday and mention the bloody urine however did not pursue this further and his urologist is now on vacation.  He does have a prior history of prostate cancer and urethral stricture.  Notes that he had a cystoscopy recently in the past few months..    REVIEW OF SYSTEMS  See HPI for further details. All other systems are negative.     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    SOCIAL HISTORY  Social History     Social History Main Topics   • Smoking status: Current Every Day Smoker     Packs/day: 0.25     Years: 50.00     Types: Cigarettes   • Smokeless tobacco: Never Used   • Alcohol use 3.6 - 4.8 oz/week     6 - 8 Shots of liquor per week      Comment: 375ml per day fo [place]   • Drug use: Yes      Comment: daily marijuana   • Sexual activity: Not on file       SURGICAL HISTORY   has a past surgical history that includes other; other; cyst  "excision; aaa with stent graft (2/15/2017); and angioplasty (09/2017).    CURRENT MEDICATIONS  Home Medications     Reviewed by Emani Staples (Pharmacy Tech) on 06/26/19 at 2207  Med List Status: Complete   Medication Last Dose Status   acetaminophen (TYLENOL) 500 MG Tab 6/25/2019 Active   amiodarone (CORDARONE) 200 MG Tab 6/25/2019 Active   apixaban (ELIQUIS) 5mg Tab 6/25/2019 Active   atorvastatin (LIPITOR) 80 MG tablet 6/25/2019 Active   buPROPion SR (WELLBUTRIN-SR) 150 MG TABLET SR 12 HR sustained-release tablet 6/25/2019 Active   carvedilol (COREG) 6.25 MG Tab 6/25/2019 Active   ferrous sulfate 325 (65 Fe) MG tablet 6/26/2019 Active   finasteride (PROSCAR) 5 MG Tab 6/25/2019 Active   furosemide (LASIX) 40 MG Tab 6/25/2019 Active   HYDROcodone-acetaminophen (NORCO) 5-325 MG Tab per tablet 6/25/2019 Active   lisinopril (PRINIVIL) 5 MG Tab 6/25/2019 Active   mesalamine (LIALDA) 1.2 GM Tablet Delayed Response 6/25/2019 Active   phenazopyridine (PYRIDIUM) 100 MG Tab 6/25/2019 Active   potassium chloride SA (KDUR) 20 MEQ Tab CR 6/25/2019 Active   spironolactone (ALDACTONE) 25 MG Tab 6/25/2019 Active   tamsulosin (FLOMAX) 0.4 MG capsule 6/25/2019 Active   thiamine (THIAMINE) 100 MG Tab 6/25/2019 Active                ALLERGIES  Allergies   Allergen Reactions   • Declomycin      Reaction a long time ago       PHYSICAL EXAM  VITAL SIGNS: /81   Pulse 99   Temp 36.4 °C (97.5 °F) (Temporal)   Resp (!) 24   Ht 1.727 m (5' 8\")   Wt 59 kg (130 lb)   BMI 19.77 kg/m²   Pulse ox interpretation: I interpret this pulse ox as normal.  Constitutional: Alert in no apparent distress.  HENT: No signs of trauma, Bilateral external ears normal, Nose normal.   Eyes: Pupils are equal and reactive, Conjunctiva normal, Non-icteric.   Neck: Normal range of motion, No tenderness, Supple, No stridor.   Cardiovascular: Tachycardic rate and regular rhythm.   Thorax & Lungs: Normal breath sounds, No respiratory distress, No " wheezing, No chest tenderness.   Abdomen: Bowel sounds normal, Soft, superficial tenderness, No masses, No pulsatile masses. No peritoneal signs.  : Blood clots from the meatus of the penis.  No signs of trauma.  Skin: Warm, Dry, No erythema, No rash.   Back: No bony tenderness, No CVA tenderness.   Musculoskeletal: Good range of motion in all major joints. No tenderness to palpation or major deformities noted.   Neurologic: Alert, Normal motor function and gait, Normal sensory function, No focal deficits noted.        DIAGNOSTIC STUDIES / PROCEDURES    EKG - Physician interpretation  Results for orders placed or performed during the hospital encounter of 19   EKG   Result Value Ref Range    Report       [facility]    Test Date:  2019  Pt Name:    AMANDO CANO                Department: ER  MRN:        [id number]                      Room:       T833  Gender:     Male                         Technician: [id number]  :        [id number]                   Requested By:CHIVO MICHELLE  Order #:    [id number]                    Reading MD: CHIVO MICHELLE MD    Measurements  Intervals                                Axis  Rate:       102                          P:          73  KS:         188                          QRS:        57  QRSD:       88                           T:          70  QT:         336  QTc:        438    Interpretive Statements  SINUS TACHYCARDIA  LOW VOLTAGE IN FRONTAL LEADS  CONSIDER ANTEROSEPTAL INFARCT  BASELINE WANDER IN LEAD(S) I,II,aVR  Compared to ECG 2018 13:45:30  Electronically Signed On 2019 1:52:38 PDT by CHIVO MICHELLE MD           LABS  Labs Reviewed   CBC WITH DIFFERENTIAL - Abnormal; Notable for the following:        Result Value    WBC 14.5 (*)     RBC 2.69 (*)     Hemoglobin 8.4 (*)     Hematocrit 27.4 (*)     .9 (*)     MCHC 30.7 (*)     RDW 58.3 (*)     MPV 8.7 (*)     Neutrophils-Polys 80.90 (*)     Lymphocytes 12.40 (*)      Neutrophils (Absolute) 11.71 (*)     All other components within normal limits    Narrative:     Indicate which anticoagulants the patient is on:->NONE   COMP METABOLIC PANEL - Abnormal; Notable for the following:     Potassium 6.2 (*)     Co2 17 (*)     Anion Gap 13.0 (*)     Glucose 110 (*)     Bun 56 (*)     Creatinine 2.64 (*)     Alkaline Phosphatase 22 (*)     All other components within normal limits    Narrative:     Indicate which anticoagulants the patient is on:->NONE   PROTHROMBIN TIME - Abnormal; Notable for the following:     PT 16.8 (*)     INR 1.33 (*)     All other components within normal limits    Narrative:     Indicate which anticoagulants the patient is on:->NONE   APTT - Abnormal; Notable for the following:     APTT 37.2 (*)     All other components within normal limits    Narrative:     Indicate which anticoagulants the patient is on:->NONE   COD (ADULT) - Abnormal; Notable for the following:     Antibody Screen-Cod POS (*)     All other components within normal limits   ESTIMATED GFR - Abnormal; Notable for the following:     GFR If  29 (*)     GFR If Non  24 (*)     All other components within normal limits    Narrative:     Indicate which anticoagulants the patient is on:->NONE   ANTIBODY IDENTIFICATION - Abnormal; Notable for the following:     Antibody Id POS, anti-K (*)     Antibody Id POS, AntibodySpecificity:Undetermined (*)     All other components within normal limits   BASIC METABOLIC PANEL - Abnormal; Notable for the following:     Co2 15 (*)     Bun 59 (*)     Creatinine 2.93 (*)     Anion Gap 12.0 (*)     All other components within normal limits   ESTIMATED GFR - Abnormal; Notable for the following:     GFR If  26 (*)     GFR If Non  21 (*)     All other components within normal limits   ACCU-CHEK GLUCOSE - Abnormal; Notable for the following:     Glucose - Accu-Ck 181 (*)     All other components within normal  limits   ABO RH CONFIRM   RBC ANTIGEN TYPING, K1 (RAJESH)   MAGNESIUM    Narrative:     Indicate which anticoagulants the patient is on:->NONE   URINALYSIS   URINALYSIS,CULTURE IF INDICATED   HEMOGLOBIN AND HEMATOCRIT         RADIOLOGY  CT-RENAL COLIC EVALUATION(A/P W/O)   Final Result      Blood layers in the bladder with no source visualized. No urolithiasis or hydronephrosis. Uroepithelial tumor is possible. None are visualized by noncontrast CT. Bladder mass is of greatest concern although not distinctly seen      Status post prostate seed implants with apparent prior TURP procedure. 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      OUTSIDE IMAGES-DX CHEST   Final Result      OUTSIDE IMAGES-DX CHEST   Final Result            COURSE & MEDICAL DECISION MAKING    Medications   acetaminophen (TYLENOL) tablet 650 mg (not administered)   labetalol (NORMODYNE,TRANDATE) injection 10 mg (not administered)   polyethylene glycol/lytes (MIRALAX) PACKET 1 Packet (not administered)   oxyCODONE-acetaminophen (PERCOCET) 5-325 MG per tablet 1 Tab (1 Tab Oral Given 6/27/19 0015)   HYDROmorphone pf (DILAUDID) injection 0.5 mg (not administered)   fentaNYL (SUBLIMAZE) injection 50 mcg (50 mcg Intravenous Given 6/26/19 1923)   fentaNYL (SUBLIMAZE) injection 50 mcg (50 mcg Intravenous Given 6/26/19 2037)   NS infusion 1,000 mL (0 mL Intravenous Stopped 6/26/19 2237)   sodium polystyrene (KAYEXALATE) 15 GM/60ML suspension 15 g (15 g Oral Given 6/27/19 0122)   calcium GLUConate injection 1 g (1 g Intravenous Given 6/26/19 2229)   insulin regular (HUMULIN R) injection 10 Units (10 Units Subcutaneous Given 6/26/19 2234)   DEXTROSE 10% BOLUS 250 mL (250 mL Intravenous Given 6/26/19 2227)       Pertinent Labs & Imaging studies reviewed. (See chart for details)  73 y.o. male presenting  "with hematuria.  He was transferred from an outside hospital.  He was found to have minimal residual urine in the bladder on bladder scan.  No vomiting or fevers.  Has active clots from the meatus.  Patient is on chronic anticoagulation for A. fib with Eliquis.  He does have some scant bright red blood per rectum.  Has a history of rectal ulcer.  This does not appear to be the main cause of the patient's discomfort or presenting symptoms.    Patient does note cystoscopy about 2 months ago that was unremarkable.  Denies prior history of hematuria.    CT abdomen pelvis was ordered for further evaluation.  Layering of blood and distended bladder was identified.    Laboratory studies showing that the patient has worsening renal failure with hyperkalemia.  Concern for renal failure due to urinary retention.    Ordered placement of three-way White with continuous bladder irrigation.    EKG was performed due to the patient's hyperkalemia.  No obvious signs of hyperkalemia.  Patient was treated with medications for his hyperkalemia however.  Given insulin, glucose, calcium, Kayexalate.    10:15 PM spoke with the on-call urologist.  Dr. Morgan.  Recommending n.p.o. after midnight and will plan for cystoscopy in the morning at approximately 7 AM.    The patient will be admitted to the [facility] internal medicine service.  To continue monitoring hemoglobin and bleeding.    HYDRATION: Based on the patient's presentation of Acute Kindney Injury and Tachycardia the patient was given IV fluids. IV Hydration was used because oral hydration was not as rapid as required. Upon recheck following hydration, the patient was improved.    /67   Pulse (!) 124   Temp 37 °C (98.6 °F) (Temporal)   Resp 17   Ht 1.727 m (5' 8\")   Wt 56.2 kg (123 lb 14.4 oz)   SpO2 96%   BMI 18.84 kg/m²       The total critical care time on this patient is 35 minutes, resuscitating patient, speaking with admitting physician, and deciphering test results. " This 35 minutes is exclusive of separately billable procedures.      FINAL IMPRESSION  Hematuria  Hyperkalemia   Urinary retention      Electronically signed by: Antonio Parr, 6/26/2019 6:00 PM

## 2019-06-27 NOTE — ANESTHESIA PROCEDURE NOTES
Airway  Date/Time: 6/27/2019 7:28 AM  Performed by: DIANELYS SOTO  Authorized by: DIANELYS SOTO     Location:  OR  Urgency:  Elective  Indications for Airway Management:  Anesthesia  Spontaneous Ventilation: absent    Sedation Level:  Deep  Preoxygenated: Yes    Patient Position:  Sniffing  Final Airway Type:  Endotracheal airway  Final Endotracheal Airway:  ETT  Cuffed: Yes    Technique Used for Successful ETT Placement:  Direct laryngoscopy  Insertion Site:  Oral  Measured from:  Teeth  Placement Verified by: auscultation and capnometry    Number of Attempts at Approach:  1

## 2019-06-27 NOTE — ED NOTES
Pt requesting additional pain medication, states last medication did not work. Pt has urinal in position but states he can't urinate.

## 2019-06-27 NOTE — ASSESSMENT & PLAN NOTE
-Secondary to gross hematuria.  As patient has significant cardiac history transfusion threshold was 8 g/dL.  -Patient received 2 units of blood on 6/27  -Hemoglobin is stable at 8.9  -Patient is medically stable to be discharged with outpatient follow-up  -Return to the ER if any new bleeding

## 2019-06-27 NOTE — PROGRESS NOTES
Report received from [facility] RN Max. Patient currently in OR, has been there since 0600. Will assess upon return to floor.

## 2019-06-27 NOTE — ANESTHESIA POSTPROCEDURE EVALUATION
Patient: Khai Munoz    Procedure Summary     Date:  06/27/19 Room / Location:  [place] [address] / SURGERY [facility]    Anesthesia Start:  0638 Anesthesia Stop:  0816    Procedure:  CYSTOSCOPY - clot evacuation psb dilation of stricture (N/A Bladder) Diagnosis:  (BLADDR  OBSTRUCTION AND HEMATURIA)    Surgeon:  Robert Morgan M.D. Responsible Provider:  Shekhar Gonzales M.D.    Anesthesia Type:  general ASA Status:  3 - Emergent          Final Anesthesia Type: general  Last vitals  BP   Blood Pressure : 104/47, NIBP: 141/69    Temp   36.4 °C (97.5 °F)    Pulse   Pulse: 68, Heart Rate (Monitored): (!) 110   Resp   16    SpO2   95 %      Anesthesia Post Evaluation    Patient location during evaluation: PACU  Patient participation: complete - patient participated  Level of consciousness: awake and alert    Airway patency: patent  Anesthetic complications: no  Cardiovascular status: hemodynamically stable  Respiratory status: acceptable  Hydration status: euvolemic    PONV: none           Nurse Pain Score: 7 (NPRS)

## 2019-06-27 NOTE — H&P
Internal Medicine Admitting History and Physical    Note Author: Gregory Simon M.D.       Name Khai Munoz     1945   Age/Sex 73 y.o. male   MRN [id number]   Code Status DNR/DNI     After 5PM or if no immediate response to page, please call for cross-coverage  Attending/Team: Dr Alexandre / Otoniel See Patient List for primary contact information  Call [phone] to page    1st Call - Day Intern (R1):   Dr Mejia 2nd Call - Day Sr. Resident (R2/R3):   Dr Earl       Chief Complaint:     Penis pain and blood from my penis    HPI:    Mr Munoz is a 73-year-old male with past medical history of prostate cancer, pulmonary embolism on apixaban, abdominal aortic aneurysm, atrial for ablation, congestive heart failure, inflammatory bowel disease, external hemorrhoids, hypertension, ischemic cardiomyopathy, chronic kidney disease, COPD, mood disorders, and myocardial infarction who presents to hospital for penile pain and blood per penis.    Patient reports onset of penis pain, and concurrent bloody discharge from his penis at approximately 2 AM this morning.  Patient denies inciting event to include trauma.  Additionally, patient denies any sick-like symptoms recently such as dysuria preceding this event, or other changes to his lower urinary tract symptoms.  States that after the initial episode this morning his pain and bloody discharge resolved, but then restarted later in the a.m., and is gotten progressively worse since that time.  Patient is reporting centrally located lower abdominal pain in association with this penile pain and discharge.   Patient does report that he had an episode approximately 2 weeks ago where he had bloody discharge from his penis that resolved on its own, so he did not seek medical attention at that time.  Since that time patient is been noting small dark objects coming out of his urine.  Also, patient is reporting itching all over his body.  Patient denies any flank  pain, fever, chills, chest pain, shortness of breath, unhealing rashes, unhealing bruises, new onset weakness, new onset numbness, new onset tingling, headaches, change to vision, changes to hearing, sore throat, rhinorrhea, nasal congestion.     Patient has a history of prostate cancer approximately 10 years ago, and states he had seeds placed.    Also, patient states he had a pulmonary embolism diagnosed approximately 1 month ago and it was at this time that apixaban was started.  Patient states he was told that the reason for his pulmonary embolism was because of not enough exercise.  Patient states that he does not get up and move around very often, and spends most of his time watching TV.  States that he does not get up and move around very much because of weakness, and unsteadiness on his feet.  Lives in a 3 story building in [place].    Review of Systems   Constitutional: Negative for chills and fever.   HENT: Negative for congestion, nosebleeds and sore throat.    Eyes: Negative for pain and redness.   Respiratory: Positive for cough. Negative for sputum production and shortness of breath.    Cardiovascular: Negative for chest pain, palpitations and leg swelling.   Gastrointestinal: Positive for abdominal pain and nausea. Negative for diarrhea and vomiting.   Genitourinary:        Penile pain, and hematuria   Musculoskeletal: Negative for falls and neck pain.   Skin: Positive for itching. Negative for rash.   Neurological: Positive for weakness. Negative for dizziness, loss of consciousness and headaches.   Endo/Heme/Allergies: Positive for polydipsia. Does not bruise/bleed easily.   Psychiatric/Behavioral: Positive for substance abuse. Negative for memory loss.             Past Medical History (Chronic medical problem, known complications and current treatment)    Past Medical History:   Diagnosis Date   • Arrhythmia     afib   • Asthma     as a child   • Atrial fibrillation (HCC)    • Breath shortness    •  Cancer (HCC) 2006    prostate   • Chronic cough    • Dental disorder     dental implants, bottom dentures implanted, upper, click on   • Emphysema of lung (HCC)    • High cholesterol    • Psychiatric problem     anxiety and depression   • 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HCC)    • Tinnitus    • Urinary bladder disorder     history of UTI          Past Surgical History:  Past Surgical History:   Procedure Laterality Date   • ANGIOPLASTY  09/2017   • AAA WITH STENT GRAFT  2/15/2017    Procedure: AAA WITH STENT GRAFT FOR: ENDOVASCULAR REPAIR OF INFRARENAL ABDOMINAL AORTIC ANEURYSM USING TWO DOCKING LIMBS;  Surgeon: Calvin Paulino M.D.;  Location: SURGERY [facility];  Service:    • CYST EXCISION      scrotal   • OTHER      brachy therapy and radiation for prostate ca   • OTHER      dental implants       Current Outpatient Medications:  Home Medications     Reviewed by Emani Staples (Pharmacy Tech) on 06/26/19 at 2207  Med List Status: Complete   Medication Last Dose Status   acetaminophen (TYLENOL) 500 MG Tab 6/25/2019 Active   amiodarone (CORDARONE) 200 MG Tab 6/25/2019 Active   apixaban (ELIQUIS) 5mg Tab 6/25/2019 Active   atorvastatin (LIPITOR) 80 MG tablet 6/25/2019 Active   buPROPion SR (WELLBUTRIN-SR) 150 MG TABLET SR 12 HR sustained-release tablet 6/25/2019 Active   carvedilol (COREG) 6.25 MG Tab 6/25/2019 Active   ferrous sulfate 325 (65 Fe) MG tablet 6/26/2019 Active   finasteride (PROSCAR) 5 MG Tab 6/25/2019 Active   furosemide (LASIX) 40 MG Tab 6/25/2019 Active   HYDROcodone-acetaminophen (NORCO) 5-325 MG Tab per tablet 6/25/2019 Active   lisinopril (PRINIVIL) 5 MG Tab 6/25/2019 Active   mesalamine (LIALDA) 1.2 GM Tablet Delayed Response 6/25/2019 Active   phenazopyridine (PYRIDIUM) 100 MG Tab 6/25/2019 Active   potassium chloride SA (KDUR) 20 MEQ Tab CR 6/25/2019 Active   spironolactone (ALDACTONE) 25 MG Tab 6/25/2019 Active   tamsulosin (FLOMAX) 0.4 MG capsule 6/25/2019 Active   thiamine (THIAMINE) 100 MG Tab  "6/25/2019 Active                Medication Allergy/Sensitivities:  Allergies   Allergen Reactions   • Declomycin      Reaction a long time ago         Family History (mandatory)   Family History   Problem Relation Age of Onset   • Heart Disease Mother    • GI Sister    • Diabetes Sister      Family history reviewed with patient, and he denies further updates.    Social History (mandatory)   Social History     Social History   • Marital status:      Spouse name: N/A   • Number of children: N/A   • Years of education: N/A     Occupational History   • Not on file.     Social History Main Topics   • Smoking status: Current Every Day Smoker     Packs/day: 0.25     Years: 50.00     Types: Cigarettes   • Smokeless tobacco: Never Used   • Alcohol use 3.6 - 4.8 oz/week     6 - 8 Shots of liquor per week      Comment: 375ml per day fo [place]   • Drug use: Yes      Comment: daily marijuana   • Sexual activity: Not on file     Other Topics Concern   • Not on file     Social History Narrative   • No narrative on file   Tobacco: Patient states he smokes about 6 to 8 cigarettes/day.  Had recently quit, but now restarted.  Patient has an approximate 80-pack-year history.  EtOH: States that he quit a while ago.  Recreational/illicit drugs: Reports use of cannabis edibles, and a cannabis suppository?  Living situation: Lives in tracking a 3 story house.  PCP : Baldo Fajardo M.D.    Physical Exam     Vitals:    06/26/19 2230 06/27/19 0000 06/27/19 0156 06/27/19 0157   BP:  125/67     Pulse: (!) 110 (!) 124     Resp:  17     Temp:  37 °C (98.6 °F)     TempSrc:  Temporal     SpO2: 97% 96%     Weight:  56.2 kg (123 lb 14.4 oz) 56.2 kg (123 lb 14.4 oz) 56.2 kg (123 lb 14.4 oz)   Height:         Body mass index is 18.84 kg/m².  /67   Pulse (!) 124   Temp 37 °C (98.6 °F) (Temporal)   Resp 17   Ht 1.727 m (5' 8\")   Wt 56.2 kg (123 lb 14.4 oz)   SpO2 96%   BMI 18.84 kg/m²   O2 therapy: Pulse Oximetry: 96 %, O2 " (LPM): 0, O2 Delivery: None (Room Air)    Physical Exam   Constitutional: He is oriented to person, place, and time.   Well-developed, but thin appearing.   HENT:   Head: Normocephalic and atraumatic.   Mouth/Throat: No oropharyngeal exudate.   Eyes: Pupils are equal, round, and reactive to light. EOM are normal. No scleral icterus.   Neck: Normal range of motion.   Cardiovascular: Regular rhythm.  Exam reveals no gallop and no friction rub.    No murmur heard.  Tachycardia.   Pulmonary/Chest: Effort normal and breath sounds normal. No stridor. No respiratory distress. He has no wheezes. He has no rales.   Abdominal: He exhibits mass. There is tenderness. There is rebound and guarding.   Suprapubic abdomen is tense with underlying mass/fullness, and tender to palpation.   Genitourinary: Discharge found.   Genitourinary Comments: Maroon bloody discharge from penis during exam.   Musculoskeletal: Normal range of motion. He exhibits no edema or tenderness.   Lymphadenopathy:     He has no cervical adenopathy.   Neurological: He is alert and oriented to person, place, and time. He exhibits normal muscle tone.   Skin: Skin is warm and dry. No rash noted. He is not diaphoretic. No erythema. No pallor.   Psychiatric: Mood, memory, affect and judgment normal.         Data Review       Old Records Request:   Deferred  Current Records review/summary: Completed    Lab Data Review:  Recent Results (from the past 24 hour(s))   CBC WITH DIFFERENTIAL    Collection Time: 06/26/19  6:00 PM   Result Value Ref Range    WBC 14.5 (H) 4.8 - 10.8 K/uL    RBC 2.69 (L) 4.70 - 6.10 M/uL    Hemoglobin 8.4 (L) 14.0 - 18.0 g/dL    Hematocrit 27.4 (L) 42.0 - 52.0 %    .9 (H) 81.4 - 97.8 fL    MCH 31.2 27.0 - 33.0 pg    MCHC 30.7 (L) 33.7 - 35.3 g/dL    RDW 58.3 (H) 35.9 - 50.0 fL    Platelet Count 353 164 - 446 K/uL    MPV 8.7 (L) 9.0 - 12.9 fL    Neutrophils-Polys 80.90 (H) 44.00 - 72.00 %    Lymphocytes 12.40 (L) 22.00 - 41.00 %     Monocytes 5.20 0.00 - 13.40 %    Eosinophils 0.40 0.00 - 6.90 %    Basophils 0.30 0.00 - 1.80 %    Immature Granulocytes 0.80 0.00 - 0.90 %    Nucleated RBC 0.00 /100 WBC    Neutrophils (Absolute) 11.71 (H) 1.82 - 7.42 K/uL    Lymphs (Absolute) 1.80 1.00 - 4.80 K/uL    Monos (Absolute) 0.75 0.00 - 0.85 K/uL    Eos (Absolute) 0.06 0.00 - 0.51 K/uL    Baso (Absolute) 0.04 0.00 - 0.12 K/uL    Immature Granulocytes (abs) 0.11 0.00 - 0.11 K/uL    NRBC (Absolute) 0.00 K/uL   Comp Metabolic Panel    Collection Time: 06/26/19  6:00 PM   Result Value Ref Range    Sodium 135 135 - 145 mmol/L    Potassium 6.2 (H) 3.6 - 5.5 mmol/L    Chloride 105 96 - 112 mmol/L    Co2 17 (L) 20 - 33 mmol/L    Anion Gap 13.0 (H) 0.0 - 11.9    Glucose 110 (H) 65 - 99 mg/dL    Bun 56 (H) 8 - 22 mg/dL    Creatinine 2.64 (H) 0.50 - 1.40 mg/dL    Calcium 8.8 8.5 - 10.5 mg/dL    AST(SGOT) 21 12 - 45 U/L    ALT(SGPT) 15 2 - 50 U/L    Alkaline Phosphatase 22 (L) 30 - 99 U/L    Total Bilirubin 0.3 0.1 - 1.5 mg/dL    Albumin 3.4 3.2 - 4.9 g/dL    Total Protein 6.8 6.0 - 8.2 g/dL    Globulin 3.4 1.9 - 3.5 g/dL    A-G Ratio 1.0 g/dL   PT/INR    Collection Time: 06/26/19  6:00 PM   Result Value Ref Range    PT 16.8 (H) 12.0 - 14.6 sec    INR 1.33 (H) 0.87 - 1.13   PTT    Collection Time: 06/26/19  6:00 PM   Result Value Ref Range    APTT 37.2 (H) 24.7 - 36.0 sec   COD - Adult (Type and Screen)    Collection Time: 06/26/19  6:00 PM   Result Value Ref Range    ABO Grouping Only O     Rh Grouping Only POS     Antibody Screen-Cod POS (A)     Component R       R7                  Red Blood Cells7    [id number]   selected     06/26/19   22:37      Product Type Red Blood Cells LR Pheresis     Dispense Status selected     Unit Number (Barcoded) [id number]     Product Code (Barcoded) M9998O57     Blood Type (Barcoded) 5100     Component R       R3                  Red Blood Cells3    [id number]   selected     06/26/19   22:37      Product Type Red Blood  Cells LR Pheresis     Dispense Status selected     Unit Number (Barcoded) [id number]     Product Code (Barcoded) U7750Y16     Blood Type (Barcoded) 5100    ESTIMATED GFR    Collection Time: 19  6:00 PM   Result Value Ref Range    GFR If  29 (A) >60 mL/min/1.73 m 2    GFR If Non  24 (A) >60 mL/min/1.73 m 2   ANTIBODY IDENTIFICATION    Collection Time: 19  6:00 PM   Result Value Ref Range    Antibody Id POS, anti-K (A)     Antibody Id POS, AntibodySpecificity:Undetermined (A)    RBC ANTIGEN TYPING, K1 (RAJESH)    Collection Time: 19  6:00 PM   Result Value Ref Range    RBC Antigen Typing, K1 NEG    MAGNESIUM    Collection Time: 19  6:00 PM   Result Value Ref Range    Magnesium 2.3 1.5 - 2.5 mg/dL   EKG    Collection Time: 19  9:42 PM   Result Value Ref Range    Report       [facility] Emergency Dept.    Test Date:  2019  Pt Name:    AMANDO CANO                Department: ER  MRN:        [id number]                      Room:       [facility]  Gender:     Male                         Technician: [id number]  :        1945                   Requested By:CHIVO MICHELLE  Order #:    [id number]                    Reading MD: CHIVO MICHELLE MD    Measurements  Intervals                                Axis  Rate:       102                          P:          73  SD:         188                          QRS:        57  QRSD:       88                           T:          70  QT:         336  QTc:        438    Interpretive Statements  SINUS TACHYCARDIA  LOW VOLTAGE IN FRONTAL LEADS  CONSIDER ANTEROSEPTAL INFARCT  BASELINE WANDER IN LEAD(S) I,II,aVR  Compared to ECG 2018 13:45:30  Electronically Signed On 2019 1:52:38 PDT by CHIVO MICHELLE MD     ACCU-CHEK GLUCOSE    Collection Time: 19 10:34 PM   Result Value Ref Range    Glucose - Accu-Ck 181 (H) 65 - 99 mg/dL   ABO Rh Confirm    Collection Time: 19  1:01 AM   Result Value Ref  Range    ABO Rh Confirm O POS    Basic Metabolic Panel    Collection Time: 06/27/19  1:01 AM   Result Value Ref Range    Sodium 135 135 - 145 mmol/L    Potassium 5.4 3.6 - 5.5 mmol/L    Chloride 108 96 - 112 mmol/L    Co2 15 (L) 20 - 33 mmol/L    Glucose 67 65 - 99 mg/dL    Bun 59 (H) 8 - 22 mg/dL    Creatinine 2.93 (H) 0.50 - 1.40 mg/dL    Calcium 9.0 8.5 - 10.5 mg/dL    Anion Gap 12.0 (H) 0.0 - 11.9   ESTIMATED GFR    Collection Time: 06/27/19  1:01 AM   Result Value Ref Range    GFR If  26 (A) >60 mL/min/1.73 m 2    GFR If Non African American 21 (A) >60 mL/min/1.73 m 2       Imaging/Procedures Review:    Independant Imaging Review: Completed  CT-RENAL COLIC EVALUATION(A/P W/O)   Final Result      Blood layers in the bladder with no source visualized. No urolithiasis or hydronephrosis. Uroepithelial tumor is possible. None are visualized by noncontrast CT. Bladder mass is of greatest concern although not distinctly seen      Status post prostate seed implants with apparent prior TURP procedure. 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      OUTSIDE IMAGES-DX CHEST   Final Result      OUTSIDE IMAGES-DX CHEST   Final Result      DX-CHEST-PORTABLE (1 VIEW)    (Results Pending)        EKG:   EKG Independent Review: Completed  QTc:438, HR: 102, Normal Sinus Rhythm, no ST/T changes    Records reviewed and summarized in current documentation :  No  [employer] teaching service handout given to patient:  No         Assessment/Plan     * Gross hematuria   Assessment & Plan    Patient presenting with gross hematuria, and episode of hematuria approximate 3 weeks ago.  Additionally, it sounds like patient has been passing blood clots intermittently since that first episode of hematuria 3 weeks ago.  Patient has a significant smoking history, and radiation  exposure after prostatic cancer treatment which are both risk factors for bladder cancer/injury.  CT scan is concerning for possible bladder cancer.    -Urology consulted in the emergency department.  -Cystoscopy in the a.m. of 27 June 2019 with urology.  -N.p.o. pending cystoscopy.  -Continuous bladder irrigation.  -Monitor hemoglobin and transfuse if less than 8.  -I discussed the risks and the benefits with the patient, and he verbalized understanding, and agreement to transfusions as needed.     Hyperkalemia   Assessment & Plan    Patient presenting to the emergency department calcium is 6.2.  EKG without peaked T waves.    -Received calcium gluconate 1 g in ED.  -Kayexalate ordered.  -Repeat BMP.     Ischemic cardiomyopathy- (present on admission)   Assessment & Plan    Patient with a substantial cardiac history.  With concurrent hyperkalemia and cardiac history patient will be admitted to telemetry for monitoring.     Anemia   Assessment & Plan    Secondary to gross hematuria.  As patient has significant cardiac history transfusion threshold will be 8 g/dL.    -Transfuse if hemoglobin less than 8.     COPD (chronic obstructive pulmonary disease) ([facility])- (present on admission)   Assessment & Plan    Patient denies any changes to his chronic cough.    -RT protocol.     TD (acute kidney injury) ([facility])- (present on admission)   Assessment & Plan    Likely secondary to obstruction from blood clots.    -Supportive management, and treat underlying cause.     Suprapubic pain, acute   Assessment & Plan    CT scan indicative of significant distention urinary bladder.  Suspect that patient is making blood clots which is causing obstruction.    -Continuous bladder irrigation.  -Hydromorphone 0.5 mg every 3 hours as needed.  -De-escalate pain medications as appropriate.         Anticipated Hospital stay:  >2 midnights        Quality Measures  Quality-Core Measures   Reviewed items::  EKG reviewed, Labs reviewed, Medications  reviewed and Radiology images reviewed  White catheter::  Gross Hematuria with Clots  DVT: Contraindicated.  Patient actively bleeding.  DVT prophylaxis - mechanical:  SCDs    PCP: ANDREY Juan MD

## 2019-06-27 NOTE — ASSESSMENT & PLAN NOTE
-Patient presenting with gross hematuria, and episode of hematuria approximate 3 weeks ago.  Additionally, it sounds like patient has been passing blood clots intermittently since that first episode of hematuria 3 weeks ago.  Patient has a significant smoking history, and radiation exposure after prostate cancer treatment which are both risk factors for bladder cancer/injury.   -Patient has a history of PE about 1 month ago and was on Eliquis  -CT scan is concerning for possible bladder cancer.  -Cystoscopy: multiple prostate varices that are friable and bleed easily. Cauterized with good hemostasis. Bladder free of tumors or other abnormalities   -Patient received continuous bladder irrigation until after cystoscopy when it was discontinued as hematuria was resolved  -Patient is able to void without any difficulty, PVR is 50ml  -Patient is medically stable to be discharged with outpatient follow-up with urology.  [facility] will contact patient to schedule appointment

## 2019-06-27 NOTE — CONSULTS
DATE OF SERVICE:  06/27/2019    REASON FOR CONSULTATION:  Prominent gross hematuria.    HISTORY OF PRESENT ILLNESS:  The patient is a 73-year-old gentleman.  He was   transferred from [facility].  He presented there with continuous   bleeding from his penis and gross hematuria.  He had a catheter placement with   irrigation of clots with clearing of his urination.  He gives a history of a   heavy smoking history.  He had radiation therapy including brachytherapy and   external beam radiation therapy 12 years ago for prostate cancer.  He recently   had a pulmonary embolism and for that, he is on Eliquis.  His past urologic   history is otherwise unremarkable.    PAST MEDICAL HISTORY:  Aortic aneurysm without rupture, atrial fibrillation,   hypertension, coronary artery disease, depression, chronic obstructive   pulmonary disease and hyperlipidemia.    PAST SURGICAL HISTORY:  Angioplasty, coronary stent placement, brachytherapy   and sigmoidoscopy.    PHYSICAL EXAMINATION:  GENERAL:  He appears older than his stated age.  He is in no distress.  LUNGS:  Clear.  CARDIAC:  Regular rate and rhythm.  ABDOMEN:  Soft, nondistended.  No masses.  GENITOURINARY:  Penis with catheter in place currently with clear urine,   previously grossly bloody.  Testicles palpably benign.    LABORATORY DATA:  Creatinine 2.67, which is an increase from baseline.    Hematocrit 28.8, potassium 5.7.    IMAGING:  CT scan with normal upper tracts, bladder is distended with clots,   evidence of brachytherapy seeds are seen.    ASSESSMENT:  Urinary retention, gross hematuria.  Likely, this is radiation   cystitis.  Given his smoking history and prior radiation therapy, is also a   significant risk for carcinoma of the bladder.    PLAN:  Plan will be for cystoscopy, evacuation of any tumor, coagulation of   abnormal blood vessels as he will be prone to recurrent bleeding with his   anticoagulation needs.  Risks include but not limited to  recurrent hematuria,   injury to the urethra, scarring, sepsis.  With these issues in mind, he does   wish to proceed as planned with cystoscopy, clot evacuation, fulguration of   bleeding vessels, resection of tumor if identified.       ____________________________________     MD KWAME Canales / SUKHJINDER    DD:  06/27/2019 06:34:36  DT:  06/27/2019 07:56:04    D#:  [id number]  Job#:  [id number]

## 2019-06-27 NOTE — OP REPORT
OPERATIVE NOTE:      Date: 6/27/2019    Patient ID:   Name:             Khai Munoz   YOB: 1945  Age:                 73 y.o.  male   MRN:               [id number]                                                                         PRE-OP DIAGNOSIS: Radiation cystitis with clot retention.        POST-OP DIAGNOSIS: Same            PROCEDURE: Procedure(s) and Anesthesia Type:     * CYSTOSCOPY, CLOT EVACUATION AND FULGERATION - General            SURGEON: Surgeon(s) and Role:     * Robert Morgan M.D. - Primary            ANESTHESIA: General            ESTIMATED BLOOD LOSS: 500 cc of old clot and bladder            DRAINS: 22 Grenadian three-way White                  SPECIMENS: None               COMPLICATIONS: None                   CONDITION: Stable            TECHNIQUE: Patient is brought to the operating room and given a general anesthetic.  He is placed in lithotomy position prepped and draped in sterile fashion.  Urethral meatus is dilated with sounds to 30 Grenadian.  26 Grenadian continuous-flow resectoscope is passed.  Normal anterior bulbar urethra.  Prostatic urethra is short with pale urothelium.  There are multiple prostate varices that are friable and bleed easily.  Bladder is entered.  500 cc of organized clot are able to be broken up and irrigated out.  With effort we are ultimately able to identify the position of the ureteral orifices on both sides.  Then identified the multiple varices in the trigone several which were bleeding actively.  These were cauterized with care to avoid injury to the ureter orifices.  The varices in the prostatic urethra are cauterized as well.  Now with good hemostasis we had a better opportunity to examine the rest of the bladder.  It was free of tumors or other abnormalities.  I did not see anything that appeared to be recurrent carcinoma and the prostate or bladder neck.  22 Grenadian three-way catheter was placed.  There is return of clear  irrigant.  He is sent to recovery in stable condition.

## 2019-06-27 NOTE — ED NOTES
Pt on call light requesting food and water, this RN explained to Pt need to wait for test results. Pt given phone to use to speak to family.

## 2019-06-27 NOTE — PROGRESS NOTES
Upon insertion of three way marinelli for CBI, the marinelli was not draining. It works to drain approximately 1400cc after repositioning, but ultimately stopped. I informed charge nurse, Alberto. He came and and did manual irrigations for well over a hour on the patient. Many clots were present, with a lot of blood. Eventually he was able to clear it enough for the CBI to properly run. CBI was running wide open and the drainage had a light red/pink tinge to it. Patient taken to OR at 0600.

## 2019-06-27 NOTE — ED NOTES
Met with pt at bedside . Pt provided a detailed list. Pt is not currently   Taking all the medications on the list. Pt did not take any medication  Today. No oral antibiotic use in last 14 days at home.  Home pharmacy [facility].

## 2019-06-27 NOTE — ED NOTES
Pt on call light requesting orange juice, explained to Pt waiting for test results. Will offer food and water after results are in and ERP okays.

## 2019-06-27 NOTE — PROGRESS NOTES
2 RN skin assessment with Marquis HART:  Patient has bloody discharge coming from his penis. Skin otherwise WNL and intact.

## 2019-06-27 NOTE — ANESTHESIA PREPROCEDURE EVALUATION
Relevant Problems   (+) COPD (chronic obstructive pulmonary disease) (HCC)       Physical Exam    Airway   Mallampati: II  TM distance: >3 FB  Neck ROM: full       Cardiovascular - normal exam  Rhythm: regular  Rate: normal  (-) murmur     Dental - normal exam         Pulmonary - normal exam  Breath sounds clear to auscultation     Abdominal    Neurological - normal exam         Other findings: Pt had orange juice at 500 this morning... i talked with dr leon and he wishes to proceed now as pt is bleeding... i told pt of the increased anesthesia riskas well, he desires to proceed.  Will rsi.          Anesthesia Plan    ASA 3 - emergent   ASA physical status 3 criteria: other (comment)ASA physical status emergent criteria: acute hemorrhage    Plan - general       Airway plan will be ETT  (Cardiomyopathy, copd, kidney injury. Pt mon and sedated in prep ga... i was with pt at all times.)      Induction: intravenous    Postoperative Plan: Postoperative administration of opioids is intended.    Pertinent diagnostic labs and testing reviewed    Informed Consent:    Anesthetic plan and risks discussed with patient.    Use of blood products discussed with: patient whom consented to blood products.

## 2019-06-27 NOTE — ED NOTES
Medicated Pt per MAR fpr pain, instructed Pt to not play with the meatus. Pt continue to touch and manipulate his penis despite instructions to leave it alone.

## 2019-06-27 NOTE — PROGRESS NOTES
Presents with bladder obstruction and hematuria. Likely due to prior radiation and being on Xeralto.  Likely clots in bladder. Has had po’s. Voiding some. Not in great distress. Will admit for observation and plan for cystoscopy in early am. Plan for fulguration bleeding vessels, dilation as required.

## 2019-06-28 ENCOUNTER — APPOINTMENT (OUTPATIENT)
Dept: CARDIOLOGY | Facility: MEDICAL CENTER | Age: 74
DRG: 654 | End: 2019-06-28
Attending: STUDENT IN AN ORGANIZED HEALTH CARE EDUCATION/TRAINING PROGRAM
Payer: MEDICARE

## 2019-06-28 PROBLEM — K64.8 INTERNAL HEMORRHOIDS: Status: ACTIVE | Noted: 2019-06-28

## 2019-06-28 LAB
ANION GAP SERPL CALC-SCNC: 11 MMOL/L (ref 0–11.9)
BASOPHILS # BLD AUTO: 0.3 % (ref 0–1.8)
BASOPHILS # BLD: 0.04 K/UL (ref 0–0.12)
BUN SERPL-MCNC: 47 MG/DL (ref 8–22)
CALCIUM SERPL-MCNC: 8.1 MG/DL (ref 8.5–10.5)
CHLORIDE SERPL-SCNC: 107 MMOL/L (ref 96–112)
CO2 SERPL-SCNC: 19 MMOL/L (ref 20–33)
CREAT SERPL-MCNC: 2.54 MG/DL (ref 0.5–1.4)
EOSINOPHIL # BLD AUTO: 0.17 K/UL (ref 0–0.51)
EOSINOPHIL NFR BLD: 1.2 % (ref 0–6.9)
ERYTHROCYTE [DISTWIDTH] IN BLOOD BY AUTOMATED COUNT: 60.3 FL (ref 35.9–50)
GLUCOSE SERPL-MCNC: 109 MG/DL (ref 65–99)
HCT VFR BLD AUTO: 23.6 % (ref 42–52)
HCT VFR BLD AUTO: 24.5 % (ref 42–52)
HCT VFR BLD AUTO: 28.2 % (ref 42–52)
HGB BLD-MCNC: 7.8 G/DL (ref 14–18)
HGB BLD-MCNC: 8.3 G/DL (ref 14–18)
HGB BLD-MCNC: 8.8 G/DL (ref 14–18)
IMM GRANULOCYTES # BLD AUTO: 0.07 K/UL (ref 0–0.11)
IMM GRANULOCYTES NFR BLD AUTO: 0.5 % (ref 0–0.9)
LV EJECT FRACT  99904: 60
LV EJECT FRACT MOD 2C 99903: 66.29
LV EJECT FRACT MOD 4C 99902: 54.92
LV EJECT FRACT MOD BP 99901: 61.41
LYMPHOCYTES # BLD AUTO: 0.92 K/UL (ref 1–4.8)
LYMPHOCYTES NFR BLD: 6.7 % (ref 22–41)
MCH RBC QN AUTO: 31.6 PG (ref 27–33)
MCHC RBC AUTO-ENTMCNC: 33.1 G/DL (ref 33.7–35.3)
MCV RBC AUTO: 95.5 FL (ref 81.4–97.8)
MONOCYTES # BLD AUTO: 1.08 K/UL (ref 0–0.85)
MONOCYTES NFR BLD AUTO: 7.9 % (ref 0–13.4)
NEUTROPHILS # BLD AUTO: 11.45 K/UL (ref 1.82–7.42)
NEUTROPHILS NFR BLD: 83.4 % (ref 44–72)
NRBC # BLD AUTO: 0 K/UL
NRBC BLD-RTO: 0 /100 WBC
PLATELET # BLD AUTO: 189 K/UL (ref 164–446)
PMV BLD AUTO: 9.1 FL (ref 9–12.9)
POTASSIUM SERPL-SCNC: 5 MMOL/L (ref 3.6–5.5)
RBC # BLD AUTO: 2.47 M/UL (ref 4.7–6.1)
SODIUM SERPL-SCNC: 137 MMOL/L (ref 135–145)
WBC # BLD AUTO: 13.7 K/UL (ref 4.8–10.8)

## 2019-06-28 PROCEDURE — A9270 NON-COVERED ITEM OR SERVICE: HCPCS | Performed by: STUDENT IN AN ORGANIZED HEALTH CARE EDUCATION/TRAINING PROGRAM

## 2019-06-28 PROCEDURE — 700102 HCHG RX REV CODE 250 W/ 637 OVERRIDE(OP): Performed by: STUDENT IN AN ORGANIZED HEALTH CARE EDUCATION/TRAINING PROGRAM

## 2019-06-28 PROCEDURE — 36415 COLL VENOUS BLD VENIPUNCTURE: CPT

## 2019-06-28 PROCEDURE — P9016 RBC LEUKOCYTES REDUCED: HCPCS

## 2019-06-28 PROCEDURE — 93306 TTE W/DOPPLER COMPLETE: CPT

## 2019-06-28 PROCEDURE — 700105 HCHG RX REV CODE 258

## 2019-06-28 PROCEDURE — 86922 COMPATIBILITY TEST ANTIGLOB: CPT | Mod: 91

## 2019-06-28 PROCEDURE — 93306 TTE W/DOPPLER COMPLETE: CPT | Mod: 26 | Performed by: INTERNAL MEDICINE

## 2019-06-28 PROCEDURE — 700102 HCHG RX REV CODE 250 W/ 637 OVERRIDE(OP): Performed by: HOSPITALIST

## 2019-06-28 PROCEDURE — 80048 BASIC METABOLIC PNL TOTAL CA: CPT

## 2019-06-28 PROCEDURE — 99233 SBSQ HOSP IP/OBS HIGH 50: CPT | Mod: GC | Performed by: HOSPITALIST

## 2019-06-28 PROCEDURE — 85014 HEMATOCRIT: CPT | Mod: 91

## 2019-06-28 PROCEDURE — 700105 HCHG RX REV CODE 258: Performed by: STUDENT IN AN ORGANIZED HEALTH CARE EDUCATION/TRAINING PROGRAM

## 2019-06-28 PROCEDURE — 770020 HCHG ROOM/CARE - TELE (206)

## 2019-06-28 PROCEDURE — 36430 TRANSFUSION BLD/BLD COMPNT: CPT

## 2019-06-28 PROCEDURE — A9270 NON-COVERED ITEM OR SERVICE: HCPCS | Performed by: HOSPITALIST

## 2019-06-28 PROCEDURE — 86902 BLOOD TYPE ANTIGEN DONOR EA: CPT

## 2019-06-28 PROCEDURE — 85018 HEMOGLOBIN: CPT

## 2019-06-28 PROCEDURE — 85025 COMPLETE CBC W/AUTO DIFF WBC: CPT

## 2019-06-28 RX ORDER — SODIUM CHLORIDE 9 MG/ML
INJECTION, SOLUTION INTRAVENOUS CONTINUOUS
Status: DISCONTINUED | OUTPATIENT
Start: 2019-06-28 | End: 2019-06-28

## 2019-06-28 RX ORDER — SODIUM CHLORIDE 9 MG/ML
INJECTION, SOLUTION INTRAVENOUS CONTINUOUS
Status: DISPENSED | OUTPATIENT
Start: 2019-06-28 | End: 2019-06-28

## 2019-06-28 RX ORDER — OMEPRAZOLE 20 MG/1
40 CAPSULE, DELAYED RELEASE ORAL 2 TIMES DAILY
Status: DISCONTINUED | OUTPATIENT
Start: 2019-06-28 | End: 2019-06-29 | Stop reason: HOSPADM

## 2019-06-28 RX ORDER — SODIUM CHLORIDE 9 MG/ML
INJECTION, SOLUTION INTRAVENOUS
Status: COMPLETED
Start: 2019-06-28 | End: 2019-06-28

## 2019-06-28 RX ORDER — SODIUM CHLORIDE, SODIUM LACTATE, POTASSIUM CHLORIDE, AND CALCIUM CHLORIDE .6; .31; .03; .02 G/100ML; G/100ML; G/100ML; G/100ML
500 INJECTION, SOLUTION INTRAVENOUS ONCE
Status: COMPLETED | OUTPATIENT
Start: 2019-06-28 | End: 2019-06-28

## 2019-06-28 RX ADMIN — OXYCODONE HYDROCHLORIDE AND ACETAMINOPHEN 1 TABLET: 5; 325 TABLET ORAL at 15:26

## 2019-06-28 RX ADMIN — OMEPRAZOLE 40 MG: 20 CAPSULE, DELAYED RELEASE ORAL at 08:38

## 2019-06-28 RX ADMIN — CARVEDILOL 6.25 MG: 6.25 TABLET, FILM COATED ORAL at 17:19

## 2019-06-28 RX ADMIN — FINASTERIDE 5 MG: 5 TABLET, FILM COATED ORAL at 17:19

## 2019-06-28 RX ADMIN — OXYCODONE HYDROCHLORIDE AND ACETAMINOPHEN 1 TABLET: 5; 325 TABLET ORAL at 04:27

## 2019-06-28 RX ADMIN — TAMSULOSIN HYDROCHLORIDE 0.8 MG: 0.4 CAPSULE ORAL at 17:19

## 2019-06-28 RX ADMIN — OXYCODONE HYDROCHLORIDE AND ACETAMINOPHEN 1 TABLET: 5; 325 TABLET ORAL at 08:38

## 2019-06-28 RX ADMIN — SODIUM CHLORIDE, POTASSIUM CHLORIDE, SODIUM LACTATE AND CALCIUM CHLORIDE 500 ML: 600; 310; 30; 20 INJECTION, SOLUTION INTRAVENOUS at 01:15

## 2019-06-28 RX ADMIN — AMIODARONE HYDROCHLORIDE 200 MG: 200 TABLET ORAL at 05:24

## 2019-06-28 RX ADMIN — Medication 100 MG: at 05:24

## 2019-06-28 RX ADMIN — SODIUM CHLORIDE 500 ML: 9 INJECTION, SOLUTION INTRAVENOUS at 04:21

## 2019-06-28 RX ADMIN — OMEPRAZOLE 40 MG: 20 CAPSULE, DELAYED RELEASE ORAL at 17:19

## 2019-06-28 RX ADMIN — ATORVASTATIN CALCIUM 80 MG: 80 TABLET, FILM COATED ORAL at 17:19

## 2019-06-28 RX ADMIN — MESALAMINE 1250 MG: 250 CAPSULE ORAL at 05:24

## 2019-06-28 RX ADMIN — BUPROPION HYDROCHLORIDE 150 MG: 150 TABLET, EXTENDED RELEASE ORAL at 05:24

## 2019-06-28 ASSESSMENT — ENCOUNTER SYMPTOMS
CHILLS: 0
BACK PAIN: 0
COUGH: 1
SORE THROAT: 0
DEPRESSION: 0
DOUBLE VISION: 0
BLOOD IN STOOL: 0
NAUSEA: 0
HEADACHES: 0
FLANK PAIN: 0
ABDOMINAL PAIN: 0
ORTHOPNEA: 0
FEVER: 0
SHORTNESS OF BREATH: 0
NERVOUS/ANXIOUS: 0
VOMITING: 0
DIZZINESS: 1
PALPITATIONS: 0
BLURRED VISION: 0
FOCAL WEAKNESS: 0
SEIZURES: 0
WEAKNESS: 1

## 2019-06-28 ASSESSMENT — PATIENT HEALTH QUESTIONNAIRE - PHQ9
1. LITTLE INTEREST OR PLEASURE IN DOING THINGS: NOT AT ALL
2. FEELING DOWN, DEPRESSED, IRRITABLE, OR HOPELESS: NOT AT ALL
SUM OF ALL RESPONSES TO PHQ9 QUESTIONS 1 AND 2: 0

## 2019-06-28 NOTE — PROGRESS NOTES
Assumed care at 0700, bedside report received from day shift RN. Pt. Is SR on the monitor. Initial assessment completed, orders reviewed, call light within reach, and hourly rounding in place. POC addressed with patient, no additional questions at this time.

## 2019-06-28 NOTE — PROGRESS NOTES
Dr. Simon, [facility], paged regarding patients consistently low blood pressures and the current one of 89/38. Orders received to continue to monitor and if patient becomes symptomatic or if the pressures drop to below 80/30 to call. Patient is currently asymptomatic with a stable heart rate and oxygen saturation. Will continue to monitor for any signs of bleeding in the marinelli catheter and any signs of symptomatic hypotension.

## 2019-06-28 NOTE — CARE PLAN
PLAN:  1. Take Glipizide before meals (currently taking Glipizide after meals at inconsistent times)  2. Modify breakfast - half bagel or whole grain english muffin, 1/2 serving fruit  3. Consider add GLP-1 in place of Januvia in the future  4. Do some post-meal BG checks after breakfast     Problem: Safety  Goal: Will remain free from falls  Fall precautions in place. Bed in lowest position. Non-skid socks in place. Personal possessions within reach. Mobility sign on door. Bed-alarm on. Call light within reach. Pt educated regarding fall prevention and states understanding.     Problem: Knowledge Deficit  Goal: Knowledge of disease process/condition, treatment plan, diagnostic tests, and medications will improve  Pt educated regarding plan of care and medications. All questions answered.

## 2019-06-28 NOTE — PROGRESS NOTES
Handoff received from previous nurse Cedillo at the patients bedside. Patient was resting comfortably and the bed was in a low and locked position. Call light in reach. Needs met at this time.

## 2019-06-28 NOTE — PROGRESS NOTES
"Urology Progress Note    Post op Day # 1    CYSTOSCOPY, CLOT EVACUATION AND FULGERATION     Radiation cystitis with clot retention.   Followed by Urologist in [facility]          Interval Events: CBI off since yesterday afternoon- White with clear urine.       S   Positive for malaise/fatigue. Negative for chills and fever.    Respiratory: Positive for cough (chronic). Negative for shortness of breath.    Cardiovascular: Negative for chest pain, palpitations, orthopnea and leg swelling.   Gastrointestinal: Negative for abdominal pain, blood in stool, nausea and vomiting.   Genitourinary:hematuria (resolved after CBI). Negative for dysuria, flank pain and frequency.   Musculoskeletal: Negative for back pain and joint pain.   Skin: Negative for rash.   Neurological: Positive for dizziness and weakness. Negative for focal weakness, seizures and headaches.   Psychiatric/Behavioral: Negative for depression.     O:   /58   Pulse 69   Temp 36.6 °C (97.8 °F) (Temporal)   Resp 16   Ht 1.727 m (5' 8\")   Wt 61.4 kg (135 lb 5.8 oz)   SpO2 95%   Recent Labs      06/26/19   1800  06/27/19   0101  06/28/19   0027   SODIUM  135  135  137   POTASSIUM  6.2*  5.4  5.0   CHLORIDE  105  108  107   CO2  17*  15*  19*   GLUCOSE  110*  67  109*   BUN  56*  59*  47*   CREATININE  2.64*  2.93*  2.54*   CALCIUM  8.8  9.0  8.1*     Recent Labs      06/27/19   0442  06/27/19   1801  06/28/19   0027  06/28/19   0802   WBC  25.5*  15.3*  13.7*   --    RBC  2.41*  2.58*  2.47*   --    HEMOGLOBIN  7.6*  8.2*  7.8*  8.3*   HEMATOCRIT  25.0*  24.7*  23.6*  24.5*   MCV  103.7*  95.7  95.5   --    MCH  31.5  31.8  31.6   --    MCHC  30.4*  33.2*  33.1*   --    RDW  59.5*  57.2*  60.3*   --    PLATELETCT  361  202  189   --    MPV  9.1  9.1  9.1   --          Intake/Output Summary (Last 24 hours) at 06/28/19 1404  Last data filed at 06/28/19 0715   Gross per 24 hour   Intake           744.67 ml   Output             1000 ml   Net          " -255.33 ml     Constitutional: He is oriented to person, place, and time.   Head: Normocephalic and atraumatic.   Mouth/Throat: No oropharyngeal exudate.   Eyes: Pupils are equal, round, and reactive to light. EOM are normal.   Neck: Normal range of motion. Neck supple.   Cardiovascular: Normal rate, regular rhythm and normal heart sounds.    Pulmonary/Chest: Effort normal and breath sounds normal. No respiratory distress. He has no wheezes.   Abdominal: Soft. Bowel sounds are normal. He exhibits no distension. There is no tenderness.   Genitourinary Comments: Marinelli: clear yellow urine  Musculoskeletal: Normal range of motion. He exhibits no edema.   Neurological: He is alert and oriented to person, place, and time. GCS score is 15.   Skin: Skin is warm and dry. There is pallor.   Psychiatric: Mood and affect normal.   Nursing note and vitals reviewed.    A/P:    Active Hospital Problems    Diagnosis   • Gross hematuria [R31.0]     Priority: High   • Hyperkalemia [E87.5]     Priority: High   • Anemia [D64.9]     Priority: High   • Hypotension due to hypovolemia [I95.89, E86.1]     Priority: High   • Ischemic cardiomyopathy [I25.5]     Priority: Medium   • Leucocytosis [D72.829]     Priority: Medium   • Acute renal failure superimposed on chronic kidney disease (HCC) [N17.9, N18.9]     Priority: Medium   • Suprapubic pain, acute [R10.2]     Priority: Low   • COPD (chronic obstructive pulmonary disease) (HCC) [J44.9]     Priority: Low   • Internal hemorrhoids [K64.8]       Stable.    IS  Hematuria resolved. DC marinelli catheter.  Routine bladder scansto check post void residuals.  Replace marinelli if pt retaining.  Daniel covington will contact his to arrange follow up care/appt. If pt chooses he may also follow up with his urologist instead.  Pt states he has appointment with Urologist in [place] sometime in July 2019.  Okay to dc when okay with medicine.  Daniel covington signing off.     Plan of care discussed and directed  by MD Morgan.       EULALIA Villarreal

## 2019-06-28 NOTE — DISCHARGE PLANNING
Care Transition Team Assessment     RN BOSSMAN met with patient at bedside. Patient lives with spouse and friends in a three story house. Patient uses cane and is able to manage steps throughout home. Patient drives self to appointments and was independent with all ADL's prior to admit. Patient has family in [place] that will provide ride home upon discharge.      Information Source  Orientation : Oriented x 4  Information Given By: Patient    Readmission Evaluation  Is this a readmission?: No    Elopement Risk  Legal Hold: No  Ambulatory or Self Mobile in Wheelchair: Yes  Disoriented: No  Psychiatric Symptoms: None  History of Wandering: No  Elopement this Admit: No  Vocalizing Wanting to Leave: No  Displays Behaviors, Body Language Wanting to Leave: No-Not at Risk for Elopement  Elopement Risk: Not at Risk for Elopement    Interdisciplinary Discharge Planning  Does Admitting Nurse Feel This Could be a Complex Discharge?: No  Primary Care Physician: Dr Fajardo  Lives with - Patient's Self Care Capacity: Significant Other  Patient or legal guardian wants to designate a caregiver (see row info): No  Support Systems: Spouse / Significant Other, Children  Housing / Facility: 3 Story House  Do You Take your Prescribed Medications Regularly: Yes  Able to Return to Previous ADL's: Yes  Prior Services: Unable To Determine At This Time  Patient Expects to be Discharged to:: Home  Assistance Needed: Unknown at this Time  Durable Medical Equipment: Other - Specify, Walker (cane)    Discharge Preparedness  What is your plan after discharge?:  (Home)  What are your discharge supports?: Spouse  Prior Functional Level: Ambulatory, Drives Self, Independent with Activities of Daily Living, Uses Cane  Difficulity with ADLs: None  Difficulity with IADLs: None    Functional Assesment  Prior Functional Level: Ambulatory, Drives Self, Independent with Activities of Daily Living, Uses Cane    Finances  Financial Barriers to Discharge:  No  Prescription Coverage: Yes    Vision / Hearing Impairment  Vision Impairment : Yes  Right Eye Vision: Impaired  Left Eye Vision: Impaired  Hearing Impairment : Yes ([place])  Hearing Impairment: Both Ears  Does Pt Need Special Equipment for the Hearing Impaired?: No    Advance Directive  Advance Directive?: None    Domestic Abuse  Have you ever been the victim of abuse or violence?: No  Physical Abuse or Sexual Abuse: No  Verbal Abuse or Emotional Abuse: No  Possible Abuse Reported to:: Not Applicable    Psychological Assessment  History of Substance Abuse: None  History of Psychiatric Problems: No    Discharge Risks or Barriers  Discharge risks or barriers?: No    Anticipated Discharge Information  Anticipated discharge disposition: Home  Discharge Address: [place] [address]  Discharge Contact Phone Number: [phone]

## 2019-06-28 NOTE — PROGRESS NOTES
Dr. Simon,  [facility], paged regarding patients dark and tarry bowel movement with associated bright red blood. The doctor came to bedside to see the patient. New orders received to bolus the patient 500mL of Lactated Ringers. Will continue to monitor patient for fluid overload since he does have a history of ischemic cardiomyopathy. Patient is currently asymptomatic.

## 2019-06-28 NOTE — CARE PLAN
Problem: Safety  Goal: Will remain free from injury  Outcome: PROGRESSING AS EXPECTED  Safety Measures in Place    Problem: Venous Thromboembolism (VTW)/Deep Vein Thrombosis (DVT) Prevention:  Goal: Patient will participate in Venous Thrombosis (VTE)/Deep Vein Thrombosis (DVT)Prevention Measures  Outcome: PROGRESSING AS EXPECTED

## 2019-06-28 NOTE — PROGRESS NOTES
2 RN skin check complete with TANNER Cedillo    · Devices in place Nasal Cannula and urethral catheter  · Skin assessed under devices Yes and red blanchable on ears and intact on penis  · Confirmed wounds found on none  · New potential wounds noted on none  · Wound consult: no  · Wound reported and pictures uploaded: no    Patients ears are red and blanchable (gray foam pads applied to cannula), buttocks are pick and blanchable with hemmorhoids present on anus, heels are red and boggy (elevated on pillows)    · The following interventions in place: Gray foam for nasal cannula, pillows for repositioning and keeping heels floated, patient turns self side to side.

## 2019-06-28 NOTE — PROGRESS NOTES
Internal Medicine Interval Note  Note Author: Zuhair Earl M.D.     Name Khai Munoz     1945   Age/Sex 73 y.o. male   MRN [id number]   Code Status DNAR/DNI     After 5PM or if no immediate response to page, please call for cross-coverage  Attending/Team:Dr. Alexandre/RODRIGO Red See Patient List for primary contact information  Call [phone] to page    1st Call - Day Intern (R1):   Dr. Mejia 2nd Call - Day Sr. Resident (R2/R3):   Dr. Earl     Reason for interval visit  (Principal Problem)   Hematuria  Anemia    Interval Problem Daily Status Update  (24 hours, problem oriented, brief subjective history, new lab/imaging data pertinent to that problem)   Mr Munoz, 73-year-old male presented with gross hematuria which started about 3 weeks ago, occurring intermittently but got worse the day prior to presentation.  Patient has a history of smoking and radiation exposure after prostate cancer treatment about 12 years ago.  Patient has a history of PE and was on Eliquis.  Urology was consulted, patient underwent cystoscopy which showed multiple prostatic varices that are friable and bleed easily.  Cauterized with good hemostasis.  Bladder was assessed, free of any tumors or other abnormalities.  Patient's hemoglobin dropped from 8.4-7.6 but appeared much pale after cystoscopy.  Due to significant cardiac history, transfusion threshold for this patient is less than 8 g/dL.    - One episode of bloody bowel m/m last night. Pt got 1 unit PRBC for Hb < 8 & 500 cc RL bolus last night.   - No complaints on AM round.   - No grossHematuria on White.   - Hb this AM 8.3  - Pt stated that he is following up with GI physician from [place], underwent colonoscopy on Dec 2018, dx with IBS, UC & internal hemorroids. FOBT ordered. IF FOBT +ve , will consult GI.   - Pt was seen by  ( cardiology ) in that past, he is now following up with cardiologist from [place]. Discussed with Cardiology, will wait for ECHO to  consult officially.   - Recent PE on Eliquis ( started 1 mos ago ) which is on hold now with active bleeding requiring blood transfusion.   - Monitor with H & H Q 12 hr, transfuse Hb < 8.         Review of Systems   Constitutional: Positive for malaise/fatigue. Negative for chills and fever.   HENT: Negative for nosebleeds and sore throat.    Eyes: Negative for blurred vision and double vision.   Respiratory: Positive for cough (chronic). Negative for shortness of breath.    Cardiovascular: Negative for chest pain, palpitations, orthopnea and leg swelling.   Gastrointestinal: Negative for abdominal pain, blood in stool, nausea and vomiting.   Genitourinary: Positive for hematuria (resolved after CBI). Negative for dysuria, flank pain and frequency.   Musculoskeletal: Negative for back pain and joint pain.   Skin: Negative for rash.   Neurological: Positive for dizziness and weakness. Negative for focal weakness, seizures and headaches.   Psychiatric/Behavioral: Negative for depression. The patient is not nervous/anxious.      Disposition/Barriers to discharge:   Remain inpatient for stabilization and blood transfusion    Consultants/Specialty  PCP: Baldo Fajardo M.D.    Quality Measures  Quality-Core Measures   Reviewed items::  Labs reviewed and Medications reviewed  White catheter::  Gross Hematuria with Clots  DVT prophylaxis pharmacological::  Contraindicated - High bleeding risk  DVT prophylaxis - mechanical:  SCDs    Physical Exam       Vitals:    06/28/19 0430 06/28/19 0530 06/28/19 0715 06/28/19 0839   BP: (!) 98/42 104/48 (!) 97/43 116/84   Pulse: 70 75 79 83   Resp: 18 18 18 18   Temp: 36.7 °C (98.1 °F) 36.8 °C (98.2 °F) 37.2 °C (98.9 °F) 36.4 °C (97.5 °F)   TempSrc: Temporal Temporal Temporal Temporal   SpO2: 95% 95% 94% 95%   Weight:       Height:         Body mass index is 20.58 kg/m². Weight: 61.4 kg (135 lb 5.8 oz)  Oxygen Therapy:  Pulse Oximetry: 95 %, O2 (LPM): 2, O2 Delivery: Silicone Nasal  Cannula    Physical Exam   Constitutional: He is oriented to person, place, and time.   Pale   HENT:   Head: Normocephalic and atraumatic.   Mouth/Throat: No oropharyngeal exudate.   Eyes: Pupils are equal, round, and reactive to light. EOM are normal.   Neck: Normal range of motion. Neck supple.   Cardiovascular: Normal rate, regular rhythm and normal heart sounds.    Pulmonary/Chest: Effort normal and breath sounds normal. No respiratory distress. He has no wheezes.   Abdominal: Soft. Bowel sounds are normal. He exhibits no distension. There is no tenderness.   Genitourinary:   Genitourinary Comments: White: no hematuria   Musculoskeletal: Normal range of motion. He exhibits no edema.   Neurological: He is alert and oriented to person, place, and time. GCS score is 15.   Skin: Skin is warm and dry. There is pallor.   Psychiatric: Mood and affect normal.   Nursing note and vitals reviewed.      Assessment/Plan     * Gross hematuria   Assessment & Plan    -Patient presenting with gross hematuria, and episode of hematuria approximate 3 weeks ago.  Additionally, it sounds like patient has been passing blood clots intermittently since that first episode of hematuria 3 weeks ago.  Patient has a significant smoking history, and radiation exposure after prostate cancer treatment which are both risk factors for bladder cancer/injury.   -Patient has a history of PE about 1 month ago and was on Eliquis  -CT scan is concerning for possible bladder cancer.  -Cystoscopy: multiple prostate varices that are friable and bleed easily. Cauterized with good hemostasis. Bladder free of tumors or other abnormalities     Plan  -Advance diet  -D/C Continuous bladder irrigation since no hematuria.  -2 large-bore IV cannula  -Transfuse 2 units of blood on 6/27  - Pt got 1more  unit PRBC for Hb < 8 on 6/28  -Monitor hemoglobin and transfuse if less than 8.  -I discussed the risks and the benefits with the patient, and he verbalized  understanding, and agreement to transfusions as needed.     Hypotension due to hypovolemia   Assessment & Plan    -Pt had massive blood loss  -Low BP  - s/p Transfusion of 3 units of blood  - H & H Q 12 hr  - Transfuse if HGB < 8g/dl  - will CTM     Anemia   Assessment & Plan    -Secondary to gross hematuria.  As patient has significant cardiac history transfusion threshold will be 8 g/dL.  -Transfuse 2 units of blood on 6/27  - Pt got 1 unit PRBC for Hb < 8 on 6/28  - Monitor with H & H Q 12 H, will  transfuse Hb < 8.      Hyperkalemia   Assessment & Plan    -Patient presenting to the emergency department calcium is 6.2.  EKG without peaked T waves.  -Resolved  -Continue to monitor     Ischemic cardiomyopathy- (present on admission)   Assessment & Plan    Patient with a substantial cardiac history.  With concurrent hyperkalemia and cardiac history patient will be admitted to telemetry for monitoring.     Leucocytosis- (present on admission)   Assessment & Plan    - Pt denies any fever   - Urinalysis was normal  - Lactic acid 2.4 on admission, was not able to get repeat as pt is receiving blood. Will recheck after blood transfusion  - repeat lactic acid 1    Plan  - Ceftriaxone 1gram daily  - f/u blood and urine culture       Acute renal failure superimposed on chronic kidney disease (HCC)- (present on admission)   Assessment & Plan    -Likely secondary to obstruction from blood clots.  -Supportive management.  - Gentle IVF      Internal hemorrhoids- (present on admission)   Assessment & Plan    - PMHx of internal hemorrhoids per pt   - One episode of bloody bowel m/m last night.  - per pt & his wife, he has occasional bloody BM   - Pt stated that he is following up with GI physician from [place], underwent colonoscopy on Dec 2018, dx with IBS, UC & internal hemorroids.   - FOBT ordered. IF FOBT +ve , will consult GI.      Suprapubic pain, acute   Assessment & Plan    -Resolved after continuous bladder irrigation      COPD (chronic obstructive pulmonary disease) (HCC)- (present on admission)   Assessment & Plan    -Patient denies any changes to his chronic cough.  -RT per protocol.

## 2019-06-28 NOTE — ASSESSMENT & PLAN NOTE
- PMHx of internal hemorrhoids per pt   - One episode of bloody bowel movement on 6/28/2019  - Pt stated that he is following up with GI physician from [place], underwent colonoscopy on Dec 2018, dx with IBS, UC & internal hemorroids.   -FOBT positive, hemoglobin is stable, will follow up with GI as outpatient

## 2019-06-28 NOTE — PROGRESS NOTES
2 RN skin check complete with TANNER Mulligan.   Devices in place tele box, PIV, NC, and urethral catheter.  Skin assessed under devices yes.  Confirmed pressure ulcers found on n/a.  New potential pressure ulcers noted on n/a. Wound consult placed n/a.  The following interventions in place:  Encourage pt to turn and reposition frequently, gray pads with silicone O2 tubing in place, pillows in use for support and to float heels, barrier cream.     Bilat ears red/blanching, sacrum pink/blanching.   Bilat heels boggy.   Skin is intact.

## 2019-06-29 ENCOUNTER — PATIENT OUTREACH (OUTPATIENT)
Dept: HEALTH INFORMATION MANAGEMENT | Facility: OTHER | Age: 74
End: 2019-06-29

## 2019-06-29 VITALS
SYSTOLIC BLOOD PRESSURE: 127 MMHG | TEMPERATURE: 98.7 F | BODY MASS INDEX: 20.62 KG/M2 | HEART RATE: 66 BPM | WEIGHT: 136.02 LBS | RESPIRATION RATE: 14 BRPM | HEIGHT: 68 IN | OXYGEN SATURATION: 97 % | DIASTOLIC BLOOD PRESSURE: 64 MMHG

## 2019-06-29 PROBLEM — Z86.711 HISTORY OF PULMONARY EMBOLISM: Status: ACTIVE | Noted: 2019-06-29

## 2019-06-29 PROBLEM — N30.40 RADIATION CYSTITIS: Status: ACTIVE | Noted: 2019-06-27

## 2019-06-29 PROBLEM — N30.01 ACUTE HEMORRHAGIC CYSTITIS: Status: ACTIVE | Noted: 2017-09-04

## 2019-06-29 LAB
ANION GAP SERPL CALC-SCNC: 7 MMOL/L (ref 0–11.9)
BACTERIA UR CULT: ABNORMAL
BACTERIA UR CULT: ABNORMAL
BASOPHILS # BLD AUTO: 0.2 % (ref 0–1.8)
BASOPHILS # BLD: 0.02 K/UL (ref 0–0.12)
BUN SERPL-MCNC: 27 MG/DL (ref 8–22)
CALCIUM SERPL-MCNC: 8 MG/DL (ref 8.5–10.5)
CHLORIDE SERPL-SCNC: 108 MMOL/L (ref 96–112)
CO2 SERPL-SCNC: 19 MMOL/L (ref 20–33)
CREAT SERPL-MCNC: 1.61 MG/DL (ref 0.5–1.4)
EOSINOPHIL # BLD AUTO: 0.25 K/UL (ref 0–0.51)
EOSINOPHIL NFR BLD: 2.7 % (ref 0–6.9)
ERYTHROCYTE [DISTWIDTH] IN BLOOD BY AUTOMATED COUNT: 59.2 FL (ref 35.9–50)
GLUCOSE SERPL-MCNC: 101 MG/DL (ref 65–99)
HCT VFR BLD AUTO: 27.5 % (ref 42–52)
HEMOCCULT STL QL: POSITIVE
HGB BLD-MCNC: 8.9 G/DL (ref 14–18)
IMM GRANULOCYTES # BLD AUTO: 0.06 K/UL (ref 0–0.11)
IMM GRANULOCYTES NFR BLD AUTO: 0.6 % (ref 0–0.9)
LYMPHOCYTES # BLD AUTO: 0.97 K/UL (ref 1–4.8)
LYMPHOCYTES NFR BLD: 10.3 % (ref 22–41)
MCH RBC QN AUTO: 31.1 PG (ref 27–33)
MCHC RBC AUTO-ENTMCNC: 32.4 G/DL (ref 33.7–35.3)
MCV RBC AUTO: 96.2 FL (ref 81.4–97.8)
MONOCYTES # BLD AUTO: 0.89 K/UL (ref 0–0.85)
MONOCYTES NFR BLD AUTO: 9.4 % (ref 0–13.4)
NEUTROPHILS # BLD AUTO: 7.23 K/UL (ref 1.82–7.42)
NEUTROPHILS NFR BLD: 76.8 % (ref 44–72)
NRBC # BLD AUTO: 0 K/UL
NRBC BLD-RTO: 0 /100 WBC
PLATELET # BLD AUTO: 173 K/UL (ref 164–446)
PMV BLD AUTO: 9.3 FL (ref 9–12.9)
POTASSIUM SERPL-SCNC: 4.7 MMOL/L (ref 3.6–5.5)
RBC # BLD AUTO: 2.86 M/UL (ref 4.7–6.1)
SIGNIFICANT IND 70042: ABNORMAL
SITE SITE: ABNORMAL
SODIUM SERPL-SCNC: 134 MMOL/L (ref 135–145)
SOURCE SOURCE: ABNORMAL
WBC # BLD AUTO: 9.4 K/UL (ref 4.8–10.8)

## 2019-06-29 PROCEDURE — A9270 NON-COVERED ITEM OR SERVICE: HCPCS | Performed by: HOSPITALIST

## 2019-06-29 PROCEDURE — A9270 NON-COVERED ITEM OR SERVICE: HCPCS | Performed by: STUDENT IN AN ORGANIZED HEALTH CARE EDUCATION/TRAINING PROGRAM

## 2019-06-29 PROCEDURE — 51798 US URINE CAPACITY MEASURE: CPT

## 2019-06-29 PROCEDURE — 700102 HCHG RX REV CODE 250 W/ 637 OVERRIDE(OP): Performed by: HOSPITALIST

## 2019-06-29 PROCEDURE — 80048 BASIC METABOLIC PNL TOTAL CA: CPT

## 2019-06-29 PROCEDURE — 85025 COMPLETE CBC W/AUTO DIFF WBC: CPT

## 2019-06-29 PROCEDURE — 700102 HCHG RX REV CODE 250 W/ 637 OVERRIDE(OP): Performed by: STUDENT IN AN ORGANIZED HEALTH CARE EDUCATION/TRAINING PROGRAM

## 2019-06-29 PROCEDURE — 82272 OCCULT BLD FECES 1-3 TESTS: CPT

## 2019-06-29 PROCEDURE — 700105 HCHG RX REV CODE 258: Performed by: STUDENT IN AN ORGANIZED HEALTH CARE EDUCATION/TRAINING PROGRAM

## 2019-06-29 PROCEDURE — 36415 COLL VENOUS BLD VENIPUNCTURE: CPT

## 2019-06-29 PROCEDURE — 700111 HCHG RX REV CODE 636 W/ 250 OVERRIDE (IP): Performed by: STUDENT IN AN ORGANIZED HEALTH CARE EDUCATION/TRAINING PROGRAM

## 2019-06-29 PROCEDURE — 99239 HOSP IP/OBS DSCHRG MGMT >30: CPT | Mod: GC | Performed by: HOSPITALIST

## 2019-06-29 RX ORDER — SULFAMETHOXAZOLE AND TRIMETHOPRIM 800; 160 MG/1; MG/1
1 TABLET ORAL 2 TIMES DAILY
Qty: 24 TAB | Refills: 0 | Status: SHIPPED | OUTPATIENT
Start: 2019-06-29 | End: 2019-07-11

## 2019-06-29 RX ADMIN — MESALAMINE 1250 MG: 250 CAPSULE ORAL at 06:24

## 2019-06-29 RX ADMIN — BUPROPION HYDROCHLORIDE 150 MG: 150 TABLET, EXTENDED RELEASE ORAL at 06:24

## 2019-06-29 RX ADMIN — Medication 100 MG: at 06:23

## 2019-06-29 RX ADMIN — ACETAMINOPHEN 650 MG: 325 TABLET, FILM COATED ORAL at 06:24

## 2019-06-29 RX ADMIN — AMIODARONE HYDROCHLORIDE 200 MG: 200 TABLET ORAL at 06:24

## 2019-06-29 RX ADMIN — CEFTRIAXONE SODIUM 1 G: 1 INJECTION, POWDER, FOR SOLUTION INTRAMUSCULAR; INTRAVENOUS at 06:23

## 2019-06-29 RX ADMIN — CARVEDILOL 6.25 MG: 6.25 TABLET, FILM COATED ORAL at 06:24

## 2019-06-29 RX ADMIN — OMEPRAZOLE 40 MG: 20 CAPSULE, DELAYED RELEASE ORAL at 06:23

## 2019-06-29 ASSESSMENT — ENCOUNTER SYMPTOMS
HEADACHES: 0
BLOOD IN STOOL: 0
NERVOUS/ANXIOUS: 0
SHORTNESS OF BREATH: 0
COUGH: 0
SEIZURES: 0
PALPITATIONS: 0
DEPRESSION: 0
DOUBLE VISION: 0
FOCAL WEAKNESS: 0
FLANK PAIN: 0
WEAKNESS: 1
SORE THROAT: 0
DIZZINESS: 0
CHILLS: 0
BLURRED VISION: 0
BACK PAIN: 0
NAUSEA: 0
VOMITING: 0
ORTHOPNEA: 0
FEVER: 0
ABDOMINAL PAIN: 0

## 2019-06-29 NOTE — PROGRESS NOTES
RN skin check complete with TANNER Knight.   Devices in place tele box, PIV, NC, and urethral catheter.  Skin assessed under devices yes.  Confirmed pressure ulcers found on n/a.  New potential pressure ulcers noted on n/a. Wound consult placed n/a.  The following interventions in place:  Encourage pt to turn and reposition frequently, gray pads with silicone O2 tubing in place, pillows in use for support and to float heels, barrier cream.      Bilat ears red/blanching, sacrum pink/blanching.   Bilat heels boggy.   Skin is intact.

## 2019-06-29 NOTE — CARE PLAN
Problem: Communication  Goal: The ability to communicate needs accurately and effectively will improve  Outcome: PROGRESSING AS EXPECTED    Intervention: [place] patient and significant other/support system to call light to alert staff of needs  Pt oriented to unit and call light system. Pt calls for assistance appropriately.  Intervention: Educate patient and significant other/support system about the plan of care, procedures, treatments, medications and allow for questions  Discussed daily POC with pt. Discussed general medication information. Verbalized understanding.      Problem: Pain Management  Goal: Pain level will decrease to patient's comfort goal  Outcome: PROGRESSING AS EXPECTED  Pt currently without complaints of discomfort. Pt currently at comfort function goal

## 2019-06-29 NOTE — DISCHARGE INSTRUCTIONS
Discharge Instructions    Discharged to home by car with friend. Discharged via wheelchair, hospital escort: Yes.  Special equipment needed: Not Applicable    Be sure to schedule a follow-up appointment with your primary care doctor or any specialists as instructed.     Discharge Plan:   Diet Plan: Discussed  Activity Level: Discussed  Smoking Cessation Offered: Patient Counseled  Confirmed Follow up Appointment: Patient to Call and Schedule Appointment  Confirmed Symptoms Management: Discussed  Medication Reconciliation Updated: Yes  Pneumococcal Vaccine Administered/Refused: Not given - Patient refused pneumococcal vaccine  Influenza Vaccine Indication: Indicated: Not available from distributor/    I understand that a diet low in cholesterol, fat, and sodium is recommended for good health. Unless I have been given specific instructions below for another diet, I accept this instruction as my diet prescription.   Other diet: Regular    Special Instructions: None    · Is patient discharged on Warfarin / Coumadin?   No       Hematuria, Adult  Hematuria is blood in your urine. It can be caused by a bladder infection, kidney infection, prostate infection, kidney stone, or cancer of your urinary tract. Infections can usually be treated with medicine, and a kidney stone usually will pass through your urine. If neither of these is the cause of your hematuria, further workup to find out the reason may be needed.  It is very important that you tell your health care provider about any blood you see in your urine, even if the blood stops without treatment or happens without causing pain. Blood in your urine that happens and then stops and then happens again can be a symptom of a very serious condition. Also, pain is not a symptom in the initial stages of many urinary cancers.  Follow these instructions at home:  · Drink lots of fluid, 3-4 quarts a day. If you have been diagnosed with an infection, cranberry juice  is especially recommended, in addition to large amounts of water.  · Avoid caffeine, tea, and carbonated beverages because they tend to irritate the bladder.  · Avoid alcohol because it may irritate the prostate.  · Take all medicines as directed by your health care provider.  · If you were prescribed an antibiotic medicine, finish it all even if you start to feel better.  · If you have been diagnosed with a kidney stone, follow your health care provider's instructions regarding straining your urine to catch the stone.  · Empty your bladder often. Avoid holding urine for long periods of time.  · After a bowel movement, women should cleanse front to back. Use each tissue only once.  · Empty your bladder before and after sexual intercourse if you are a female.  Contact a health care provider if:  · You develop back pain.  · You have a fever.  · You have a feeling of sickness in your stomach (nausea) or vomiting.  · Your symptoms are not better in 3 days. Return sooner if you are getting worse.  Get help right away if:  · You develop severe vomiting and are unable to keep the medicine down.  · You develop severe back or abdominal pain despite taking your medicines.  · You begin passing a large amount of blood or clots in your urine.  · You feel extremely weak or faint, or you pass out.  This information is not intended to replace advice given to you by your health care provider. Make sure you discuss any questions you have with your health care provider.  Document Released: 12/18/2006 Document Revised: 05/25/2017 Document Reviewed: 08/18/2014  [employer] Interactive Patient Education © 2017 [employer]    Cystoscopy  Cystoscopy is a procedure that is used to help diagnose and sometimes treat conditions that affect that lower urinary tract. The lower urinary tract includes the bladder and the tube that drains urine from the bladder out of the body (urethra). Cystoscopy is performed with a thin, tube-shaped instrument  with a light and camera at the end (cystoscope). The cystoscope may be hard (rigid) or flexible, depending on the goal of the procedure. The cystoscope is inserted through the urethra, into the bladder.  Cystoscopy may be recommended if you have:  · Urinary tractinfections that keep coming back (recurring).  · Blood in the urine (hematuria).  · Loss of bladder control (urinary incontinence) or an overactive bladder.  · Unusual cells found in a urine sample.  · A blockage in the urethra.  · Painful urination.  · An abnormality in the bladder found during an intravenous pyelogram (IVP) or CT scan.  Cystoscopy may also be done to remove a sample of tissue to be examined under a microscope (biopsy).  Tell a health care provider about:  · Any allergies you have.  · All medicines you are taking, including vitamins, herbs, eye drops, creams, and over-the-counter medicines.  · Any problems you or family members have had with anesthetic medicines.  · Any blood disorders you have.  · Any surgeries you have had.  · Any medical conditions you have.  · Whether you are pregnant or may be pregnant.  What are the risks?  Generally, this is a safe procedure. However, problems may occur, including:  · Infection.  · Bleeding.  · Allergic reactions to medicines.  · Damage to other structures or organs.  What happens before the procedure?  · Ask your health care provider about:  ¨ Changing or stopping your regular medicines. This is especially important if you are taking diabetes medicines or blood thinners.  ¨ Taking medicines such as aspirin and ibuprofen. These medicines can thin your blood. Do not take these medicines before your procedure if your health care provider instructs you not to.  · Follow instructions from your health care provider about eating or drinking restrictions.  · You may be given antibiotic medicine to help prevent infection.  · You may have an exam or testing, such as X-rays of the bladder, urethra, or  kidneys.  · You may have urine tests to check for signs of infection.  · Plan to have someone take you home after the procedure.  What happens during the procedure?  · To reduce your risk of infection, your health care team will wash or sanitize their hands.  · You will be given one or more of the following:  ¨ A medicine to help you relax (sedative).  ¨ A medicine to numb the area (local anesthetic).  · The area around the opening of your urethra will be cleaned.  · The cystoscope will be passed through your urethra into your bladder.  · Germ-free (sterile) fluid will flow through the cystoscope to fill your bladder. The fluid will stretch your bladder so that your surgeon can clearly examine your bladder walls.  · The cystoscope will be removed and your bladder will be emptied.  The procedure may vary among health care providers and hospitals.  What happens after the procedure?  · You may have some soreness or pain in your abdomen and urethra. Medicines will be available to help you.  · You may have some blood in your urine.  · Do not drive for 24 hours if you received a sedative.  This information is not intended to replace advice given to you by your health care provider. Make sure you discuss any questions you have with your health care provider.  Document Released: 12/15/2001 Document Revised: 04/27/2017 Document Reviewed: 11/03/2016  [employer] Interactive Patient Education © 2017 [employer]    Sulfamethoxazole; Trimethoprim, SMX-TMP tablets  What is this medicine?  SULFAMETHOXAZOLE; TRIMETHOPRIM or SMX-TMP (suhl fuh meth OK yeyo zohl; trye METH oh prim) is a combination of a sulfonamide antibiotic and a second antibiotic, trimethoprim. It is used to treat or prevent certain kinds of bacterial infections. It will not work for colds, flu, or other viral infections.  This medicine may be used for other purposes; ask your health care provider or pharmacist if you have questions.  COMMON BRAND NAME(S): Bacter-Aid  DS, Bactrim, Bactrim DS, Septra, Septra DS  What should I tell my health care provider before I take this medicine?  They need to know if you have any of these conditions:  -anemia  -asthma  -being treated with anticonvulsants  -if you frequently drink alcohol containing drinks  -kidney disease  -liver disease  -low level of folic acid or glucose-6-phosphate dehydrogenase  -poor nutrition or malabsorption  -porphyria  -severe allergies  -thyroid disorder  -an unusual or allergic reaction to sulfamethoxazole, trimethoprim, sulfa drugs, other medicines, foods, dyes, or preservatives  -pregnant or trying to get pregnant  -breast-feeding  How should I use this medicine?  Take this medicine by mouth with a full glass of water. Follow the directions on the prescription label. Take your medicine at regular intervals. Do not take it more often than directed. Do not skip doses or stop your medicine early.  Talk to your pediatrician regarding the use of this medicine in children. Special care may be needed. This medicine has been used in children as young as 2 months of age.  Overdosage: If you think you have taken too much of this medicine contact a poison control center or emergency room at once.  NOTE: This medicine is only for you. Do not share this medicine with others.  What if I miss a dose?  If you miss a dose, take it as soon as you can. If it is almost time for your next dose, take only that dose. Do not take double or extra doses.  What may interact with this medicine?  Do not take this medicine with any of the following medications:  -aminobenzoate potassium  -dofetilide  -metronidazole  This medicine may also interact with the following medications:  -ACE inhibitors like benazepril, enalapril, lisinopril, and ramipril  -birth control pills  -cyclosporine  -digoxin  -diuretics  -indomethacin  -medicines for diabetes  -methenamine  -methotrexate  -phenytoin  -potassium  supplements  -pyrimethamine  -sulfinpyrazone  -tricyclic antidepressants  -warfarin  This list may not describe all possible interactions. Give your health care provider a list of all the medicines, herbs, non-prescription drugs, or dietary supplements you use. Also tell them if you smoke, drink alcohol, or use illegal drugs. Some items may interact with your medicine.  What should I watch for while using this medicine?  Tell your doctor or health care professional if your symptoms do not improve. Drink several glasses of water a day to reduce the risk of kidney problems.  Do not treat diarrhea with over the counter products. Contact your doctor if you have diarrhea that lasts more than 2 days or if it is severe and watery.  This medicine can make you more sensitive to the sun. Keep out of the sun. If you cannot avoid being in the sun, wear protective clothing and use a sunscreen. Do not use sun lamps or tanning beds/booths.  What side effects may I notice from receiving this medicine?  Side effects that you should report to your doctor or health care professional as soon as possible:  -allergic reactions like skin rash or hives, swelling of the face, lips, or tongue  -breathing problems  -fever or chills, sore throat  -irregular heartbeat, chest pain  -joint or muscle pain  -pain or difficulty passing urine  -red pinpoint spots on skin  -redness, blistering, peeling or loosening of the skin, including inside the mouth  -unusual bleeding or bruising  -unusually weak or tired  -yellowing of the eyes or skin  Side effects that usually do not require medical attention (report to your doctor or health care professional if they continue or are bothersome):  -diarrhea  -dizziness  -headache  -loss of appetite  -nausea, vomiting  -nervousness  This list may not describe all possible side effects. Call your doctor for medical advice about side effects. You may report side effects to FDA at 8-738-FDA-8791.  Where should I  keep my medicine?  Keep out of the reach of children.  Store at room temperature between 20 to 25 degrees C (68 to 77 degrees F). Protect from light. Throw away any unused medicine after the expiration date.  NOTE: This sheet is a summary. It may not cover all possible information. If you have questions about this medicine, talk to your doctor, pharmacist, or health care provider.  © 2018 Elsevier/Gold Standard (2014-07-25 14:38:26)    Depression / Suicide Risk    As you are discharged from this [facility] facility, it is important to learn how to keep safe from harming yourself.    Recognize the warning signs:  · Abrupt changes in personality, positive or negative- including increase in energy   · Giving away possessions  · Change in eating patterns- significant weight changes-  positive or negative  · Change in sleeping patterns- unable to sleep or sleeping all the time   · Unwillingness or inability to communicate  · Depression  · Unusual sadness, discouragement and loneliness  · Talk of wanting to die  · Neglect of personal appearance   · Rebelliousness- reckless behavior  · Withdrawal from people/activities they love  · Confusion- inability to concentrate     If you or a loved one observes any of these behaviors or has concerns about self-harm, here's what you can do:  · Talk about it- your feelings and reasons for harming yourself  · Remove any means that you might use to hurt yourself (examples: pills, rope, extension cords, firearm)  · Get professional help from the community (Mental Health, Substance Abuse, psychological counseling)  · Do not be alone:Call your Safe Contact- someone whom you trust who will be there for you.  · Call your local CRISIS HOTLINE 787-3548 or 370-909-1617  · Call your local Children's Mobile Crisis Response Team [place] (069) 097-8175 or www.Grid2Home  · Call the toll free National Suicide Prevention Hotlines   · National Suicide Prevention Lifeline 180-054-HXOE  (3844)  · [facility] 800-SUICIDE (086-9542)

## 2019-06-29 NOTE — PROGRESS NOTES
Internal Medicine Interval Note  Note Author: Nguyen Mejia M.D.     Name Khai Munoz     1945   Age/Sex 73 y.o. male   MRN [id number]   Code Status DNAR/DNI     After 5PM or if no immediate response to page, please call for cross-coverage  Attending/Team:Dr. Alexandre/RODRIGO Red See Patient List for primary contact information  Call [phone] to page    1st Call - Day Intern (R1):   Dr. Mejia 2nd Call - Day Sr. Resident (R2/R3):   Dr. Earl     Reason for interval visit  (Principal Problem)   Hematuria  Anemia    Interval Problem Daily Status Update  (24 hours, problem oriented, brief subjective history, new lab/imaging data pertinent to that problem)   Mr Munoz, 73-year-old male presented with gross hematuria which started about 3 weeks ago, occurring intermittently but got worse the day prior to presentation.  Patient has a history of smoking and radiation exposure after prostate cancer treatment about 12 years ago.  Patient has a history of PE and was on Eliquis.  Urology was consulted, patient underwent cystoscopy which showed multiple prostatic varices that are friable and bleed easily.  Cauterized with good hemostasis.  Bladder was assessed, free of any tumors or other abnormalities.  Patient's hemoglobin dropped from 8.4-7.6 but appeared much pale after cystoscopy.  Due to significant cardiac history, transfusion threshold for this patient is less than 8 g/dL.    Overnight, patient denies any new complaints.  He still continues to have dark stools, fecal occult positive.  Patient states that this is not something new and that he has chronic dark stools and is following gastroenterologist in [place].  Last colonoscopy was in 2018 which showed hemorrhoids, ulcerative colitis and IBS.    Patient is off White catheter, not retaining any urine.  Postvoid residual volume 50 mL.  Patient had transthoracic echocardiogram which showed EF of 60%.      Discussed with the patient about  the risks and benefits of restarting Eliquis since patient is medically stable.  Patient has opted not to restart Eliquis and will follow up with his PCP/cardiologist as outpatient and will return to the ER if any worsening of symptoms    Review of Systems   Constitutional: Negative for chills, fever and malaise/fatigue.   HENT: Negative for nosebleeds and sore throat.    Eyes: Negative for blurred vision and double vision.   Respiratory: Negative for cough and shortness of breath.    Cardiovascular: Negative for chest pain, palpitations, orthopnea and leg swelling.   Gastrointestinal: Negative for abdominal pain, blood in stool, nausea and vomiting.   Genitourinary: Negative for dysuria, flank pain, frequency and hematuria.   Musculoskeletal: Negative for back pain and joint pain.   Skin: Negative for rash.   Neurological: Positive for weakness (Improving). Negative for dizziness, focal weakness, seizures and headaches.   Psychiatric/Behavioral: Negative for depression. The patient is not nervous/anxious.      Disposition/Barriers to discharge:   Patient is medically stable to be discharged with outpatient follow-up    Consultants/Specialty  PCP: Baldo Fajardo M.D.    Quality Measures  Quality-Core Measures   Reviewed items::  Labs reviewed and Medications reviewed  White catheter::  No White  DVT prophylaxis pharmacological::  Contraindicated - High bleeding risk  DVT prophylaxis - mechanical:  SCDs    Physical Exam       Vitals:    06/29/19 0000 06/29/19 0500 06/29/19 0800 06/29/19 1300   BP: 116/50 118/50 120/59 127/64   Pulse: 62 78 67 66   Resp: 18 18 13 14   Temp: 36.8 °C (98.3 °F) 36.9 °C (98.5 °F) 37.2 °C (98.9 °F) 37.1 °C (98.7 °F)   TempSrc: Temporal Temporal Temporal Temporal   SpO2: 98% 96% 95% 97%   Weight:       Height:         Body mass index is 20.68 kg/m². Weight: 61.7 kg (136 lb 0.4 oz)  Oxygen Therapy:  Pulse Oximetry: 97 %, O2 (LPM): 2, O2 Delivery: None (Room Air)    Physical Exam      Constitutional: He is oriented to person, place, and time.   HENT:   Head: Normocephalic and atraumatic.   Mouth/Throat: No oropharyngeal exudate.   Eyes: Pupils are equal, round, and reactive to light. EOM are normal.   Neck: Normal range of motion. Neck supple.   Cardiovascular: Normal rate, regular rhythm and normal heart sounds.    Pulmonary/Chest: Effort normal and breath sounds normal. No respiratory distress. He has no wheezes.   Abdominal: Soft. Bowel sounds are normal. He exhibits no distension. There is no tenderness.   Genitourinary:   Genitourinary Comments: PVR = 50ml   Musculoskeletal: Normal range of motion. He exhibits no edema.   Neurological: He is alert and oriented to person, place, and time. GCS score is 15.   Skin: Skin is warm and dry.   Psychiatric: Mood and affect normal.   Nursing note and vitals reviewed.      Assessment/Plan     * Gross hematuria   Assessment & Plan    -Patient presenting with gross hematuria, and episode of hematuria approximate 3 weeks ago.  Additionally, it sounds like patient has been passing blood clots intermittently since that first episode of hematuria 3 weeks ago.  Patient has a significant smoking history, and radiation exposure after prostate cancer treatment which are both risk factors for bladder cancer/injury.   -Patient has a history of PE about 1 month ago and was on Eliquis  -CT scan is concerning for possible bladder cancer.  -Cystoscopy: multiple prostate varices that are friable and bleed easily. Cauterized with good hemostasis. Bladder free of tumors or other abnormalities   -Patient received continuous bladder irrigation until after cystoscopy when it was discontinued as hematuria was resolved  -Patient is able to void without any difficulty, PVR is 50ml  -Patient is medically stable to be discharged with outpatient follow-up with urology.  [facility] will contact patient to schedule appointment     History of pulmonary embolism   Assessment &  Plan    -Patient has a history of pulmonary embolism about 1 month ago and was on Xarelto for 1 month.  Currently patient is completely asymptomatic  -Because of his pulmonary embolism was mainly sedentary life as per patient  -Discussed with the patient about risks and benefits on restarting Xarelto since his hematuria has resolved.  Patient has opted not to start on Xarelto and will follow up with PCP, cardiology as outpatient  -Patient is medically stable to be discharged with outpatient follow-up     Hypotension due to hypovolemia   Assessment & Plan    -Patient received a total of 3 units of blood transfusion  -Resolved     Anemia   Assessment & Plan    -Secondary to gross hematuria.  As patient has significant cardiac history transfusion threshold was 8 g/dL.  -Patient received 2 units of blood on 6/27  -Hemoglobin is stable at 8.9  -Patient is medically stable to be discharged with outpatient follow-up  -Return to the ER if any new bleeding     Acute hemorrhagic cystitis- (present on admission)   Assessment & Plan    -Patient presented with gross hematuria on Xarelto  - Pt denied any fever or systemic symptoms on presentation  - Urinalysis showed hematuria and increased WBC of 100  - Lactic acid 2.4 which trended down to 1 after fluid resuscitation and antibiotic  -Patient received 3 doses of ceftriaxone  -Follow-up urine culture grew E. Coli, pansensitive to many antibiotics  -Leukocytosis resolved    Plan  -Discontinue ceftriaxone  -Start trimethoprim sulfamethoxazole DS twice daily for 2 weeks  -Follow-up with PCP as outpatient     Hyperkalemia   Assessment & Plan    -Patient presenting to the emergency department calcium is 6.2.  EKG without peaked T waves.  -Resolved  -Patient was on potassium supplement.  Discontinue and follow-up with PCP as outpatient     Acute renal failure superimposed on chronic kidney disease (HCC)- (present on admission)   Assessment & Plan    - Improved  -Follow-up with PCP as  outpatient     Internal hemorrhoids- (present on admission)   Assessment & Plan    - PMHx of internal hemorrhoids per pt   - One episode of bloody bowel movement on 6/28/2019  - Pt stated that he is following up with GI physician from [place], underwent colonoscopy on Dec 2018, dx with IBS, UC & internal hemorroids.   -FOBT positive, hemoglobin is stable, will follow up with GI as outpatient     Suprapubic pain, acute   Assessment & Plan    -Resolved after continuous bladder irrigation     Ischemic cardiomyopathy- (present on admission)   Assessment & Plan    -Follow-up with cardiologist as outpatient     COPD (chronic obstructive pulmonary disease) (HCC)- (present on admission)   Assessment & Plan    -Patient denies any changes to his chronic cough.  -Follow-up with PCP as outpatient         KELSEA Mejia MD    I saw and examined the patient and discussed the management with the resident. I reviewed the resident's note and agree with the documented findings and plan of care.    Additional attending comments:   Patient has opted not to restart Eliquis and will follow up with his PCP/cardiologist as outpatient and will return to the ER if any worsening of symptoms

## 2019-06-29 NOTE — ASSESSMENT & PLAN NOTE
-Patient has a history of pulmonary embolism about 1 month ago and was on Xarelto for 1 month.  Currently patient is completely asymptomatic  -Because of his pulmonary embolism was mainly sedentary life as per patient  -Discussed with the patient about risks and benefits on restarting Xarelto since his hematuria has resolved.  Patient has opted not to start on Xarelto and will follow up with PCP, cardiology as outpatient  -Patient is medically stable to be discharged with outpatient follow-up

## 2019-06-29 NOTE — PROGRESS NOTES
Patient discharged home with friend. All personal belongings collected. IV access removed. Monitor removed, monitor room notified. Discharge instructions discussed. Medications reviewed; new medication printed education provided. Follow up appointments to be scheduled by patient, contact information provided. Patient escorted off unit via wheelchair with all personal belongings without incident.

## 2019-06-29 NOTE — PROGRESS NOTES
Assumed care of patient at bedside report from NOC RN. Updated on POC. Patient currently A & O x 4; on room air; up x1 assist, unsteady on feet with shuffling gait; without complaints of acute pain. Call light within reach. Whiteboard updated. Fall precautions in place. Bed locked and in lowest position. All questions answered. No other needs indicated at this time.

## 2019-06-29 NOTE — CARE PLAN
Problem: Safety  Goal: Will remain free from injury  Outcome: PROGRESSING AS EXPECTED      Problem: Bowel/Gastric:  Goal: Normal bowel function is maintained or improved  Outcome: PROGRESSING AS EXPECTED      Problem: Knowledge Deficit  Goal: Knowledge of the prescribed therapeutic regimen will improve  Outcome: PROGRESSING AS EXPECTED      Problem: Psychosocial Needs:  Goal: Level of anxiety will decrease  Outcome: PROGRESSING AS EXPECTED      Problem: Skin Integrity  Goal: Risk for impaired skin integrity will decrease  Outcome: PROGRESSING AS EXPECTED

## 2019-06-29 NOTE — DISCHARGE SUMMARY
Internal Medicine Discharge Summary  Note Author: Nguyen Mejia M.D.       Name Khai Munoz     1945   Age/Sex 73 y.o. male   MRN [id number]       Admit Date:  2019       Discharge Date:   2019    Service:   [facility] Internal Medicine Red Team  Attending Physician(s):   Dr. Alexandre       Senior Resident(s):   Dr. Earl  Ted Resident(s):   Dr. Mejia  PCP: Baldo Fajardo M.D.    Primary Diagnosis:   Radiation cystitis with clot retention    Secondary Diagnoses:                Principal Problem:    Radiation cystitis POA: Unknown  Active Problems:    Acute renal failure superimposed on chronic kidney disease (HCC) POA: Yes    Acute hemorrhagic cystitis POA: Yes    Hyperkalemia POA: Unknown    Anemia POA: Unknown    Hypotension due to hypovolemia POA: Unknown    History of pulmonary embolism POA: Unknown    COPD (chronic obstructive pulmonary disease) (HCC) POA: Yes    Ischemic cardiomyopathy POA: Yes    Suprapubic pain, acute POA: Unknown    Internal hemorrhoids POA: Yes  Resolved Problems:    * No resolved hospital problems. *      Hospital Summary (Brief Narrative):       Mr. Munoz, a pleasant 73-year-old male with past medical history of coronary artery disease, COPD, systolic heart failure, aortic aneurysm s/p repair, prostate cancer, recent pulmonary embolism on Eliquis, and significant history of tobacco smoking transferred from the [facility] with complaint of gross hematuria and penile pain.  Symptoms started 3-week prior to presentation and went on intermittently to the point that it became gross amount of day prior to presentation.  Continuous bladder irrigation was started, patient had lost excessive amount of blood and hemoglobin had dropped below 7.8.  Patient underwent urgent cystoscopy which showed multiple prostate versus that are friable and bled easily.  There were cauterized and good hemostasis was achieved.  Urinary bladder was evaluated which was  free of any tumors or abnormalities.     Patient received 3 units of blood transfusion over the course of this admission due to severe anemia.  His hospital course was also complicated with hypotension which was resolved after volume resuscitation.  Transfusion threshold was 8 due to significant cardiac history.  Patient has significantly improved since volume resuscitation and no longer feels weak.  Patient on admission had increased WBC on urinalysis with leukocytosis and elevated lactic acid.  Patient was started on ceftriaxone and received 3 doses of ceftriaxone from 6/26/2019 to 6/29/2019.  Urine culture grew E. coli pansensitive to antibiotics.  Ceftriaxone was discontinued and patient is discharged on Bactrim it for total of 14 days and will follow up with PCP and urologist    Of note patient had bloody bowel movement on 6/28/2019.  For patient, this is not something new and is following gastroenterology as an outpatient.  Patient has significant history of ulcerative colitis, IBS and hemorrhoids.  Patient gets about 20 bowel movements per day due to his UC.  Patient will follow up with gastroenterologist as an outpatient.    Discussed with the patient on restarting Eliquis.  Risks and benefits were discussed with the patient significantly and at this time patient is opted against restarting Eliquis.  Patient will follow up with his PCP/cardiologist as outpatient and discuss on anticoagulants      Patient /Hospital Summary (Details -- Problem Oriented) :          * Radiation cystitis   Assessment & Plan    -Patient presenting with gross hematuria, and episode of hematuria approximate 3 weeks ago.  Additionally, it sounds like patient has been passing blood clots intermittently since that first episode of hematuria 3 weeks ago.  Patient has a significant smoking history, and radiation exposure after prostate cancer treatment which are both risk factors for bladder cancer/injury.   -Patient has a history of PE  about 1 month ago and was on Eliquis  -CT scan is concerning for possible bladder cancer.  -Cystoscopy: multiple prostate varices that are friable and bleed easily. Cauterized with good hemostasis. Bladder free of tumors or other abnormalities   -Patient received continuous bladder irrigation until after cystoscopy when it was discontinued as hematuria was resolved  -Patient is able to void without any difficulty, PVR is 50ml  -Patient is medically stable to be discharged with outpatient follow-up with urology.  [facility] will contact patient to schedule appointment     History of pulmonary embolism   Assessment & Plan    -Patient has a history of pulmonary embolism about 1 month ago and was on Xarelto for 1 month.  Currently patient is completely asymptomatic  -Because of his pulmonary embolism was mainly sedentary life as per patient  -Discussed with the patient about risks and benefits on restarting Xarelto since his hematuria has resolved.  Patient has opted not to start on Xarelto and will follow up with PCP, cardiology as outpatient  -Patient is medically stable to be discharged with outpatient follow-up     Hypotension due to hypovolemia   Assessment & Plan    -Patient received a total of 3 units of blood transfusion  -Resolved     Anemia   Assessment & Plan    -Secondary to gross hematuria.  As patient has significant cardiac history transfusion threshold was 8 g/dL.  -Patient received 2 units of blood on 6/27  -Hemoglobin is stable at 8.9  -Patient is medically stable to be discharged with outpatient follow-up  -Return to the ER if any new bleeding     Hyperkalemia   Assessment & Plan    -Patient presenting to the emergency department calcium is 6.2.  EKG without peaked T waves.  -Resolved  -Patient was on potassium supplement.  Discontinue and follow-up with PCP as outpatient     Acute hemorrhagic cystitis   Assessment & Plan    -Patient presented with gross hematuria on Xarelto  - Pt denied any fever  or systemic symptoms on presentation  - Urinalysis showed hematuria and increased WBC of 100  - Lactic acid 2.4 which trended down to 1 after fluid resuscitation and antibiotic  -Patient received 3 doses of ceftriaxone  -Follow-up urine culture grew E. Coli, pansensitive to many antibiotics  -Leukocytosis resolved    Plan  -Discontinue ceftriaxone  -Start trimethoprim sulfamethoxazole DS twice daily for 2 weeks  -Follow-up with PCP as outpatient     Acute renal failure superimposed on chronic kidney disease (HCC)   Assessment & Plan    - Improved  -Follow-up with PCP as outpatient     Internal hemorrhoids   Assessment & Plan    - PMHx of internal hemorrhoids per pt   - One episode of bloody bowel movement on 6/28/2019  - Pt stated that he is following up with GI physician from [place], underwent colonoscopy on Dec 2018, dx with IBS, UC & internal hemorroids.   -FOBT positive, hemoglobin is stable, will follow up with GI as outpatient     Suprapubic pain, acute   Assessment & Plan    -Resolved after continuous bladder irrigation     Ischemic cardiomyopathy   Assessment & Plan    -Follow-up with cardiologist as outpatient     COPD (chronic obstructive pulmonary disease) ([facility])   Assessment & Plan    -Patient denies any changes to his chronic cough.  -Follow-up with PCP as outpatient       Consultants:     Urologist    Procedures:        Cystoscopy with clot evacuation and fulguration    Imaging/ Testing:      EC-ECHOCARDIOGRAM COMPLETE W/O CONT   Final Result      DX-CHEST-PORTABLE (1 VIEW)   Final Result      No acute cardiopulmonary process is seen.      Emphysematous changes.      Atherosclerotic plaque.      CT-RENAL COLIC EVALUATION(A/P W/O)   Final Result      Blood layers in the bladder with no source visualized. No urolithiasis or hydronephrosis. Uroepithelial tumor is possible. None are visualized by noncontrast CT. Bladder mass is of greatest concern although not distinctly seen      Status post prostate seed  implants with apparent prior TURP procedure. 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      OUTSIDE IMAGES-DX CHEST   Final Result          Discharge Medications:         Medication Reconciliation: Completed       Medication List      START taking these medications      Instructions   sulfamethoxazole-trimethoprim 800-160 MG tablet  Commonly known as:  BACTRIM DS   Take 1 Tab by mouth 2 times a day for 12 days.  Dose:  1 Tab        CONTINUE taking these medications      Instructions   acetaminophen 500 MG Tabs  Commonly known as:  TYLENOL   Take 500 mg by mouth every 6 hours as needed for Mild Pain.  Dose:  500 mg     amiodarone 200 MG Tabs  Commonly known as:  CORDARONE   Take 200 mg by mouth every day.  Dose:  200 mg     atorvastatin 80 MG tablet  Commonly known as:  LIPITOR   Take 80 mg by mouth every evening.  Dose:  80 mg     buPROPion  MG Tb12 sustained-release tablet  Commonly known as:  WELLBUTRIN-SR   Take 150 mg by mouth every day.  Dose:  150 mg     carvedilol 6.25 MG Tabs  Commonly known as:  COREG   Take 6.25 mg by mouth 2 times a day, with meals.  Dose:  6.25 mg     ferrous sulfate 325 (65 Fe) MG tablet   Take 325 mg by mouth every day.  Dose:  325 mg     finasteride 5 MG Tabs  Commonly known as:  PROSCAR   Take 5 mg by mouth every evening.  Dose:  5 mg     furosemide 40 MG Tabs  Commonly known as:  LASIX   Take 40 mg by mouth every day.  Dose:  40 mg     HYDROcodone-acetaminophen 5-325 MG Tabs per tablet  Commonly known as:  NORCO   Take 1 Tab by mouth every 6 hours as needed.  Dose:  1 Tab     lisinopril 5 MG Tabs  Commonly known as:  PRINIVIL   Take 5 mg by mouth every day.  Dose:  5 mg     mesalamine 1.2 GM Tbec  Commonly known as:  LIALDA   Take 1.2 g by mouth every morning with breakfast.  Dose:  1.2 g     phenazopyridine 100 MG  Tabs  Commonly known as:  PYRIDIUM   Take 100 mg by mouth 3 times a day as needed.  Dose:  100 mg     spironolactone 25 MG Tabs  Commonly known as:  ALDACTONE   Take 25 mg by mouth every day.  Dose:  25 mg     tamsulosin 0.4 MG capsule  Commonly known as:  FLOMAX   Take 0.8 mg by mouth every evening.  Dose:  0.8 mg     thiamine 100 MG Tabs  Commonly known as:  THIAMINE   Take 100 mg by mouth every day.  Dose:  100 mg        STOP taking these medications    apixaban 5mg Tabs  Commonly known as:  ELIQUIS     potassium chloride SA 20 MEQ Tbcr  Commonly known as:  Kdur          Disposition:   Home in [place]    Diet:   Cardiac    Activity:   As tolerated    Instructions:       The patient was instructed to return to the ER in the event of worsening symptoms. I have counseled the patient on the importance of compliance and the patient has agreed to proceed with all medical recommendations and follow up plan indicated above.   The patient understands that all medications come with benefits and risks. Risks may include permanent injury or death and these risks can be minimized with close reassessment and monitoring.        Primary Care Provider:    Baldo Fajardo M.D.  Discharge summary faxed to primary care provider:  Completed  Copy of discharge summary given to the patient: Completed      Follow up appointment details :      No future appointments.  Baldo Fajardo M.D.  [address] [facility] [address]  [phone]    In 1 week  Primary Care Provider     EULALIA Villarreal  [address] [facility] [address]  [phone]      Urology     Cardiologist     In 1 week      Gastroenterologist (GI)    In 1 week      Pending Studies:        None  Time spent on discharge day patient visit, preparing discharge paperwork and arranging for patient follow up.    Summary of follow up issues:   Follow-up with urologist for hematuria due to radiation cystitis  Follow-up with PCP and cardiologist for management of pulmonary  embolism.  Follow-up with gastroenterologist for stool Hemoccult positive    Discharge Time (Minutes) :    45 minutes  Hospital Course Type:  Inpatient Stay >2 midnights    Condition on Discharge: Stable  ______________________________________________________________________    Interval history/exam for day of discharge:    See progress notes      Most Recent Labs:    Lab Results   Component Value Date/Time    WBC 9.4 06/29/2019 04:23 AM    RBC 2.86 (L) 06/29/2019 04:23 AM    HEMOGLOBIN 8.9 (L) 06/29/2019 04:23 AM    HEMATOCRIT 27.5 (L) 06/29/2019 04:23 AM    MCV 96.2 06/29/2019 04:23 AM    MCH 31.1 06/29/2019 04:23 AM    MCHC 32.4 (L) 06/29/2019 04:23 AM    MPV 9.3 06/29/2019 04:23 AM    NEUTSPOLYS 76.80 (H) 06/29/2019 04:23 AM    LYMPHOCYTES 10.30 (L) 06/29/2019 04:23 AM    MONOCYTES 9.40 06/29/2019 04:23 AM    EOSINOPHILS 2.70 06/29/2019 04:23 AM    BASOPHILS 0.20 06/29/2019 04:23 AM      Lab Results   Component Value Date/Time    SODIUM 134 (L) 06/29/2019 04:23 AM    POTASSIUM 4.7 06/29/2019 04:23 AM    CHLORIDE 108 06/29/2019 04:23 AM    CO2 19 (L) 06/29/2019 04:23 AM    GLUCOSE 101 (H) 06/29/2019 04:23 AM    BUN 27 (H) 06/29/2019 04:23 AM    CREATININE 1.61 (H) 06/29/2019 04:23 AM      Lab Results   Component Value Date/Time    ALTSGPT 15 06/26/2019 06:00 PM    ASTSGOT 21 06/26/2019 06:00 PM    ALKPHOSPHAT 22 (L) 06/26/2019 06:00 PM    TBILIRUBIN 0.3 06/26/2019 06:00 PM    LIPASE 10 (L) 08/31/2017 06:00 PM    ALBUMIN 3.4 06/26/2019 06:00 PM    GLOBULIN 3.4 06/26/2019 06:00 PM    INR 1.33 (H) 06/26/2019 06:00 PM     Lab Results   Component Value Date/Time    PROTHROMBTM 16.8 (H) 06/26/2019 06:00 PM    INR 1.33 (H) 06/26/2019 06:00 PM

## 2019-06-29 NOTE — PROGRESS NOTES
Assumed care of patient from Day-shift RN. Patient currently on BSC voiding post cath removal and having a bowel movement. Patient alert and oriented. Patient has no complaints at this time.

## 2019-07-08 NOTE — DOCUMENTATION QUERY
[facility]                                                                       Query Response Note      PATIENT:               AMANDO CANO  ACCT #:                  [id number]  MRN:                     [id number]  :                      1945  ADMIT DATE:       2019 5:27 PM  DISCH DATE:        2019 3:29 PM  RESPONDING  PROVIDER #:        [id number]           QUERY TEXT:    Anemia due to unspecified blood loss is documented in the medical record.  Please specify the acuity of the blood loss.     NOTE:  If an appropriate response is not listed below, please respond with a new note.      The patient's Clinical Indicators include:  Patient admitted with hematuria, diagnosed with radiation cystitis.  Per progress notes:    Hb response to transfuse is inadequate, will monitor closely for any further blood loss including GI    Pt had massive blood loss    Anemia is secondary to gross hematuria    Patient received 2 units PRBCs    Query created by: Tosin Silver on 2019 6:43 AM    RESPONSE TEXT:    Unable to determine          Electronically signed by:  LEEANN PEACOCK MD 2019 6:31 PM

## 2019-07-29 ENCOUNTER — TELEPHONE (OUTPATIENT)
Dept: VASCULAR LAB | Facility: MEDICAL CENTER | Age: 74
End: 2019-07-29

## 2019-07-29 DIAGNOSIS — I71.40 ABDOMINAL AORTIC ANEURYSM (AAA) WITHOUT RUPTURE (HCC): ICD-10-CM

## 2019-07-30 NOTE — PROGRESS NOTES
"AO x 4, wife at bedside.  Groin dressing sites c/d/i, dry gauze with tegaderm.  + BLE pulses, feet warm.  Poor PO intake for breakfast, strawberry milkshake ordered instead by this RN per patient request.  Per patient +BM, small stated \"If you call that a BM\".  Patient declined further elaboration.  Up to restroom to void, self and steady.  IS in use, achieves 2000.  Plan of care discussed, questions answered, verbalized understanding.   " "[facility] Dermatology Note      Dermatology Problem List:  1. Symptomatic Scalp - itching,burning and tenderness with associated hair loss. Treated as LPP by outside derm. Punch biopsy x 2 for H&E (normal scalp) and nerve fiber studies 12/11/18 (low density of nerve fibers) compatible with a neuropathy    Continue gabapentin 6% daily    Continue 2% ketoconazole shampoo every other day     Continue allegra 180 mg daily     Start Derma-Smoothe/FS 1x weekly   -Previous tx: ILK, topical betamethasone    2. Itching under breasts  1. - Symptomatic scalp - low density of epidermal  nerves is indicative of  a neuropathy      Continue gabapentin 6% daily    Continue ketoconazole shampoo every other day     Continue allegra 180 mg daily    Start Derma-Smoothe/FS at a rate of 1x weekly     Continue Zoloft as prescribed by her PCP. In about 1 month, see if this has helped scalp symptoms. If not, consider adding oral gabapentin once nightly.    2. Inframammary itching -     Over-the-counter hydrocortisone and Lotrimin     Discontinue topical nystatin    Encounter Date: Jul 29, 2019    CC:  Chief Complaint   Patient presents with     Hair Loss     Yue is here today for a hair loss follow up- Yue states \"it's been hard putting some of my medications on my scalp since my surgery\".          History of Present Illness:  Ms. Yue Mancilla is a 66 year old female who presents as a follow-up for symptomatic scalp with associated hair loss. The patient was last seen on 4/8/19 when she was continued on her topical gabapentin, ketoconazole shampoo, daily allegra, and started on Dermasmooth. Today, she feels like her scalp is stable, like she has plateaued. She notes that after initiating gabapentin her scalp symptoms improved significantly; however she was hoping for further relief. She still experiences pangs of pain at random times of day on her scalp crown and in a band along the back of the scalp. Her hair loss " is stable. Since her last visit, she had rotator cuff surgery which makes it hard to apply topicals; therefore she has only tried the dermasmoothe 3x. She also has developed itching under her breasts; her PCP prescribed nystatin which she feels is not helping. She was also started on Zoloft 2 days ago by her PCP.    Past Medical History:   There is no problem list on file for this patient.    Past Medical History:   Diagnosis Date     DJD (degenerative joint disease)      Sleep apnea      No past surgical history on file.    Social History:  Patient reports that she has quit smoking. She has never used smokeless tobacco. She reports that she drinks alcohol. She reports that she does not use drugs.    Family History:  No family history on file.    Medications:  Current Outpatient Medications   Medication Sig Dispense Refill     Calcium-Magnesium-Vitamin D (CALCIUM 500 PO)        Celecoxib (CELEBREX PO)        Cholecalciferol (VITAMIN D PO)        Fluocinolone Acetonide Scalp (DERMA-SMOOTHE/FS SCALP) 0.01 % OIL oil Massage 1-2 caps full into the scalp and leave for 4 hours or overnight then shampoo. Do weekly as needed. 118 Bottle 3     gabapentin 6 % SOLN solution Apply 1 ml twice a day to affected scalp 60 mL 3     ketoconazole (NIZORAL) 2 % external shampoo        lidocaine (XYLOCAINE) 5 % external ointment        losartan (COZAAR) 50 MG tablet Take 50 mg by mouth       nystatin (MYCOSTATIN) 064824 UNIT/GM external cream Apply topically 2 times daily       Omeprazole (PRILOSEC PO)        vitamin B complex with vitamin C (VITAMIN  B COMPLEX) tablet Take 1 tablet by mouth daily       VITAMIN E EX        cyclobenzaprine (FLEXERIL) 10 MG tablet Take 1 tablet (10 mg) by mouth every 8 hours as needed for muscle spasms No driving a car or drinking alcohol for 8 hours after taking this medication. (Patient not taking: Reported on 12/11/2018) 20 tablet 0     Allergies   Allergen Reactions     Ceclor [Cefaclor]       Lisinopril Cough         Review of Systems:  -As per HPI. Difficulty raising left shoulder above a 90 degree angle.  -Constitutional: Otherwise feeling well today, in usual state of health.  -HEENT: Patient denies nonhealing oral sores.  -Skin: As above in HPI. No additional skin concerns.    Physical exam:  Vitals: /79 (BP Location: Right arm, Patient Position: Sitting, Cuff Size: Adult Regular)   Pulse 87   GEN: This is a well developed, well-nourished female in no acute distress, in a pleasant mood.    SKIN: Focused examination of the scalp and face was performed.  - Scalp: - Examination of the scalp revealed Shan part width of 1.1. There was some background erythema and scaling of the scalp noted. In comparison to prior photographs, her hair regrowth is quite stable with some mild regrowth on the temporal scalp.  - Eyelashes and eyebrows normal density.  - Under breasts: rare, subtle scattered pink papules and excoriations  -No other lesions of concern on areas examined.     Impression/Plan:  2. Symptomatic scalp - low density of epidermal  nerves is indicative of  a neuropathy      Continue gabapentin 6% daily    Continue ketoconazole shampoo every other day     Continue allegra 180 mg daily    Start Derma-Smoothe/FS at a rate of 1x weekly     Continue Zoloft as prescribed by her PCP. In about 1 month, see if this has helped scalp symptoms. If not, consider adding oral gabapentin once nightly.    2. Inframammary itching -     Over-the-counter hydrocortisone and Lotrimin     Discontinue topical nystatin      CC Referred Self, MD  No address on file on close of this encounter.  Follow-up in 3 months, earlier for new or changing lesions.     Staff Involved:  I,Louann Lua, saw and examined the patient in the presence of Dr. Hampton.    Staff Physician:  I was present with the medical student who participated in the service and in the documentation of the note. I have verified the history and  personally performed the physical exam and medical decision making. I agree with the assessment and plan of care as documented in the note.       Rachel Hampton MD  Professor and Chair  Department of Dermatology  [facility]: Phone: [phone], Fax:[phone]  [facility]: Phone: [phone], Fax: [phone]

## 2019-08-29 ENCOUNTER — TELEPHONE (OUTPATIENT)
Dept: VASCULAR LAB | Facility: MEDICAL CENTER | Age: 74
End: 2019-08-29

## 2019-08-29 NOTE — TELEPHONE ENCOUNTER
Left message for patient to call back and let me know where he would like to do his US-AORTA/ILIACS DUPLEX COMPLETE as Audiedith Devendra is unable to do this exam.

## 2019-09-19 ENCOUNTER — TELEPHONE (OUTPATIENT)
Dept: VASCULAR LAB | Facility: MEDICAL CENTER | Age: 74
End: 2019-09-19

## 2019-09-20 NOTE — TELEPHONE ENCOUNTER
Recent aortoiliac duplex report from [facility] reviewed in accordance with EVAR surveillance guideline  Shows stable stent graft without endoleak with mild interval decrease in size of residual aneurysm sac     Patient should follow-up with vascular surgery as needed or scheduled  We will defer further medical management to cardiology unless patient willing to come in for vascular medicine appointment     Anticipate repeat aortoiliac duplex in 2 years per institution guideline     Michael J. Bloch, MD  Vascular Care      CC:  BEBO Fajardo

## 2019-09-24 ENCOUNTER — TELEPHONE (OUTPATIENT)
Dept: VASCULAR LAB | Facility: MEDICAL CENTER | Age: 74
End: 2019-09-24

## 2019-09-24 NOTE — TELEPHONE ENCOUNTER
"Spoke with pt regarding recent duplex scan showing a stable EVAR.  He states he follows regularly with a \"specialist,\" in [place], [place], of an unknown name.  Offered f/u in vascular medicine, however, pt declines.  Will plan to rescan in 2yrs.      Bing Vargas, LILLY  [facility]    "

## 2021-06-14 ENCOUNTER — HOSPITAL ENCOUNTER (OUTPATIENT)
Dept: HOSPITAL 8 - CARD | Age: 76
Discharge: HOME | End: 2021-06-14
Attending: INTERNAL MEDICINE
Payer: MEDICARE

## 2021-06-14 DIAGNOSIS — I25.10: Primary | ICD-10-CM

## 2021-06-14 PROCEDURE — 93350 STRESS TTE ONLY: CPT

## 2021-06-14 PROCEDURE — 93017 CV STRESS TEST TRACING ONLY: CPT

## 2021-08-05 DIAGNOSIS — I71.40 ABDOMINAL ANEURYSM (HCC): ICD-10-CM

## 2021-08-11 ENCOUNTER — TELEPHONE (OUTPATIENT)
Dept: VASCULAR LAB | Facility: MEDICAL CENTER | Age: 76
End: 2021-08-11

## 2021-08-11 NOTE — TELEPHONE ENCOUNTER
[facility]    Pt asked to cancel his imaging with [facility] and asked that the order from 8/5/21 be sent to [facility].     Diego Downey, CristianD

## 2021-08-26 ENCOUNTER — DOCUMENTATION (OUTPATIENT)
Dept: VASCULAR LAB | Facility: MEDICAL CENTER | Age: 76
End: 2021-08-26

## 2021-08-26 NOTE — PROGRESS NOTES
Recent aortoiliac duplex report from [facility] reviewed in accordance with EVAR surveillance guideline  Shows stable stent graft without endoleak     It sounds like patient also had a CT scan that demonstrated stable evar as well - will request copy of report     We will defer further medical management to cardiology and/or pcp unless patient willing to come in for vascular medicine appointment     Anticipate repeat aortoiliac duplex in 2 years per institution guideline     Michael J. Bloch, MD  Vascular Care      CC:  JAIRO Fajardo

## 2022-11-02 ENCOUNTER — APPOINTMENT (OUTPATIENT)
Dept: RADIOLOGY | Facility: MEDICAL CENTER | Age: 77
End: 2022-11-02
Attending: EMERGENCY MEDICINE
Payer: MEDICARE

## 2022-11-02 ENCOUNTER — HOSPITAL ENCOUNTER (EMERGENCY)
Facility: MEDICAL CENTER | Age: 77
End: 2022-11-02
Attending: EMERGENCY MEDICINE
Payer: MEDICARE

## 2022-11-02 VITALS
HEART RATE: 60 BPM | TEMPERATURE: 97.7 F | RESPIRATION RATE: 20 BRPM | BODY MASS INDEX: 23.46 KG/M2 | SYSTOLIC BLOOD PRESSURE: 148 MMHG | HEIGHT: 67 IN | WEIGHT: 149.47 LBS | DIASTOLIC BLOOD PRESSURE: 78 MMHG | OXYGEN SATURATION: 96 %

## 2022-11-02 DIAGNOSIS — R42 DIZZINESS: ICD-10-CM

## 2022-11-02 LAB
ALBUMIN SERPL BCP-MCNC: 4.2 G/DL (ref 3.2–4.9)
ALBUMIN/GLOB SERPL: 1.1 G/DL
ALP SERPL-CCNC: 40 U/L (ref 30–99)
ALT SERPL-CCNC: 18 U/L (ref 2–50)
ANION GAP SERPL CALC-SCNC: 9 MMOL/L (ref 7–16)
AST SERPL-CCNC: 29 U/L (ref 12–45)
BASOPHILS # BLD AUTO: 0.9 % (ref 0–1.8)
BASOPHILS # BLD: 0.05 K/UL (ref 0–0.12)
BILIRUB SERPL-MCNC: 0.3 MG/DL (ref 0.1–1.5)
BUN SERPL-MCNC: 24 MG/DL (ref 8–22)
CALCIUM SERPL-MCNC: 9.3 MG/DL (ref 8.5–10.5)
CHLORIDE SERPL-SCNC: 102 MMOL/L (ref 96–112)
CO2 SERPL-SCNC: 24 MMOL/L (ref 20–33)
CREAT SERPL-MCNC: 1.57 MG/DL (ref 0.5–1.4)
EKG IMPRESSION: NORMAL
EOSINOPHIL # BLD AUTO: 0.3 K/UL (ref 0–0.51)
EOSINOPHIL NFR BLD: 5.1 % (ref 0–6.9)
ERYTHROCYTE [DISTWIDTH] IN BLOOD BY AUTOMATED COUNT: 56.8 FL (ref 35.9–50)
GFR SERPLBLD CREATININE-BSD FMLA CKD-EPI: 45 ML/MIN/1.73 M 2
GLOBULIN SER CALC-MCNC: 3.7 G/DL (ref 1.9–3.5)
GLUCOSE SERPL-MCNC: 149 MG/DL (ref 65–99)
HCT VFR BLD AUTO: 40 % (ref 42–52)
HGB BLD-MCNC: 13.1 G/DL (ref 14–18)
IMM GRANULOCYTES # BLD AUTO: 0.02 K/UL (ref 0–0.11)
IMM GRANULOCYTES NFR BLD AUTO: 0.3 % (ref 0–0.9)
LIPASE SERPL-CCNC: 20 U/L (ref 11–82)
LYMPHOCYTES # BLD AUTO: 1.01 K/UL (ref 1–4.8)
LYMPHOCYTES NFR BLD: 17.2 % (ref 22–41)
MCH RBC QN AUTO: 32 PG (ref 27–33)
MCHC RBC AUTO-ENTMCNC: 32.8 G/DL (ref 33.7–35.3)
MCV RBC AUTO: 97.6 FL (ref 81.4–97.8)
MONOCYTES # BLD AUTO: 0.41 K/UL (ref 0–0.85)
MONOCYTES NFR BLD AUTO: 7 % (ref 0–13.4)
NEUTROPHILS # BLD AUTO: 4.08 K/UL (ref 1.82–7.42)
NEUTROPHILS NFR BLD: 69.5 % (ref 44–72)
NRBC # BLD AUTO: 0 K/UL
NRBC BLD-RTO: 0 /100 WBC
PLATELET # BLD AUTO: 176 K/UL (ref 164–446)
PMV BLD AUTO: 10 FL (ref 9–12.9)
POTASSIUM SERPL-SCNC: 4.3 MMOL/L (ref 3.6–5.5)
PROT SERPL-MCNC: 7.9 G/DL (ref 6–8.2)
RBC # BLD AUTO: 4.1 M/UL (ref 4.7–6.1)
SODIUM SERPL-SCNC: 135 MMOL/L (ref 135–145)
WBC # BLD AUTO: 5.9 K/UL (ref 4.8–10.8)

## 2022-11-02 PROCEDURE — 93005 ELECTROCARDIOGRAM TRACING: CPT

## 2022-11-02 PROCEDURE — 36415 COLL VENOUS BLD VENIPUNCTURE: CPT

## 2022-11-02 PROCEDURE — 99283 EMERGENCY DEPT VISIT LOW MDM: CPT

## 2022-11-02 PROCEDURE — 80053 COMPREHEN METABOLIC PANEL: CPT

## 2022-11-02 PROCEDURE — 83690 ASSAY OF LIPASE: CPT

## 2022-11-02 PROCEDURE — 85025 COMPLETE CBC W/AUTO DIFF WBC: CPT

## 2022-11-02 PROCEDURE — 93005 ELECTROCARDIOGRAM TRACING: CPT | Performed by: EMERGENCY MEDICINE

## 2022-11-02 NOTE — ED PROVIDER NOTES
"ED Provider Note    CHIEF COMPLAINT  Chief Complaint   Patient presents with    Dizziness     Woke up at 8AM feeling \"dizzy\" unsteady when walking, & worsening dizziness when turning head. Went to bed at 8PM last night & felt OK. No unilateral neuro deficits, no nystagmus noted in triage. No CP, SOB, no fevers.        [facility]  Khai Munoz is a 77 y.o. male who presents with dizziness.  The patient states this started around 8 AM.  He attributes the dizziness to a new medication that he started for a neuropathy.  He started gabapentin this morning.  He states that he moves his head he describes vertigo.  He does not have any associated focal weakness.  Is not have a headache.  He states his symptoms have significantly improved and he has minimal vertigo at this time.  He has not had any recent vomiting or diarrhea.  He did not have any associated chest pain, palpitations, nor difficulty with breathing.  He does have a known history of chronic atrial fibrillation and he takes a daily baby aspirin.    REVIEW OF SYSTEMS  See HPI for further details. All other systems are negative.     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[facility])     Tinnitus     Urinary bladder disorder     history of UTI       FAMILY HISTORY  [unfilled]    SOCIAL HISTORY  Social History     Socioeconomic History    Marital status:    Tobacco Use    Smoking status: Every Day     Packs/day: 0.25     Years: 50.00     Pack years: 12.50     Types: Cigarettes    Smokeless tobacco: Never   Substance and Sexual Activity    Alcohol use: Yes     Alcohol/week: 3.6 - 4.8 oz     Types: 6 " "- 8 Shots of liquor per week     Comment: 375ml per day fo [place]    Drug use: Yes     Comment: daily marijuana       SURGICAL HISTORY  Past Surgical History:   Procedure Laterality Date    CYSTOSCOPY N/A 6/27/2019    Procedure: CYSTOSCOPY, CLOT EVACUATION AND FULGERATION;  Surgeon: Robert Morgan M.D.;  Location: SURGERY [facility];  Service: Urology    ANGIOPLASTY  09/2017    AAA WITH STENT GRAFT  2/15/2017    Procedure: AAA WITH STENT GRAFT FOR: ENDOVASCULAR REPAIR OF INFRARENAL ABDOMINAL AORTIC ANEURYSM USING TWO DOCKING LIMBS;  Surgeon: Calvin Paulino M.D.;  Location: SURGERY [facility];  Service:     CYST EXCISION      scrotal    OTHER      brachy therapy and radiation for prostate ca    OTHER      dental implants       CURRENT MEDICATIONS  Home Medications       Reviewed by Mary Anne Gay R.N. (Registered Nurse) on 11/02/22 at 1452  Med List Status: Not Addressed     Medication Last Dose Status   acetaminophen (TYLENOL) 500 MG Tab  Active   amiodarone (CORDARONE) 200 MG Tab  Active   atorvastatin (LIPITOR) 80 MG tablet  Active   buPROPion SR (WELLBUTRIN-SR) 150 MG TABLET SR 12 HR sustained-release tablet  Active   carvedilol (COREG) 6.25 MG Tab  Active   ferrous sulfate 325 (65 Fe) MG tablet  Active   finasteride (PROSCAR) 5 MG Tab  Active   furosemide (LASIX) 40 MG Tab  Active   HYDROcodone-acetaminophen (NORCO) 5-325 MG Tab per tablet  Active   lisinopril (PRINIVIL) 5 MG Tab  Active   mesalamine (LIALDA) 1.2 GM Tablet Delayed Response  Active   phenazopyridine (PYRIDIUM) 100 MG Tab  Active   spironolactone (ALDACTONE) 25 MG Tab  Active   tamsulosin (FLOMAX) 0.4 MG capsule  Active   thiamine (THIAMINE) 100 MG Tab  Active                    ALLERGIES  Allergies   Allergen Reactions    Declomycin      Reaction a long time ago       PHYSICAL EXAM  VITAL SIGNS: BP (!) 170/80   Pulse 65   Temp 36.7 °C (98.1 °F) (Temporal)   Resp 16   Ht 1.702 m (5' 7\")   Wt 67.8 kg (149 lb 7.6 oz)   " SpO2 97%   BMI 23.41 kg/m²       Constitutional: Well developed, Well nourished, No acute distress, Non-toxic appearance.   HENT: Normocephalic, Atraumatic, Bilateral external ears normal, Oropharynx moist, No oral exudates, Nose normal.   Eyes: PERRLA, EOMI, Conjunctiva normal, No discharge.   Neck: Normal range of motion, No tenderness, Supple, No stridor.   Lymphatic: No lymphadenopathy noted.   Cardiovascular: Normal heart rate, regular rhythm, No murmurs, No rubs, No gallops.   Thorax & Lungs: Normal breath sounds, No respiratory distress, No wheezing, No chest tenderness.   Abdomen: Bowel sounds normal, Soft, No tenderness, No masses, No pulsatile masses.   Skin: Warm, Dry, No erythema, No rash.   Back: No tenderness, No CVA tenderness.   Extremities: Intact distal pulses, No edema, No tenderness, No cyanosis, No clubbing.    Neurologic: Alert & oriented x 3, Normal motor function, Normal sensory function, No focal deficits noted.   Psychiatric: Affect normal, Judgment normal, Mood normal.     Results for orders placed or performed during the hospital encounter of 11/02/22   CBC WITH DIFFERENTIAL   Result Value Ref Range    WBC 5.9 4.8 - 10.8 K/uL    RBC 4.10 (L) 4.70 - 6.10 M/uL    Hemoglobin 13.1 (L) 14.0 - 18.0 g/dL    Hematocrit 40.0 (L) 42.0 - 52.0 %    MCV 97.6 81.4 - 97.8 fL    MCH 32.0 27.0 - 33.0 pg    MCHC 32.8 (L) 33.7 - 35.3 g/dL    RDW 56.8 (H) 35.9 - 50.0 fL    Platelet Count 176 164 - 446 K/uL    MPV 10.0 9.0 - 12.9 fL    Neutrophils-Polys 69.50 44.00 - 72.00 %    Lymphocytes 17.20 (L) 22.00 - 41.00 %    Monocytes 7.00 0.00 - 13.40 %    Eosinophils 5.10 0.00 - 6.90 %    Basophils 0.90 0.00 - 1.80 %    Immature Granulocytes 0.30 0.00 - 0.90 %    Nucleated RBC 0.00 /100 WBC    Neutrophils (Absolute) 4.08 1.82 - 7.42 K/uL    Lymphs (Absolute) 1.01 1.00 - 4.80 K/uL    Monos (Absolute) 0.41 0.00 - 0.85 K/uL    Eos (Absolute) 0.30 0.00 - 0.51 K/uL    Baso (Absolute) 0.05 0.00 - 0.12 K/uL    Immature  Granulocytes (abs) 0.02 0.00 - 0.11 K/uL    NRBC (Absolute) 0.00 K/uL   COMP METABOLIC PANEL   Result Value Ref Range    Sodium 135 135 - 145 mmol/L    Potassium 4.3 3.6 - 5.5 mmol/L    Chloride 102 96 - 112 mmol/L    Co2 24 20 - 33 mmol/L    Anion Gap 9.0 7.0 - 16.0    Glucose 149 (H) 65 - 99 mg/dL    Bun 24 (H) 8 - 22 mg/dL    Creatinine 1.57 (H) 0.50 - 1.40 mg/dL    Calcium 9.3 8.5 - 10.5 mg/dL    AST(SGOT) 29 12 - 45 U/L    ALT(SGPT) 18 2 - 50 U/L    Alkaline Phosphatase 40 30 - 99 U/L    Total Bilirubin 0.3 0.1 - 1.5 mg/dL    Albumin 4.2 3.2 - 4.9 g/dL    Total Protein 7.9 6.0 - 8.2 g/dL    Globulin 3.7 (H) 1.9 - 3.5 g/dL    A-G Ratio 1.1 g/dL   LIPASE   Result Value Ref Range    Lipase 20 11 - 82 U/L   ESTIMATED GFR   Result Value Ref Range    GFR (CKD-EPI) 45 (A) >60 mL/min/1.73 m 2   EKG (NOW)   Result Value Ref Range    Report       [facility] Emergency Dept.    Test Date:  2022  Pt Name:    AMANDO CANO                Department: ER  MRN:        [id number]                      Room:  Gender:     Male                         Technician: [id number]  :        [id number]                   Requested By:ER TRIAGE PROTOCOL  Order #:    [id number]                    Reading MD: MARQUIS HUTCHINS MD    Measurements  Intervals                                Axis  Rate:       60                           P:          13  KS:         201                          QRS:        10  QRSD:       97                           T:          64  QT:         446  QTc:        446    Interpretive Statements  STwelve-lead EKG shows a normal sinus rhythm with a ventricular rate of 60,  QRS  has poor R wave progression, no ST segment elevation, slight depression  laterally which does not appear acute from prior.  Electronically Signed On 2022 16:12:25 PDT by MARQUIS HUTCHINS MD         COURSE & MEDICAL DECISION MAKING  Pertinent Labs & Imaging studies reviewed. (See chart for details)  This a 77-year-old  male who presents the emerged part with dizziness.  He does describe vertigo.  However he is convinced this is from his gabapentin which could be the source.  He is otherwise neurologically intact.  Laboratory analysis shows stable renal insufficiency.  His anemia has improved from his baseline.  Clinically do not appreciate an infectious process.  The patient clinically does not appear dehydrated.  I ordered a CT scan to rule out an intracranial source and the patient does not want to have the CT scan.  He states his symptoms have completely resolved and would like to go home.  The patient was instructed return if he changes his mind.  I cannot rule out an intracranial source.  However the patient back to his baseline at this time he will be discharged with instructions to stay well-hydrated and return for any further symptoms.    FINAL IMPRESSION  1.  Dizziness  2.  Stable anemia  3.  Stable renal insufficiency    Disposition  Patient will be discharged in stable condition         Electronically signed by: Kelton Brasher M.D., 11/2/2022 4:10 PM

## 2022-11-02 NOTE — ED TRIAGE NOTES
"Khai Ed Alexander  77 y.o.  male  Chief Complaint   Patient presents with    Dizziness     Woke up at 8AM feeling \"dizzy\" unsteady when walking, & worsening dizziness when turning head. Went to bed at 8PM last night & felt OK. No unilateral neuro deficits, no nystagmus noted in triage. No CP, SOB, no fevers.      EKG done & labs sent. Pt denies headache. Patient educated on triage process, to alert staff if any changes in condition.'    "

## 2022-11-02 NOTE — ED NOTES
Pt back to red 4 via W/C @ this time. States dizziness started this a.m. also states he thinks he knows why. Pt states he recently started gabapentin and states he looked up side effects and states it is dizziness. Pt states he supposed to take it 3 times a day. Pt states he took all 3 at 0400 this a.m. and thinks that is why he was dizzy starting @ 0800 this am. Pt states dizziness is improving and he feels better now

## 2023-06-26 DIAGNOSIS — I71.40 ABDOMINAL ANEURYSM (HCC): ICD-10-CM

## 2023-12-19 ENCOUNTER — HOSPITAL ENCOUNTER (OUTPATIENT)
Dept: RADIOLOGY | Facility: MEDICAL CENTER | Age: 78
End: 2023-12-19
Attending: NURSE PRACTITIONER
Payer: MEDICARE

## 2023-12-19 DIAGNOSIS — I71.40 ABDOMINAL ANEURYSM (HCC): ICD-10-CM

## 2023-12-19 PROCEDURE — 93978 VASCULAR STUDY: CPT

## 2023-12-20 ENCOUNTER — DOCUMENTATION (OUTPATIENT)
Dept: VASCULAR LAB | Facility: MEDICAL CENTER | Age: 78
End: 2023-12-20
Payer: MEDICARE

## 2023-12-20 NOTE — PROGRESS NOTES
Imaging reviewed in accordance with institution TEVAR/EVAR guideline  This imaging performed approx 6 years after procedure    Review of u/s demonstrates stable endograft without endoleak.    Patient instructed to follow up with cardiology and PCP for medical management.    Anticipate repeat imaging with aortoiliac u/s in accordance with institution guideline    Michael Bloch, MD  Vascular Care:    Cc: JAIRO Fajardo

## 2023-12-21 ENCOUNTER — TELEPHONE (OUTPATIENT)
Dept: VASCULAR LAB | Facility: MEDICAL CENTER | Age: 78
End: 2023-12-21
Payer: MEDICARE

## 2023-12-21 NOTE — TELEPHONE ENCOUNTER
Michael J Bloch, M.D.  Mckenzie Null, JESSICA.  See my note  Shekhar u/s as done and repeat in 2 years  In the past patient has seen cards for medical management, but it doesn't look like he has seen them in awhile  Pls give patient a call and let them know that the EVAR looks stable, but we think he should follow up either with us or with cards for medical management. If he willing, you can set him up for next available initial with me. If he won't see either cards or us, direct him to follow up with pcp

## 2023-12-27 NOTE — TELEPHONE ENCOUNTER
Karolina Stubbs, Med Ass't  You6 days ago     MS  Patient is seeing a Cardiologist in [place] Bhaskar Jean Baptiste MD  Next F/u is in a Mar 2023.  Patient to get echo and has not scheduled.  Did not want to schedule with us at this time, I let him know his imaging EVAR results were stable and two yr repeat.

## 2025-01-09 NOTE — PROGRESS NOTES
Assumed pt care. Pt resting in bed. Plan of care discussed with pt. Verbalizes understanding. Bed in lowest position, locked, and call light within reach. SR on monitor.   Detail Level: Simple Quality 47: Advance Care Plan: Advance Care Planning discussed and documented in the medical record; patient did not wish or was not able to name a surrogate decision maker or provide an advance care plan. Quality 226: Preventive Care And Screening: Tobacco Use: Screening And Cessation Intervention: Patient screened for tobacco use and is an ex/non-smoker

## (undated) DEVICE — NEPTUNE 4 PORT MANIFOLD - (20/PK)

## (undated) DEVICE — TOWELS CLOTH SURGICAL - (4/PK 20PK/CA)

## (undated) DEVICE — SYRINGE 10 ML CONTROL LL (25EA/BX 4BX/CA)

## (undated) DEVICE — ELECTRODE BIPOLAR COAGULATION BALL YELLOW 24 FR (6EA/PK)

## (undated) DEVICE — SUTURE GENERAL

## (undated) DEVICE — SODIUM CHL IRRIGATION 0.9% 1000ML (12EA/CA)

## (undated) DEVICE — GLIDEWIRE ANGLED .035 X 180 (5EA/BX)

## (undated) DEVICE — JELLY, KY 2 0Z STERILE

## (undated) DEVICE — CATHETER FOLEY 22 FR 30 CC (12EA/CA)

## (undated) DEVICE — WATER IRRIG. STER 3000 ML - (4/CA)

## (undated) DEVICE — GOWN WARMING STANDARD FLEX - (30/CA)

## (undated) DEVICE — TUBE CONNECT SUCTION CLEAR 120 X 1/4" (50EA/CA)"

## (undated) DEVICE — PACK ENDO VASCULAR - (6/CA)

## (undated) DEVICE — GLOVE BIOGEL SZ 7 SURGICAL PF LTX - (50PR/BX 4BX/CA)

## (undated) DEVICE — GLOVE BIOGEL PI INDICATOR SZ 8.0 SURGICAL PF LF -(50/BX 4BX/CA)

## (undated) DEVICE — KIT ROOM DECONTAMINATION

## (undated) DEVICE — SHEET CARDIOVASCULAR - (4/CA)

## (undated) DEVICE — KIT ANESTHESIA W/CIRCUIT & 3/LT BAG W/FILTER (20EA/CA)

## (undated) DEVICE — ELECTRODE 850 FOAM ADHESIVE - HYDROGEL RADIOTRNSPRNT (50/PK)

## (undated) DEVICE — LACTATED RINGERS INJ 1000 ML - (14EA/CA 60CA/PF)

## (undated) DEVICE — GUIDEWIRES STARTER (PTFE COATED) J CURVED FIXED CORE 0.035 180CM 10 3 MM J FS

## (undated) DEVICE — SET EXTENSION WITH 2 PORTS (48EA/CA) ***PART #2C8610 IS A SUBSTITUTE*****

## (undated) DEVICE — CHLORAPREP 26 ML APPLICATOR - ORANGE TINT(25/CA)

## (undated) DEVICE — VESSELOOP MINI BLUE STERILE - SURG-I-LOOP (10EA/BX)

## (undated) DEVICE — SENSOR SPO2 NEO LNCS ADHESIVE (20/BX) SEE USER NOTES

## (undated) DEVICE — TUBING CLEARLINK DUO-VENT - C-FLO (48EA/CA)

## (undated) DEVICE — Device

## (undated) DEVICE — CONTAINER SPECIMEN BAG OR - STERILE 4 OZ W/LID (100EA/CA)

## (undated) DEVICE — TRANSDUCER ADULT DISP. SINGLE BONDED STERILE - (20EA/CA)

## (undated) DEVICE — SUTURE 2-0 VICRYL PLUS CT-1 36 (36PK/BX)"

## (undated) DEVICE — SUCTION INSTRUMENT YANKAUER BULBOUS TIP W/O VENT (50EA/CA)

## (undated) DEVICE — PACK CYSTOSCOPY III - (8/CA)

## (undated) DEVICE — BLADE SURGICAL CLIPPER - (50EA/CA)

## (undated) DEVICE — SYRINGE 30 ML LL (56/BX)

## (undated) DEVICE — WATER IRRIG. STER. 1500 ML - (9/CA)

## (undated) DEVICE — GOWN SURGEONS X-LARGE - DISP. (30/CA)

## (undated) DEVICE — SET IRRIGATION CYSTOSCOPY Y-TYPE L81 IN (20EA/CA)

## (undated) DEVICE — SUTURE 3-0 SILK 12 X 18 IN - (36/BX)

## (undated) DEVICE — BAG URODRAIN WITH TUBING - (20/CA)

## (undated) DEVICE — NEEDLE THINWALL 19GA X 7CM

## (undated) DEVICE — BAG DRAINAGE URINARY CLOSED 2000ML (20EA/CA)

## (undated) DEVICE — SET LEADWIRE 5 LEAD BEDSIDE DISPOSABLE ECG (1SET OF 5/EA)

## (undated) DEVICE — VESSELOOP MAXI BLUE STERILE- SURG-I-LOOP (10EA/BX)

## (undated) DEVICE — SPONGE GAUZESTER 4 X 4 4PLY - (128PK/CA)

## (undated) DEVICE — SLEEVE, VASO, THIGH, MED

## (undated) DEVICE — BAG DECANTER (50EA/CS)

## (undated) DEVICE — MASK ANESTHESIA ADULT  - (100/CA)

## (undated) DEVICE — MEDICINE CUP STERILE 2 OZ - (100/CA)

## (undated) DEVICE — LEAD SET 6 DISP. EKG NIHON KOHDEN

## (undated) DEVICE — STAPLER SKIN DISP - (6/BX 10BX/CA) VISISTAT

## (undated) DEVICE — ELECTRODE ROLLERBALL 3MM 24FR (6EA/PK)

## (undated) DEVICE — HEAD HOLDER JUNIOR/ADULT

## (undated) DEVICE — GLOVE BIOGEL PI INDICATOR SZ 6.5 SURGICAL PF LF - (50/BX 4BX/CA)

## (undated) DEVICE — ELECTRODE MONOPOLAR ANGLED CUTTING LOOP DIAM 0.35 YELLOW 24FR (6EA/PK)

## (undated) DEVICE — SYRINGE SAFETY 10 ML 18 GA X 1 1/2 BLUNT LL (100/BX 4BX/CA)

## (undated) DEVICE — EVACUATOR BLADDER ELLIK - (10/BX)

## (undated) DEVICE — GOWN SURGICAL X-LARGE ULTRA - FILM-REINFORCED (20/CA)

## (undated) DEVICE — KIT SYRINGE SINGLE FOR MEDRAD MARK V + PROVIS (50/CA)

## (undated) DEVICE — GLOVE BIOGEL SZ 7.5 SURGICAL PF LTX - (50PR/BX 4BX/CA)

## (undated) DEVICE — MARKER SIZING OMNIFLUSH 5F 70 - 5/BX .035 20CM SEGMENT

## (undated) DEVICE — PROTECTOR ULNA NERVE - (36PR/CA)

## (undated) DEVICE — CANISTER SUCTION 3000ML MECHANICAL FILTER AUTO SHUTOFF MEDI-VAC NONSTERILE LF DISP  (40EA/CA)

## (undated) DEVICE — PACK MAJOR BASIN - (2EA/CA)

## (undated) DEVICE — SUTURE 4-0 MONOCRYL PLUS PS-2 - 27 INCH (36/BX)

## (undated) DEVICE — SUTURE 2-0 VICRYL PLUS SH - 8 X 18 INCH (12/BX)

## (undated) DEVICE — WIRE GUIDE LUNDQST.035X180 - TSMG-35-180-4-LES ORDER BY BOX (5EA/BX)

## (undated) DEVICE — SHEATH INTRODUCER PERFORMER 12F X 30CM

## (undated) DEVICE — KIT RADIAL ARTERY 20GA W/MAX BARRIER AND BIOPATCH  (5EA/CA) #10740 IS FOR THE SET RADIAL ARTERIAL

## (undated) DEVICE — GLOVE SZ 6 BIOGEL PI MICRO - PF LF (50PR/BX 4BX/CA)

## (undated) DEVICE — DRESSING TRANSPARENT FILM TEGADERM 4 X 4.75" (50EA/BX)"

## (undated) DEVICE — BALLOON RELIANT

## (undated) DEVICE — CONNECTOR HOSE NEPTUNE FOR CYSTO ROOM

## (undated) DEVICE — GLOVE SZ 7.5 BIOGEL PI MICRO - PF LF (50PR/BX)

## (undated) DEVICE — TUBE E-T HI-LO CUFF 7.5MM (10EA/PK)

## (undated) DEVICE — GOWN SURGEONS LARGE - (32/CA)

## (undated) DEVICE — CLIP SM INTNL HRZN TI ESCP LGT - (24EA/PK 25PK/BX)

## (undated) DEVICE — SUTURE CV

## (undated) DEVICE — GLOVE BIOGEL SZ 6 PF LATEX - (50EA/BX 4BX/CA)

## (undated) DEVICE — ELECTRODE DUAL RETURN W/ CORD - (50/PK)

## (undated) DEVICE — CLOSURE SKIN STRIP 1/2 X 4 IN - (STERI STRIP) (50/BX 4BX/CA)

## (undated) DEVICE — GLIDECATH ANGLE 4F X 70CM (5EA/BX)

## (undated) DEVICE — SUTURE 5-0 PROLENE BLUE C-1 HS 1 X 30 (36EA/BX)"

## (undated) DEVICE — SHEATH RO 6F 10CM (10EA/BX)

## (undated) DEVICE — PEN SKIN MARKER W/RULER - (50EA/BX)

## (undated) DEVICE — SYRINGE TOOMEY (50EA/CA)

## (undated) DEVICE — CLIP MED INTNL HRZN TI ESCP - (25/BX)

## (undated) DEVICE — SHEATH DRYSEAL FLEX INTRODUCER 18FR X 28CM

## (undated) DEVICE — COVER FOOT UNIVERSAL DISP. - (25EA/CA)